# Patient Record
Sex: MALE | Race: BLACK OR AFRICAN AMERICAN | NOT HISPANIC OR LATINO | Employment: OTHER | URBAN - NONMETROPOLITAN AREA
[De-identification: names, ages, dates, MRNs, and addresses within clinical notes are randomized per-mention and may not be internally consistent; named-entity substitution may affect disease eponyms.]

---

## 2017-01-09 ENCOUNTER — HOSPITAL ENCOUNTER (OUTPATIENT)
Dept: URGENT CARE | Facility: CLINIC | Age: 37
Discharge: HOME | End: 2017-01-09
Attending: FAMILY MEDICINE | Admitting: FAMILY MEDICINE

## 2017-01-11 ENCOUNTER — OFFICE VISIT (OUTPATIENT)
Dept: FAMILY MEDICINE CLINIC | Facility: CLINIC | Age: 37
End: 2017-01-11

## 2017-01-11 VITALS
SYSTOLIC BLOOD PRESSURE: 130 MMHG | HEIGHT: 70 IN | DIASTOLIC BLOOD PRESSURE: 80 MMHG | WEIGHT: 198.4 LBS | BODY MASS INDEX: 28.4 KG/M2

## 2017-01-11 DIAGNOSIS — IMO0001 ELEVATED BLOOD PRESSURE: ICD-10-CM

## 2017-01-11 DIAGNOSIS — F17.210 CIGARETTE SMOKER: Primary | ICD-10-CM

## 2017-01-11 DIAGNOSIS — G47.33 OSA (OBSTRUCTIVE SLEEP APNEA): ICD-10-CM

## 2017-01-11 DIAGNOSIS — Z23 NEED FOR VACCINATION: ICD-10-CM

## 2017-01-11 DIAGNOSIS — D86.3 CUTANEOUS SARCOIDOSIS: ICD-10-CM

## 2017-01-11 LAB
ALBUMIN SERPL-MCNC: 3.9 GM/DL (ref 3.4–4.8)
ALP SERPL-CCNC: 89 U/L (ref 38–126)
ALT SERPL-CCNC: 61 U/L (ref 21–72)
ANION GAP SERPL CALCULATED.3IONS-SCNC: 10 MMOL/L (ref 5–15)
AST SERPL-CCNC: 48 U/L (ref 17–59)
BILIRUB SERPL-MCNC: 0.9 MG/DL (ref 0.2–1.3)
BUN SERPL-MCNC: 16 MG/DL (ref 7–21)
CALCIUM SERPL-MCNC: 8.9 MG/DL (ref 8.4–10.2)
CHLORIDE SERPL-SCNC: 100 MMOL/L (ref 95–110)
CO2 SERPL-SCNC: 31 MMOL/L (ref 22–31)
CREAT SERPL-MCNC: 1.2 MG/DL (ref 0.7–1.3)
GLUCOSE SERPL-MCNC: 78 MG/DL (ref 60–100)
LDLC SERPL DIRECT ASSAY-MCNC: 98 MG/DL (ref 0–129)
MAGNESIUM SERPL-MCNC: 1.84 MG/DL (ref 1.6–2.3)
POTASSIUM SERPL-SCNC: 3.8 MMOL/L (ref 3.5–5.1)
PROT SERPL-MCNC: 7.5 GM/DL (ref 6.3–8.6)
SODIUM SERPL-SCNC: 141 MMOL/L (ref 137–145)

## 2017-01-11 PROCEDURE — 90471 IMMUNIZATION ADMIN: CPT | Performed by: FAMILY MEDICINE

## 2017-01-11 PROCEDURE — 90658 IIV3 VACCINE SPLT 0.5 ML IM: CPT | Performed by: FAMILY MEDICINE

## 2017-01-11 PROCEDURE — 99214 OFFICE O/P EST MOD 30 MIN: CPT | Performed by: FAMILY MEDICINE

## 2017-01-11 RX ORDER — CEFPROZIL 500 MG/1
500 TABLET, FILM COATED ORAL 2 TIMES DAILY
COMMUNITY
End: 2017-04-20

## 2017-01-11 RX ORDER — FLUTICASONE PROPIONATE 50 MCG
2 SPRAY, SUSPENSION (ML) NASAL DAILY
COMMUNITY
End: 2017-04-20

## 2017-01-11 NOTE — PROGRESS NOTES
Subjective   Nitin Jackson is a 36 y.o. male.     History of Present Illness requesting evaluation of hypertension.  States his had some elevated blood pressures off and on in the past.  Luigi Devine for 5 days ago with ear pain and blood pressures elevated that time seen in the care center few days ago found have an otitis media blood pressure 142/98.  Has a family history of hypertension but has never taken medication himself.  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  Does smoke.  Also uses a CPAP machine for sleep apnea.    The following portions of the patient's history were reviewed and updated as appropriate: allergies, current medications, past family history, past medical history, past social history, past surgical history and problem list.    Review of Systems   Constitutional: Negative for activity change, appetite change, fatigue and unexpected weight change.   HENT: Positive for ear pain and postnasal drip. Negative for trouble swallowing and voice change.    Eyes: Negative for redness and visual disturbance.   Respiratory: Negative for cough and wheezing.    Cardiovascular: Negative for chest pain and palpitations.   Gastrointestinal: Negative for abdominal pain, constipation, diarrhea, nausea and vomiting.   Genitourinary: Negative for urgency.   Musculoskeletal: Negative for joint swelling.   Neurological: Negative for syncope and headaches.   Hematological: Negative for adenopathy.   Psychiatric/Behavioral: Negative for sleep disturbance.       Objective   Physical Exam   Constitutional: He is oriented to person, place, and time. He appears well-developed.   HENT:   Head: Normocephalic.   Right Ear: External ear and ear canal normal. Tympanic membrane is erythematous and retracted.   Left Ear: Ear canal normal. Tympanic membrane is retracted.   Nose: Nose normal.   Mouth/Throat: Oropharynx is clear and moist.   Eyes: Pupils are equal, round, and reactive to light.    Neck: Normal range of motion. No thyromegaly present.   Cardiovascular: Normal rate, regular rhythm, normal heart sounds and intact distal pulses.  Exam reveals no gallop and no friction rub.    No murmur heard.  Pulmonary/Chest: Breath sounds normal.   Abdominal: Soft. He exhibits no distension and no mass. There is no tenderness.   Musculoskeletal: Normal range of motion.   Neurological: He is alert and oriented to person, place, and time. He has normal reflexes.   Skin: Skin is warm and dry.   Right forearm shows a small sclerotic whitish area consistent with  sarcoid no other lesions are seen.   Psychiatric: He has a normal mood and affect. His behavior is normal. Judgment and thought content normal. Cognition and memory are normal.       Assessment/Plan   Problems Addressed this Visit        Respiratory    SHE (obstructive sleep apnea)       Musculoskeletal and Integument    Cutaneous sarcoidosis    Relevant Orders    Comprehensive Metabolic Panel    MicroAlbumin, Urine, Random       Other    Cigarette smoker - Primary      Other Visit Diagnoses     Need for vaccination        Relevant Orders    Flu Vaccine Greater Than or Equal To 2yo With Preservative IM (Completed)    Elevated blood pressure        Relevant Orders    Comprehensive Metabolic Panel    Magnesium    MicroAlbumin, Urine, Random    LDL Cholesterol, Direct         on stopping smoking.  3-10m     Probably does have a genetic tendency towards hypertension.  Sarcoidosis confounded factors.  He is to finish up medications for his otitis counseled treatment of same.  Screening lab work done.  Has had abnormal thyroid test within the last year or so.   getting his CPAP machine readjusted.  Recheck again in 7-10 days after studies require a medication or not.

## 2017-01-11 NOTE — MR AVS SNAPSHOT
Nitin Jackson   1/11/2017 10:45 AM   Office Visit    Dept Phone:  [phone]   Encounter #:  [id number]    Provider:  Wild Boateng MD   Department:  [facility] FAMILY MEDICINE                Your Full Care Plan              Today's Medication Changes          These changes are accurate as of: 1/11/17 11:04 AM.  If you have any questions, ask your nurse or doctor.               Stop taking medication(s)listed here:     naproxen 500 MG tablet   Commonly known as:  NAPROSYN   Stopped by:  Wild Boateng MD                      Your Updated Medication List          This list is accurate as of: 1/11/17 11:04 AM.  Always use your most recent med list.                cefprozil 500 MG tablet   Commonly known as:  CEFZIL       fluticasone 50 MCG/ACT nasal spray   Commonly known as:  FLONASE               We Performed the Following     Comprehensive Metabolic Panel     Flu Vaccine Greater Than or Equal To 2yo With Preservative IM     LDL Cholesterol, Direct     Magnesium     MicroAlbumin, Urine, Random       You Were Diagnosed With        Codes Comments    Cigarette smoker    -  Primary ICD-10-CM: F17.210  ICD-9-CM: 305.1     Need for vaccination     ICD-10-CM: Z23  ICD-9-CM: V05.9     Elevated blood pressure     ICD-10-CM: I10  ICD-9-CM: 401.9     Cutaneous sarcoidosis     ICD-10-CM: D86.3  ICD-9-CM: 135     SHE (obstructive sleep apnea)     ICD-10-CM: G47.33  ICD-9-CM: 327.23       Instructions     None    Patient Instructions History      Upcoming Appointments     Visit Type Date Time Department    NEW PATIENT 1/11/2017 10:45 AM [facility] MED MAD 5TH    OFFICE VISIT 1/18/2017  9:30 AM [facility] FAM MED MAD 5TH      [facility] Sign[facility]     [facility] [employer] allows you to send messages to your doctor, view your test results, renew your prescriptions, schedule appointments, and more. To sign up, go to [employer] and click on the Sign Up Now link in the New User? box.  "Enter your [employer] Activation Code exactly as it appears below along with the last four digits of your Social Security Number and your Date of Birth () to complete the sign-up process. If you do not sign up before the expiration date, you must request a new code.    [employer] Activation Code: [email or web address]  Expires: 2017 11:04 AM    If you have questions, you can email [email or web address] or call [phone] to talk to our [employer] staff. Remember, [employer] is NOT to be used for urgent needs. For medical emergencies, dial 911.               Other Info from Your Visit           Your Appointments     2017  9:30 AM CST   Office Visit with Wild Boateng MD   [facility] FAMILY MEDICINE (--)    [address] [place] [facility] [address] [phone]           Arrive 15 minutes prior to appointment.              Allergies     No Known Allergies      Reason for Visit     Hypertension emilia from cc      Vital Signs     Blood Pressure Height Weight Body Mass Index Smoking Status       130/80 70\" (177.8 cm) 198 lb 6.4 oz (90 kg) 28.47 kg/m2 Current Every Day Smoker       Problems and Diagnoses Noted     Cigarette smoker    Cutaneous sarcoidosis    SHE (obstructive sleep apnea)    Need for vaccination        Elevated blood pressure          Immunizations Administered     Name Date    Influenza TIV (IM)         "

## 2017-01-18 ENCOUNTER — OFFICE VISIT (OUTPATIENT)
Dept: FAMILY MEDICINE CLINIC | Facility: CLINIC | Age: 37
End: 2017-01-18

## 2017-01-18 VITALS
BODY MASS INDEX: 27.63 KG/M2 | DIASTOLIC BLOOD PRESSURE: 80 MMHG | HEIGHT: 70 IN | SYSTOLIC BLOOD PRESSURE: 120 MMHG | WEIGHT: 193 LBS | TEMPERATURE: 96.8 F

## 2017-01-18 DIAGNOSIS — IMO0001 ELEVATED BLOOD PRESSURE: Primary | ICD-10-CM

## 2017-01-18 DIAGNOSIS — J06.9 ACUTE URI: ICD-10-CM

## 2017-01-18 DIAGNOSIS — J01.00 ACUTE NON-RECURRENT MAXILLARY SINUSITIS: ICD-10-CM

## 2017-01-18 DIAGNOSIS — F17.210 CIGARETTE SMOKER: ICD-10-CM

## 2017-01-18 PROCEDURE — 99214 OFFICE O/P EST MOD 30 MIN: CPT | Performed by: FAMILY MEDICINE

## 2017-01-18 RX ORDER — SOD CHLOR,SOD BICARB/NETI POT
PACKET, WITH RINSE DEVICE NASAL
Qty: 30 EACH | Refills: 11 | Status: SHIPPED | OUTPATIENT
Start: 2017-01-18 | End: 2017-04-20

## 2017-01-18 RX ORDER — PREDNISONE 20 MG/1
TABLET ORAL
Qty: 10 TABLET | Refills: 0 | Status: SHIPPED | OUTPATIENT
Start: 2017-01-18 | End: 2017-04-20

## 2017-01-18 RX ORDER — DOXYCYCLINE HYCLATE 100 MG/1
CAPSULE ORAL
Qty: 20 CAPSULE | Refills: 0 | Status: SHIPPED | OUTPATIENT
Start: 2017-01-18 | End: 2017-04-20

## 2017-01-18 NOTE — MR AVS SNAPSHOT
Nitin Jackson   1/18/2017 9:30 AM   Office Visit    Dept Phone:  [phone]   Encounter #:  [id number]    Provider:  Wild Boateng MD   Department:  [facility] FAMILY MEDICINE                Your Full Care Plan              Today's Medication Changes          These changes are accurate as of: 1/18/17  9:49 AM.  If you have any questions, ask your nurse or doctor.               New Medication(s)Ordered:     doxycycline 100 MG capsule   Commonly known as:  VIBRAMYCIN   1bic z10d   Started by:  Wild Boateng MD       NASAFLO NETI POT NASAL WASH pack   Bid prn   Started by:  Wild Boateng MD       predniSONE 20 MG tablet   Commonly known as:  DELTASONE   2qdx5   Started by:  Wild Boateng MD            Where to Get Your Medications      These medications were sent to [facility] [address] [place] [address] AT [facility] [phone] [facility] [phone]   [address] [address]     Phone:  [phone]     doxycycline 100 MG capsule    NASAFLO NETI POT NASAL WASH pack    predniSONE 20 MG tablet                  Your Updated Medication List          This list is accurate as of: 1/18/17  9:49 AM.  Always use your most recent med list.                cefprozil 500 MG tablet   Commonly known as:  CEFZIL       doxycycline 100 MG capsule   Commonly known as:  VIBRAMYCIN   1bic z10d       fluticasone 50 MCG/ACT nasal spray   Commonly known as:  FLONASE       NASAFLO NETI POT NASAL WASH pack   Bid prn       predniSONE 20 MG tablet   Commonly known as:  DELTASONE   2qdx5               You Were Diagnosed With        Codes Comments    Elevated blood pressure    -  Primary ICD-10-CM: I10  ICD-9-CM: 401.9     Acute URI     ICD-10-CM: J06.9  ICD-9-CM: 465.9     Cigarette smoker     ICD-10-CM: F17.210  ICD-9-CM: 305.1     Acute non-recurrent maxillary sinusitis     ICD-10-CM: J01.00  ICD-9-CM: 461.0       Instructions     None    "Patient Instructions History      Upcoming Appointments     Visit Type Date Time Department    [facility]    RE-EVALUATION 2017  9:15 AM [facility] MED [facility]AD 5[facility]      MyChart Signup     [facility] [employer] allows you to send messages to your doctor, view your test results, renew your prescriptions, schedule appointments, and more. To sign up, go to [employer] and click on the Sign Up Now link in the New User? box. Enter your [employer] Activation Code exactly as it appears below along with the last four digits of your Social Security Number and your Date of Birth () to complete the sign-up process. If you do not sign up before the expiration date, you must request a new code.    [employer] Activation Code: [email or web address]  Expires: 2017 11:04 AM    If you have questions, you can email [email or web address] or call [phone] to talk to our [email or web address] staff. Remember, [employer] is NOT to be used for urgent needs. For medical emergencies, dial 911.               Other Info from Your Visit           Your Appointments     May 18, 2017  9:15 AM CDT   REEVALUATION with Wild Boateng MD   [facility] (--)    [address] [place] [facility] [address] [phone]              Allergies     No Known Allergies      Reason for Visit     Follow-up 7-10 day reval      Vital Signs     Blood Pressure Temperature Height Weight Body Mass Index Smoking Status    120/80 96.8 °F (36 °C) 70\" (177.8 cm) 193 lb (87.5 kg) 27.69 kg/m2 Current Every Day Smoker      Problems and Diagnoses Noted     Cigarette smoker    Elevated blood pressure    Acute upper respiratory infection        Acute non-recurrent maxillary sinusitis            "

## 2017-01-18 NOTE — PROGRESS NOTES
Subjective   Nitin Jackson is a 36 y.o. male.     History of Present Illness reevaluation of borderline hypertension tobacco abuse.  Lab work was within normal limits.  Blood pressures now down.  Has lost about 5 pounds.  Has developed intercurrent URI with sinus pressure drainage cough congestion for over a week.    The following portions of the patient's history were reviewed and updated as appropriate: allergies, current medications, past family history, past medical history, past social history, past surgical history and problem list.    Review of Systems   Constitutional: Negative for activity change, appetite change, fatigue and unexpected weight change.   HENT: Positive for congestion, rhinorrhea and sinus pressure. Negative for trouble swallowing and voice change.    Eyes: Negative for redness and visual disturbance.   Respiratory: Positive for cough. Negative for wheezing.    Cardiovascular: Negative for chest pain and palpitations.   Gastrointestinal: Negative for abdominal pain, constipation, diarrhea, nausea and vomiting.   Genitourinary: Negative for urgency.   Musculoskeletal: Negative for joint swelling.   Neurological: Negative for syncope and headaches.   Hematological: Negative for adenopathy.   Psychiatric/Behavioral: Negative for sleep disturbance.       Objective   Physical Exam   Constitutional: He is oriented to person, place, and time. He appears well-developed.   HENT:   Head: Normocephalic.   Right Ear: External ear normal.   Left Ear: External ear normal.   Nose: Mucosal edema and rhinorrhea present. Right sinus exhibits maxillary sinus tenderness. Left sinus exhibits maxillary sinus tenderness.   Mouth/Throat: Posterior oropharyngeal edema and posterior oropharyngeal erythema present.   Eyes: Pupils are equal, round, and reactive to light.   Neck: Normal range of motion. No thyromegaly present.   Cardiovascular: Normal rate, regular rhythm, normal heart sounds and intact distal  pulses.  Exam reveals no gallop and no friction rub.    No murmur heard.  Pulmonary/Chest: He has rhonchi (scant bases) in the right lower field and the left lower field.   Abdominal: Soft. He exhibits no distension and no mass. There is no tenderness.   Musculoskeletal: Normal range of motion.   Neurological: He is alert and oriented to person, place, and time. He has normal reflexes.   Skin: Skin is warm and dry.   Psychiatric: He has a normal mood and affect.       Assessment/Plan   Problems Addressed this Visit        Cardiovascular and Mediastinum    Elevated blood pressure - Primary       Other    Cigarette smoker      Other Visit Diagnoses     Acute URI        Relevant Medications    doxycycline (VIBRAMYCIN) 100 MG capsule    Acute non-recurrent maxillary sinusitis        Relevant Medications    Hypertonic Nasal Wash (NASAFLO NETI POT NASAL WASH) pack    predniSONE (DELTASONE) 20 MG tablet    doxycycline (VIBRAMYCIN) 100 MG capsule        Romina pot's fluids Clain air handwashing symptomatic measures.   lifestyle measures recheck blood pressure 3 months

## 2017-04-20 ENCOUNTER — TRANSCRIBE ORDERS (OUTPATIENT)
Dept: GENERAL RADIOLOGY | Facility: CLINIC | Age: 37
End: 2017-04-20

## 2017-04-20 ENCOUNTER — TRANSCRIBE ORDERS (OUTPATIENT)
Dept: LAB | Facility: HOSPITAL | Age: 37
End: 2017-04-20

## 2017-04-20 ENCOUNTER — LAB (OUTPATIENT)
Dept: LAB | Facility: HOSPITAL | Age: 37
End: 2017-04-20

## 2017-04-20 ENCOUNTER — OFFICE VISIT (OUTPATIENT)
Dept: FAMILY MEDICINE CLINIC | Facility: CLINIC | Age: 37
End: 2017-04-20

## 2017-04-20 VITALS
DIASTOLIC BLOOD PRESSURE: 98 MMHG | HEIGHT: 70 IN | WEIGHT: 197.9 LBS | BODY MASS INDEX: 28.33 KG/M2 | SYSTOLIC BLOOD PRESSURE: 148 MMHG | TEMPERATURE: 98 F

## 2017-04-20 DIAGNOSIS — F17.210 CIGARETTE SMOKER: ICD-10-CM

## 2017-04-20 DIAGNOSIS — Z79.899 LONG-TERM USE OF HIGH-RISK MEDICATION: ICD-10-CM

## 2017-04-20 DIAGNOSIS — Z72.0 TOBACCO USE: ICD-10-CM

## 2017-04-20 DIAGNOSIS — I10 ESSENTIAL HYPERTENSION, BENIGN: ICD-10-CM

## 2017-04-20 DIAGNOSIS — Z79.899 LONG-TERM USE OF HIGH-RISK MEDICATION: Primary | ICD-10-CM

## 2017-04-20 DIAGNOSIS — D86.3 CUTANEOUS SARCOIDOSIS: ICD-10-CM

## 2017-04-20 DIAGNOSIS — D86.9 SARCOIDOSIS: Primary | ICD-10-CM

## 2017-04-20 DIAGNOSIS — J40 BRONCHITIS: ICD-10-CM

## 2017-04-20 DIAGNOSIS — J06.9 ACUTE URI: Primary | ICD-10-CM

## 2017-04-20 DIAGNOSIS — Z23 NEED FOR PNEUMOCOCCAL VACCINE: ICD-10-CM

## 2017-04-20 DIAGNOSIS — R05.9 COUGH: ICD-10-CM

## 2017-04-20 PROBLEM — IMO0001 ELEVATED BLOOD PRESSURE: Status: RESOLVED | Noted: 2017-01-18 | Resolved: 2017-04-20

## 2017-04-20 LAB
ALBUMIN SERPL-MCNC: 4.5 G/DL (ref 3.4–4.8)
ALP SERPL-CCNC: 91 U/L (ref 38–126)
ALT SERPL W P-5'-P-CCNC: 30 U/L (ref 21–72)
AST SERPL-CCNC: 43 U/L (ref 17–59)
BASOPHILS # BLD AUTO: 0.01 10*3/MM3 (ref 0–0.2)
BASOPHILS NFR BLD AUTO: 0.1 % (ref 0–2)
BILIRUB CONJ SERPL-MCNC: 0 MG/DL (ref 0–0.3)
BILIRUB INDIRECT SERPL-MCNC: 0.2 MG/DL (ref 0–1.1)
BILIRUB SERPL-MCNC: 0.6 MG/DL (ref 0.2–1.3)
DEPRECATED RDW RBC AUTO: 42.7 FL (ref 35.1–43.9)
EOSINOPHIL # BLD AUTO: 0.11 10*3/MM3 (ref 0–0.7)
EOSINOPHIL NFR BLD AUTO: 0.7 % (ref 0–7)
ERYTHROCYTE [DISTWIDTH] IN BLOOD BY AUTOMATED COUNT: 13.2 % (ref 11.5–14.5)
ERYTHROCYTE [SEDIMENTATION RATE] IN BLOOD: 4 MM/HR (ref 0–15)
HCT VFR BLD AUTO: 44.7 % (ref 39–49)
HGB BLD-MCNC: 15.7 G/DL (ref 13.7–17.3)
IMM GRANULOCYTES # BLD: 0.06 10*3/MM3 (ref 0–0.02)
IMM GRANULOCYTES NFR BLD: 0.4 % (ref 0–0.5)
LYMPHOCYTES # BLD AUTO: 2.67 10*3/MM3 (ref 0.6–4.2)
LYMPHOCYTES NFR BLD AUTO: 18.2 % (ref 10–50)
MCH RBC QN AUTO: 31.2 PG (ref 26.5–34)
MCHC RBC AUTO-ENTMCNC: 35.1 G/DL (ref 31.5–36.3)
MCV RBC AUTO: 88.7 FL (ref 80–98)
MONOCYTES # BLD AUTO: 1.89 10*3/MM3 (ref 0–0.9)
MONOCYTES NFR BLD AUTO: 12.8 % (ref 0–12)
NEUTROPHILS # BLD AUTO: 9.97 10*3/MM3 (ref 2–8.6)
NEUTROPHILS NFR BLD AUTO: 67.8 % (ref 37–80)
PLATELET # BLD AUTO: 196 10*3/MM3 (ref 150–450)
PMV BLD AUTO: 12.1 FL (ref 8–12)
PROT SERPL-MCNC: 8 G/DL (ref 6.3–8.6)
RBC # BLD AUTO: 5.04 10*6/MM3 (ref 4.37–5.74)
WBC NRBC COR # BLD: 14.71 10*3/MM3 (ref 3.2–9.8)

## 2017-04-20 PROCEDURE — 80076 HEPATIC FUNCTION PANEL: CPT

## 2017-04-20 PROCEDURE — 85025 COMPLETE CBC W/AUTO DIFF WBC: CPT

## 2017-04-20 PROCEDURE — 82164 ANGIOTENSIN I ENZYME TEST: CPT

## 2017-04-20 PROCEDURE — 85651 RBC SED RATE NONAUTOMATED: CPT

## 2017-04-20 PROCEDURE — 99214 OFFICE O/P EST MOD 30 MIN: CPT | Performed by: FAMILY MEDICINE

## 2017-04-20 PROCEDURE — 86038 ANTINUCLEAR ANTIBODIES: CPT

## 2017-04-20 PROCEDURE — 36415 COLL VENOUS BLD VENIPUNCTURE: CPT

## 2017-04-20 RX ORDER — CHLORTHALIDONE 25 MG/1
25 TABLET ORAL DAILY
Qty: 30 TABLET | Refills: 1 | Status: ON HOLD | OUTPATIENT
Start: 2017-04-20 | End: 2017-08-22

## 2017-04-20 RX ORDER — BENZONATATE 200 MG/1
200 CAPSULE ORAL 3 TIMES DAILY PRN
Qty: 30 CAPSULE | Refills: 0 | Status: SHIPPED | OUTPATIENT
Start: 2017-04-20 | End: 2017-04-26

## 2017-04-20 RX ORDER — DOXYCYCLINE HYCLATE 100 MG/1
CAPSULE ORAL
Qty: 14 CAPSULE | Refills: 0 | Status: SHIPPED | OUTPATIENT
Start: 2017-04-20 | End: 2017-04-25

## 2017-04-20 RX ORDER — ALBUTEROL SULFATE 90 UG/1
2 AEROSOL, METERED RESPIRATORY (INHALATION) EVERY 4 HOURS PRN
Qty: 1 INHALER | Refills: 2 | Status: SHIPPED | OUTPATIENT
Start: 2017-04-20 | End: 2019-06-03 | Stop reason: SDUPTHER

## 2017-04-20 NOTE — PROGRESS NOTES
Subjective   Nitin Jackson is a 36 y.o. male.     History of Present Illness requesting evaluation cough congestion coryza wheeze blood pressure.  Continues to smoke.  Develop cough and congestion last 1-3 days.  No fever.  Has being seen by dermatology being worked up for sarcoid.  Currently is getting lab work and chest x-ray ordered today.  Is also to start plaquinel  and oral steroids today as well.  Blood pressure  creeping up over the last several weeks and has a history of borderline blood pressure in the past.     The following portions of the patient's history were reviewed and updated as appropriate: allergies, current medications, past family history, past medical history, past social history, past surgical history and problem list.    Review of Systems   Constitutional: Negative for activity change, appetite change, fatigue and unexpected weight change.   HENT: Positive for congestion. Negative for trouble swallowing and voice change.    Eyes: Negative for redness and visual disturbance.   Respiratory: Positive for cough. Negative for wheezing.    Cardiovascular: Negative for chest pain and palpitations.   Gastrointestinal: Negative for abdominal pain, constipation, diarrhea, nausea and vomiting.   Genitourinary: Negative for urgency.   Musculoskeletal: Negative for joint swelling.   Neurological: Negative for syncope and headaches.   Hematological: Negative for adenopathy.   Psychiatric/Behavioral: Negative for sleep disturbance.       Objective   Physical Exam   Constitutional: He is oriented to person, place, and time. He appears well-developed.   HENT:   Head: Normocephalic.   Right Ear: External ear normal.   Left Ear: External ear normal.   Nose: Mucosal edema present.   Mouth/Throat: Oropharynx is clear and moist.   Eyes: Pupils are equal, round, and reactive to light.   Neck: Normal range of motion. No thyromegaly present.   Cardiovascular: Normal rate, regular rhythm, normal heart sounds  and intact distal pulses.  Exam reveals no gallop and no friction rub.    No murmur heard.  Pulmonary/Chest: He has rhonchi in the right lower field and the left lower field.   Normal rate   Abdominal: Soft. He exhibits no distension and no mass. There is no tenderness.   Musculoskeletal: Normal range of motion.   Neurological: He is alert and oriented to person, place, and time. He has normal reflexes.   Skin: Skin is warm and dry.   Forearm lesions as before   Psychiatric: He has a normal mood and affect.       Assessment/Plan   Problems Addressed this Visit        Cardiovascular and Mediastinum    Essential hypertension, benign    Relevant Medications    chlorthalidone (HYGROTON) 25 MG tablet       Musculoskeletal and Integument    Cutaneous sarcoidosis       Other    Cigarette smoker    Relevant Medications    doxycycline (VIBRAMYCIN) 100 MG capsule      Other Visit Diagnoses     Acute URI    -  Primary    Relevant Medications    albuterol (PROVENTIL HFA;VENTOLIN HFA) 108 (90 BASE) MCG/ACT inhaler    doxycycline (VIBRAMYCIN) 100 MG capsule    Bronchitis        Relevant Medications    albuterol (PROVENTIL HFA;VENTOLIN HFA) 108 (90 BASE) MCG/ACT inhaler    benzonatate (TESSALON) 200 MG capsule    Cough        Relevant Medications    albuterol (PROVENTIL HFA;VENTOLIN HFA) 108 (90 BASE) MCG/ACT inhaler    benzonatate (TESSALON) 200 MG capsule    doxycycline (VIBRAMYCIN) 100 MG capsule          Measures for bronchitis and wheezing.  Counseled again on stopping smoking is previous appointment.  Good*chlorthalidone for blood pressure since is going to be on steroids as well.   lifestyle measures as before.  Recheck 4 weeks.

## 2017-04-21 LAB
ACE SERPL-CCNC: 73 U/L (ref 14–82)
ANA SER QL IA: NEGATIVE

## 2017-04-25 ENCOUNTER — OFFICE VISIT (OUTPATIENT)
Dept: FAMILY MEDICINE CLINIC | Facility: CLINIC | Age: 37
End: 2017-04-25

## 2017-04-25 VITALS
BODY MASS INDEX: 28.07 KG/M2 | HEIGHT: 70 IN | TEMPERATURE: 98.7 F | OXYGEN SATURATION: 98 % | WEIGHT: 196.1 LBS | DIASTOLIC BLOOD PRESSURE: 94 MMHG | SYSTOLIC BLOOD PRESSURE: 158 MMHG | HEART RATE: 85 BPM

## 2017-04-25 DIAGNOSIS — I10 ESSENTIAL HYPERTENSION, BENIGN: Primary | ICD-10-CM

## 2017-04-25 DIAGNOSIS — R93.89 ABNORMAL CHEST X-RAY: ICD-10-CM

## 2017-04-25 PROCEDURE — 99214 OFFICE O/P EST MOD 30 MIN: CPT | Performed by: FAMILY MEDICINE

## 2017-04-25 RX ORDER — AMLODIPINE BESYLATE 5 MG/1
5 TABLET ORAL DAILY
Qty: 90 TABLET | Refills: 3 | Status: ON HOLD | OUTPATIENT
Start: 2017-04-25 | End: 2017-08-22

## 2017-04-25 NOTE — PROGRESS NOTES
Subjective   Nitin Jackson is a 36 y.o. male.     History of Present Illness reevaluation of blood pressure and lung issues.  In the interim blood pressures begin to creep upward even on chlorthalidone.  Lab work drawn by dermatology revealed a normal screen for sarcoid.  However chest x-ray showed a sequential enlarging nodule right upper lobe of the lung.  Given his history needs a pulmonary evaluation of same.  Smoking in is having still some residual congestion but overall improved.    The following portions of the patient's history were reviewed and updated as appropriate: allergies, current medications, past family history, past medical history, past social history, past surgical history and problem list.    Review of Systems   Constitutional: Negative for activity change, appetite change, fatigue and unexpected weight change.   HENT: Positive for congestion. Negative for trouble swallowing and voice change.    Eyes: Negative for redness and visual disturbance.   Respiratory: Positive for cough. Negative for wheezing.    Cardiovascular: Negative for chest pain and palpitations.   Gastrointestinal: Negative for abdominal pain, constipation, diarrhea, nausea and vomiting.   Genitourinary: Negative for urgency.   Musculoskeletal: Negative for joint swelling.   Neurological: Negative for syncope and headaches.   Hematological: Negative for adenopathy.   Psychiatric/Behavioral: Negative for sleep disturbance.       Objective   Physical Exam   HENT:   Head: Normocephalic.   Eyes: Pupils are equal, round, and reactive to light.   Neck: Normal range of motion.   Cardiovascular: Normal rate, regular rhythm and normal heart sounds.    Pulmonary/Chest: Effort normal and breath sounds normal. He has no wheezes.   Abdominal: Soft. Bowel sounds are normal.   Musculoskeletal: Normal range of motion.   Neurological: He is alert. He has normal reflexes.   Skin: Skin is warm.   Psychiatric: He has a normal mood and affect.        Assessment/Plan   Problems Addressed this Visit        Cardiovascular and Mediastinum    Essential hypertension, benign - Primary    Relevant Medications    amLODIPine (NORVASC) 5 MG tablet      Other Visit Diagnoses     Abnormal chest x-ray        Relevant Orders    Ambulatory Referral to Pulmonology          Add Norvasc to chlorthalidone in  lifestyle measures  staying off cigarettes.  Consultation with pulmonology in regards to x-ray and questionable history of cutaneous sarcoid.  Recheck on blood pressure 3 months

## 2017-04-26 ENCOUNTER — OFFICE VISIT (OUTPATIENT)
Dept: PULMONOLOGY | Facility: CLINIC | Age: 37
End: 2017-04-26

## 2017-04-26 VITALS
DIASTOLIC BLOOD PRESSURE: 88 MMHG | SYSTOLIC BLOOD PRESSURE: 150 MMHG | OXYGEN SATURATION: 98 % | HEIGHT: 70 IN | BODY MASS INDEX: 28.06 KG/M2 | HEART RATE: 82 BPM | WEIGHT: 196 LBS

## 2017-04-26 DIAGNOSIS — R93.89 ABNORMAL CXR: Primary | ICD-10-CM

## 2017-04-26 DIAGNOSIS — G47.33 OSA (OBSTRUCTIVE SLEEP APNEA): ICD-10-CM

## 2017-04-26 DIAGNOSIS — J30.9 ALLERGIC RHINITIS, UNSPECIFIED ALLERGIC RHINITIS TRIGGER, UNSPECIFIED RHINITIS SEASONALITY: ICD-10-CM

## 2017-04-26 DIAGNOSIS — D86.3 CUTANEOUS SARCOIDOSIS: ICD-10-CM

## 2017-04-26 DIAGNOSIS — R05.9 COUGH: ICD-10-CM

## 2017-04-26 DIAGNOSIS — F17.210 CIGARETTE SMOKER: ICD-10-CM

## 2017-04-26 PROCEDURE — 99214 OFFICE O/P EST MOD 30 MIN: CPT | Performed by: INTERNAL MEDICINE

## 2017-04-26 PROCEDURE — 94729 DIFFUSING CAPACITY: CPT | Performed by: INTERNAL MEDICINE

## 2017-04-26 PROCEDURE — 94060 EVALUATION OF WHEEZING: CPT | Performed by: INTERNAL MEDICINE

## 2017-04-26 PROCEDURE — 94727 GAS DIL/WSHOT DETER LNG VOL: CPT | Performed by: INTERNAL MEDICINE

## 2017-04-26 RX ORDER — FLUTICASONE PROPIONATE 50 MCG
2 SPRAY, SUSPENSION (ML) NASAL DAILY
Qty: 1 EACH | Refills: 11
Start: 2017-04-26 | End: 2021-03-19 | Stop reason: SDUPTHER

## 2017-04-26 NOTE — PROGRESS NOTES
Pulmonary Consultation    Subjective     Chief Complaint   Patient presents with   • Abnormal Chest X-ray        History of Present Illness  Nitin Jackson is a 36 y.o. male with a PMH significant for tobacco use, cutaneous sarcoid, SHE CPAP, and hypertension who presents for evaluation of an abnormal chest x-ray.  Patient was previously seen in consultation by Dr. Gandhi in April of last year for abnormal CT and underwent bronchoscopy which was only significant for findings suspicious for asthma. Pt states he has had issues with possible sarcoidosis for several years. He has noticed 4-5yrs of KYLE and fatigue. Pt admits to chronic nasal congestion and drainage, but he had never been diagnosed with allergies. He admits to wheeze and cough productive of yellowish sputum. Pt previously smoked 1ppd from 16 until last week. He has not noticed much difference since stopping. Pt was just prescribed albuterol but he has not been able to use it yet. He underwent a skin biopsy last week due to persistent rash but he is awaiting results. There is no lung disease in his family. He currently works as a alvarez.     Review of Systems: History obtained from chart review and the patient.  Review of Systems   HENT: Positive for congestion and postnasal drip.    Respiratory: Positive for cough, shortness of breath and wheezing.    Gastrointestinal:        Heartburn   Skin: Positive for rash.     As described in the HPI. Otherwise, remainder of ROS (14 systems) were negative.    Patient Active Problem List   Diagnosis   • Cigarette smoker   • Cutaneous sarcoidosis   • SHE (obstructive sleep apnea)   • Essential hypertension, benign   • Allergic rhinitis         Current Outpatient Prescriptions:   •  albuterol (PROVENTIL HFA;VENTOLIN HFA) 108 (90 BASE) MCG/ACT inhaler, Inhale 2 puffs Every 4 (Four) Hours As Needed for Wheezing., Disp: 1 inhaler, Rfl: 2  •  amLODIPine (NORVASC) 5 MG tablet, Take 1 tablet by mouth Daily. For bp along  "with other, Disp: 90 tablet, Rfl: 3  •  chlorthalidone (HYGROTON) 25 MG tablet, Take 1 tablet by mouth Daily., Disp: 30 tablet, Rfl: 1  •  fluticasone (FLONASE) 50 MCG/ACT nasal spray, 2 sprays into each nostril Daily for 30 days., Disp: 1 each, Rfl: 11    Past Medical History:   Diagnosis Date   • Allergic rhinitis    • Chronic dermatitis    • Chronic right shoulder pain    • Cigarette smoker 8/24/2016   • Cutaneous sarcoidosis 8/24/2016   • DJD (degenerative joint disease)     shoulder region, right side   • Dyspnea on exertion    • Elevated blood pressure    • Encounter for routine adult health examination    • Fatigue    • Obstructive sleep apnea    • Obstructive sleep apnea syndrome 8/24/2016   • Pain in left knee    • Vertigo      Past Surgical History:   Procedure Laterality Date   • INJECTION OF MEDICATION  08/10/2016    Kenalog   • SHOULDER ARTHROSCOPY  01/13/2015    Arthroscopy of shoulder, subacromial decompression, Reed, and injection of the shoulder with 80 ml Kenalog     Social History     Social History   • Marital status: Single     Spouse name: N/A   • Number of children: N/A   • Years of education: N/A     Social History Main Topics   • Smoking status: Former Smoker   • Smokeless tobacco: None   • Alcohol use No   • Drug use: None   • Sexual activity: Not Asked     Other Topics Concern   • None     Social History Narrative     Family History   Problem Relation Age of Onset   • Diabetes Other    • Heart disease Other    • Hypertension Other           Objective     Blood pressure 150/88, pulse 82, height 70\" (177.8 cm), weight 196 lb (88.9 kg), SpO2 98 %.  Physical Exam   Constitutional: He is oriented to person, place, and time. Vital signs are normal. He appears well-developed and well-nourished.   HENT:   Head: Normocephalic and atraumatic.   Nose: Mucosal edema present.   Mouth/Throat: Uvula is midline, oropharynx is clear and moist and mucous membranes are normal.   Mallampati 4   Eyes: " Conjunctivae, EOM and lids are normal.   Neck: Trachea normal and normal range of motion. No tracheal tenderness present. No thyroid mass present.   Cardiovascular: Normal rate, regular rhythm and normal heart sounds.  Exam reveals no gallop.    No murmur heard.  Pulmonary/Chest: Effort normal. No respiratory distress. He has wheezes (on forced expiration). He has no rhonchi. Chest wall is not dull to percussion. He exhibits no tenderness.   Abdominal: Soft. Normal appearance and bowel sounds are normal. There is no tenderness.       Vascular Status -  His exam exhibits no right foot edema. His exam exhibits no left foot edema.  Lymphadenopathy:        Head (right side): No submandibular adenopathy present.        Head (left side): No submandibular adenopathy present.     He has no cervical adenopathy.        Right: No supraclavicular adenopathy present.        Left: No supraclavicular adenopathy present.   Neurological: He is alert and oriented to person, place, and time.   Skin: Skin is warm and dry. Rash (discoid rash right arm) noted. No cyanosis. Nails show no clubbing.   Psychiatric: He has a normal mood and affect. His speech is normal and behavior is normal. Judgment normal.   Nursing note and vitals reviewed.      PFTs: 4/26/17  Ratio 100  FVC 2.7/61%  FEV1 2.7/74%  TLC 6.26/90%  RV/%  DLCO 18.29/56%  Normal spirometry and volumes.  No significant bronchodilator response.  Air trapping.  Moderately reduced diffusing capacity.  No comparative data available.     Radiology (independently reviewed and interpreted by me): CXR 4/20/17 showed increased size of right upper lobe nodule.    CT chest with contrast 3/31/16: Left lung apical nodular opacity is irregular borders measuring 6.7 mm, left upper lobe apical posterior segments opacities measuring up to 9.6 mm, additional left upper lobe apical posterior segment nodular opacity measuring 8.6 mm, left lower lobes.  Segment patchy opacities.  A borders  measuring 3.4 x 2 x 1.1 cm, right upper lobe apical segment patchy opacity measuring 1.2 cm, additional right upper lobe posterior segment irregular patchy opacity measuring 2.3 x 1.8 x 1.4 cm with finger in glove appearance.         Assessment/Plan     Nitin was seen today for abnormal chest x-ray.    Diagnoses and all orders for this visit:    Abnormal CXR  -     CT Chest Hi Resolution; Future    Cough    Cigarette smoker    SHE (obstructive sleep apnea)    Allergic rhinitis, unspecified allergic rhinitis trigger, unspecified rhinitis seasonality  -     fluticasone (FLONASE) 50 MCG/ACT nasal spray; 2 sprays into each nostril Daily for 30 days.    Cutaneous sarcoidosis         Discussion/ Recommendations:   Finding on chest x-ray is likely correlates with patchy opacities seen on CT scan last year.  He has undergone bronchoscopy which was nondiagnostic, but carries a diagnosis of likely cutaneous sarcoidosis.  Is very possible these areas represent inflammation related to sarcoidosis, although it is unusual pattern knee has no adenopathy.  I think his congestion and shortness of breath are more likely related to chronic allergic rhinitis and possibly underlying asthma.  PFTs did not show concerning deficits that would be consistent with sarcoidosis.    -CT chest without contrast to more fully evaluate abnormality's on chest x-ray.  Requested that he return following his CT to review the results and discuss possible further workup.  -Start generic Flonase.  If symptoms persist after 2 weeks, would recommend starting generic Zyrtec daily.  -Encourage patient to get albuterol filled and use as needed.  We'll assess if he needs a daily bronchodilator at follow-up.  -Follow-up with dermatologist regarding recent skin biopsy.  -Pt needs to continue smoking cessation.       Return in about 2 weeks (around 5/10/2017) for Recheck to review CT chest.      Thank you for allowing me to participate in the care of Nitin Arnold  Manuel. Please do not hesitate to contact me with any questions.         This document has been electronically signed by Licha Molina MD on April 26, 2017 11:36 AM      EMR Dragon/Transcription disclaimer:     Much of this encounter note is an electronic transcription/translation of spoken language to printed text. The electronic translation of spoken language may permit erroneous, or at times, nonsensical words or phrases to be inadvertently transcribed; Although I have reviewed the note for such errors, some may still exist.

## 2017-05-02 ENCOUNTER — HOSPITAL ENCOUNTER (OUTPATIENT)
Dept: CT IMAGING | Facility: HOSPITAL | Age: 37
Discharge: HOME | End: 2017-05-02
Admitting: INTERNAL MEDICINE

## 2017-05-02 DIAGNOSIS — R93.89 ABNORMAL CXR: ICD-10-CM

## 2017-05-02 PROCEDURE — 71250 CT THORAX DX C-: CPT

## 2017-05-10 ENCOUNTER — OFFICE VISIT (OUTPATIENT)
Dept: PULMONOLOGY | Facility: CLINIC | Age: 37
End: 2017-05-10

## 2017-05-10 VITALS
OXYGEN SATURATION: 98 % | SYSTOLIC BLOOD PRESSURE: 129 MMHG | DIASTOLIC BLOOD PRESSURE: 80 MMHG | WEIGHT: 198 LBS | HEIGHT: 70 IN | HEART RATE: 89 BPM | BODY MASS INDEX: 28.35 KG/M2

## 2017-05-10 DIAGNOSIS — R93.89 ABNORMAL CT OF THE CHEST: Primary | ICD-10-CM

## 2017-05-10 DIAGNOSIS — D86.9 SARCOIDOSIS: ICD-10-CM

## 2017-05-10 DIAGNOSIS — J45.30 MILD PERSISTENT ASTHMA WITHOUT COMPLICATION: ICD-10-CM

## 2017-05-10 DIAGNOSIS — J30.2 SEASONAL ALLERGIC RHINITIS, UNSPECIFIED ALLERGIC RHINITIS TRIGGER: ICD-10-CM

## 2017-05-10 DIAGNOSIS — F17.200 TOBACCO USE DISORDER: ICD-10-CM

## 2017-05-10 PROCEDURE — 99214 OFFICE O/P EST MOD 30 MIN: CPT | Performed by: INTERNAL MEDICINE

## 2017-06-26 ENCOUNTER — OFFICE VISIT (OUTPATIENT)
Dept: PULMONOLOGY | Facility: CLINIC | Age: 37
End: 2017-06-26

## 2017-06-26 VITALS
HEIGHT: 70 IN | DIASTOLIC BLOOD PRESSURE: 90 MMHG | BODY MASS INDEX: 28.2 KG/M2 | HEART RATE: 77 BPM | WEIGHT: 197 LBS | OXYGEN SATURATION: 99 % | SYSTOLIC BLOOD PRESSURE: 122 MMHG

## 2017-06-26 DIAGNOSIS — D86.3 CUTANEOUS SARCOIDOSIS: ICD-10-CM

## 2017-06-26 DIAGNOSIS — F17.210 CIGARETTE SMOKER: ICD-10-CM

## 2017-06-26 DIAGNOSIS — R93.89 ABNORMAL CT OF THE CHEST: ICD-10-CM

## 2017-06-26 DIAGNOSIS — J45.30 MILD PERSISTENT ASTHMA WITHOUT COMPLICATION: Primary | ICD-10-CM

## 2017-06-26 PROCEDURE — 99214 OFFICE O/P EST MOD 30 MIN: CPT | Performed by: INTERNAL MEDICINE

## 2017-06-26 NOTE — PROGRESS NOTES
Pulmonary Office Follow-up    Subjective     Nitin Jackson is seen today at the office for   Chief Complaint   Patient presents with   • Follow-up     1 month         HPI  Nitin Jackson is a 36 y.o. male with a PMH significant for tobacco use, cutaneous sarcoid, SHE CPAP, and hypertension who presents for follow up Of asthma.  He was last seen on 5/10/17, at which time I recommended starting Qvar as well as waiting until he reactivate his insurance to proceed with lung biopsy. He does feel the Qvar is helping and he is not using his albuterol. Patient was able to get his insurance reinstated so he is ready to move forward with biopsy.  He's been able to cut down his smoking and is smoking 5 cigarettes daily.    Patient Active Problem List   Diagnosis   • Cigarette smoker   • Cutaneous sarcoidosis   • SHE (obstructive sleep apnea)   • Essential hypertension, benign   • Allergic rhinitis   • Sarcoidosis       Review of Systems  Review of Systems   Respiratory: Positive for shortness of breath.    Skin: Positive for rash.     As described in the HPI. Otherwise, remainder of ROS (14 systems) were negative.    Medications, Allergies, Social, and Family Histories reviewed as per EMR.    Objective     Vitals:    06/26/17 1129   BP: 122/90   Pulse: 77   SpO2: 99%     Physical Exam   Constitutional: He is oriented to person, place, and time. Vital signs are normal. He appears well-developed and well-nourished.   HENT:   Head: Normocephalic and atraumatic.   Nose: No mucosal edema.   Mouth/Throat: Uvula is midline, oropharynx is clear and moist and mucous membranes are normal.   Mallampati 4   Eyes: Conjunctivae, EOM and lids are normal.   Neck: Trachea normal and normal range of motion. No tracheal tenderness present. No thyroid mass present.   Cardiovascular: Normal rate, regular rhythm and normal heart sounds.  Exam reveals no gallop.    No murmur heard.  Pulmonary/Chest: Effort normal. No respiratory distress.  He has no wheezes. He has no rhonchi.   Abdominal: Soft. Normal appearance and bowel sounds are normal. There is no tenderness.       Vascular Status -  His exam exhibits no right foot edema. His exam exhibits no left foot edema.  Lymphadenopathy:        Head (right side): No submandibular adenopathy present.        Head (left side): No submandibular adenopathy present.     He has no cervical adenopathy.        Right: No supraclavicular adenopathy present.        Left: No supraclavicular adenopathy present.   Neurological: He is alert and oriented to person, place, and time.   Skin: Skin is warm and dry. Rash (annular rash right forearm and thigh) noted. No cyanosis. Nails show no clubbing.   Psychiatric: He has a normal mood and affect. His speech is normal and behavior is normal. Judgment normal.   Nursing note and vitals reviewed.      IMAGING: HRCT 5/2/17 (independently reviewed and interpreted by me) showed no lymphadenopathy, mildly enlarged left apical interstitial nodule injuring 1 cm, stable left apex interstitial nodule measuring 8 mm, minimal enlargement of the left upper lobe apical small nodular interstitial opacities largest measuring 1.3 cm, new right upper lobe apical interstitial nodule 6.6 mm, mild decrease in size of right upper lobe apical speculated nodule opacity 1.5 cm, minimal enlargement left upper lobe apical posterior interstitial nodular opacities measuring 9 mm, white blood posterior segment peripheral stable patchy opacity 2.2 cm, left upper lobes.  Segment patchy interstitial opacities speedily borders not appreciated changed measuring 2.5 x 2.2 x 1.9 cm.      Assessment/Plan     Nitin was seen today for follow-up.    Diagnoses and all orders for this visit:    Mild persistent asthma without complication    Abnormal CT of the chest  -     Ambulatory Referral to Pulmonology    Cutaneous sarcoidosis    Cigarette smoker         Discussion/ Recommendations:   He is doing better  symptomatically on the Qvar so I think we'll stay the course.  Discussed options for biopsy including navigation bronchoscopy versus CT-guided biopsy.  I do think navigation bronchoscopy would offer is more options with regards to sampling both sides as well as lower risk for pneumothorax.  I do think his infiltrates are most consistent with sarcoidosis, but I cannot rule out other etiologies without further workup.    -Continue Qvar 40.   -Continue using albuterol as needed.  -Referral to [facility] for consideration of navigation bronchoscopy.  Instructed him to obtain his HRCT on a disc to take with him to his appointment in [place].  -Again, stressed the importance of continued tobacco cessation.      Return in about 4 weeks (around 7/24/2017) for Recheck.          This document has been electronically signed by Licha Molina MD on June 26, 2017 12:04 PM      Dictated using Dragon

## 2017-07-24 ENCOUNTER — TELEPHONE (OUTPATIENT)
Dept: ENDOCRINOLOGY | Facility: CLINIC | Age: 37
End: 2017-07-24

## 2017-08-10 ENCOUNTER — DOCUMENTATION (OUTPATIENT)
Dept: PULMONOLOGY | Facility: CLINIC | Age: 37
End: 2017-08-10

## 2017-08-10 NOTE — PROGRESS NOTES
Called by Dr. Delaney Carbone at [facility] regarding patient's recent navigation bronchoscopy.  She states that he had a planning a CT the day of procedure, but unfortunately there was no airway leading to the spiculated right upper lobe lesion.  They were able take biopsies from the left, but the pathology was nondiagnostic.  CT also noted an enlarged left axillary lymph node, and she recommended referral to surgery for excisional biopsy.  I will have my this contact the patient to schedule follow-up appointment to review these results as well as Dr. Hurtado's recommendations.

## 2017-08-14 ENCOUNTER — OFFICE VISIT (OUTPATIENT)
Dept: PULMONOLOGY | Facility: CLINIC | Age: 37
End: 2017-08-14

## 2017-08-14 VITALS
HEIGHT: 70 IN | DIASTOLIC BLOOD PRESSURE: 90 MMHG | WEIGHT: 201.7 LBS | HEART RATE: 87 BPM | OXYGEN SATURATION: 98 % | BODY MASS INDEX: 28.88 KG/M2 | SYSTOLIC BLOOD PRESSURE: 116 MMHG

## 2017-08-14 DIAGNOSIS — J45.30 MILD PERSISTENT ASTHMA WITHOUT COMPLICATION: Primary | ICD-10-CM

## 2017-08-14 DIAGNOSIS — R59.0 LAD (LYMPHADENOPATHY), AXILLARY: ICD-10-CM

## 2017-08-14 DIAGNOSIS — D86.3 CUTANEOUS SARCOIDOSIS: ICD-10-CM

## 2017-08-14 DIAGNOSIS — K21.9 GASTROESOPHAGEAL REFLUX DISEASE WITHOUT ESOPHAGITIS: ICD-10-CM

## 2017-08-14 PROCEDURE — 99214 OFFICE O/P EST MOD 30 MIN: CPT | Performed by: INTERNAL MEDICINE

## 2017-08-14 NOTE — PROGRESS NOTES
Pulmonary Office Follow-up    Subjective     Nitin Jackson is seen today at the office for   Chief Complaint   Patient presents with   • Follow-up     after biopsy         hospitals  Nitin Jackson is a 37 y.o. male with a PMH significant for tobacco use, cutaneous sarcoid, SHE CPAP, and hypertension who presents for follow up of asthma and recent navigation bronchoscopy. He was last seen on 6/26/17, at which time I referred him to the [facility] pulmonary group for possible navigation bronchoscopy which occurred a few weeks ago.  Unfortunately, there was not an airway going to the schedule a right upper lobe changes, but they were able to get biopsies from the left lung.  Pathology returned none diagnostic, but it was noted that he had an enlarged left axillary lymph node.  Patient states his breathing is stable and he continues on Qvar twice daily.  He is needing his albuterol up to twice a day, but has not received steroids recently.  He does complain of more persistent reflux symptoms for which she takes Zantac as needed.  He is also having a flare of his skin rash and requests a steroid shot today.  Patient has not been back to Dr. Marin or Dr. Boateng since his last visit with me    Patient Active Problem List   Diagnosis   • Cigarette smoker   • Cutaneous sarcoidosis   • SHE (obstructive sleep apnea)   • Essential hypertension, benign   • Allergic rhinitis   • Sarcoidosis   • LAD (lymphadenopathy), axillary       Review of Systems  Review of Systems   Respiratory: Positive for shortness of breath.    Gastrointestinal:        Heartburn   Skin: Positive for rash.     As described in the HPI. Otherwise, remainder of ROS (14 systems) were negative.    Medications, Allergies, Social, and Family Histories reviewed as per EMR.    Objective     Vitals:    08/14/17 1043   BP: 116/90   Pulse: 87   SpO2: 98%     Physical Exam   Constitutional: He is oriented to person, place, and time. Vital signs are normal. He appears  well-developed and well-nourished.   HENT:   Head: Normocephalic and atraumatic.   Nose: No mucosal edema.   Mouth/Throat: Uvula is midline, oropharynx is clear and moist and mucous membranes are normal.   Mallampati 4   Eyes: Conjunctivae, EOM and lids are normal.   Neck: Trachea normal and normal range of motion. No tracheal tenderness present. No thyroid mass present.   Cardiovascular: Normal rate, regular rhythm and normal heart sounds.  Exam reveals no gallop.    No murmur heard.  Pulmonary/Chest: Effort normal. No respiratory distress. He has no wheezes. He has no rhonchi.   Abdominal: Soft. Normal appearance and bowel sounds are normal. There is no tenderness.       Vascular Status -  His exam exhibits no right foot edema. His exam exhibits no left foot edema.  Lymphadenopathy:        Head (right side): No submandibular adenopathy present.        Head (left side): No submandibular adenopathy present.     He has no cervical adenopathy.        Right: No supraclavicular adenopathy present.        Left: No supraclavicular adenopathy present.   Neurological: He is alert and oriented to person, place, and time.   Skin: Skin is warm and dry. Rash (annular rash right forearm and knee) noted. No cyanosis. Nails show no clubbing.   Psychiatric: He has a normal mood and affect. His speech is normal and behavior is normal. Judgment normal.   Nursing note and vitals reviewed.      IMAGING: HRCT 5/2/17 (independently reviewed and interpreted by me) showed no lymphadenopathy, mildly enlarged left apical interstitial nodule injuring 1 cm, stable left apex interstitial nodule measuring 8 mm, minimal enlargement of the left upper lobe apical small nodular interstitial opacities largest measuring 1.3 cm, new right upper lobe apical interstitial nodule 6.6 mm, mild decrease in size of right upper lobe apical speculated nodule opacity 1.5 cm, minimal enlargement left upper lobe apical posterior interstitial nodular opacities  measuring 9 mm, white blood posterior segment peripheral stable patchy opacity 2.2 cm, left upper lobes.  Segment patchy interstitial opacities speedily borders not appreciated changed measuring 2.5 x 2.2 x 1.9 cm.      Assessment/Plan     Nitin was seen today for follow-up.    Diagnoses and all orders for this visit:    Mild persistent asthma without complication    Cutaneous sarcoidosis    LAD (lymphadenopathy), axillary  -     Ambulatory Referral to General Surgery    Gastroesophageal reflux disease without esophagitis         Discussion/ Recommendations:   Unfortunately, the navigation bronchoscopy is nondiagnostic, but clinically I do think the patient has ulnar and cutaneous sarcoidosis.  It is unlikely that he has malignancy seeing as the parenchymal abnormalities are stable over the past year.  He does have an enlarged left axillary lymph node which I think would benefit from an excisional biopsy to ensure no other pathology is present.  He is stable from asthma standpoint but now has some more persistent reflux symptoms.    -Continue Qvar 40 twice daily.  -Continue using albuterol as needed.  -Referral to general surgery for left axillary lymph node excisional biopsy.  -Recommend he use Zantac on a daily basis but if his reflux symptoms persist would increase to Prilosec and follow up with Dr. Boateng.  -Recommend he contact Dr. Marin office regarding rash for recommendations on treatment.  I do not feel steroid shot is beneficial for me today as he is not currently having any respiratory complaints.        Return in about 4 weeks (around 9/11/2017) for Recheck.          This document has been electronically signed by Licha Molina MD on August 14, 2017 11:35 AM      Dictated using Dragon

## 2017-08-15 ENCOUNTER — PREP FOR SURGERY (OUTPATIENT)
Dept: OTHER | Facility: HOSPITAL | Age: 37
End: 2017-08-15

## 2017-08-15 ENCOUNTER — CONSULT (OUTPATIENT)
Dept: SURGERY | Facility: CLINIC | Age: 37
End: 2017-08-15

## 2017-08-15 VITALS
BODY MASS INDEX: 28.77 KG/M2 | HEIGHT: 70 IN | SYSTOLIC BLOOD PRESSURE: 132 MMHG | WEIGHT: 201 LBS | DIASTOLIC BLOOD PRESSURE: 90 MMHG

## 2017-08-15 DIAGNOSIS — R59.0 LAD (LYMPHADENOPATHY), AXILLARY: Primary | ICD-10-CM

## 2017-08-15 DIAGNOSIS — D86.9 SARCOIDOSIS: Primary | ICD-10-CM

## 2017-08-15 PROCEDURE — 99243 OFF/OP CNSLTJ NEW/EST LOW 30: CPT | Performed by: SURGERY

## 2017-08-15 RX ORDER — SODIUM CHLORIDE 0.9 % (FLUSH) 0.9 %
1-10 SYRINGE (ML) INJECTION AS NEEDED
Status: CANCELLED | OUTPATIENT
Start: 2017-08-22

## 2017-08-15 NOTE — PROGRESS NOTES
Subjective   Nitin Jackson is a 37 y.o. male.   Referral from Licha Molina.  Chief complaint lymphadenopathy.  History of Present Illness   Mr. Jackson is a 37-year-old gentleman with presumed sarcoidosis.  He has pulmonary and cutaneous symptoms.  He's been followed by Dr. Molina and on workup they attempted biopsy proctoscopic lady.  This is not yielded a result.  CT scans reveal axillary lymphadenopathy from his presumed sarcoidosis.  Would like final pathologic confirmation of his disease before we proceed with further treatment.  He understands agreeable to this.  He several small skin lesions and don't give him much problems.  He does have shortness breath and cough and wheezing.  These are currently well controlled.    Past medical history of arthritis, asthma, heartburn.    Past surgical history bronchoscopy and shoulder surgery.      Current Outpatient Prescriptions:   •  albuterol (PROVENTIL HFA;VENTOLIN HFA) 108 (90 BASE) MCG/ACT inhaler, Inhale 2 puffs Every 4 (Four) Hours As Needed for Wheezing., Disp: 1 inhaler, Rfl: 2  •  amLODIPine (NORVASC) 5 MG tablet, Take 1 tablet by mouth Daily. For bp along with other, Disp: 90 tablet, Rfl: 3  •  beclomethasone (QVAR) 40 MCG/ACT inhaler, Inhale 2 puffs 2 (Two) Times a Day., Disp: 26.1 g, Rfl: 3  •  chlorthalidone (HYGROTON) 25 MG tablet, Take 1 tablet by mouth Daily., Disp: 30 tablet, Rfl: 1    No known drug allergies.    Family history diabetes, heart disease, cancer, high blood pressure.    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  He is  and has no kids.  He doesn't smoke and doesn't drink.  The following portions of the patient's history were reviewed and updated as appropriate: allergies, current medications, past family history, past medical history, past social history, past surgical history and problem list.    Review of Systems   Constitutional: Negative.    HENT: Negative.    Eyes: Negative.     Respiratory: Positive for cough, shortness of breath and wheezing.    Cardiovascular: Negative.    Gastrointestinal: Negative.    Endocrine: Negative.    Genitourinary: Negative.    Musculoskeletal: Negative.    Skin: Negative.    Allergic/Immunologic: Negative.    Neurological: Positive for dizziness.   Hematological: Negative.    Psychiatric/Behavioral: Negative.        Objective   Physical Exam   Constitutional: He is oriented to person, place, and time. He appears well-developed.   HENT:   Head: Normocephalic and atraumatic.   Eyes: Pupils are equal, round, and reactive to light.   Neck: Normal range of motion. Neck supple.   Cardiovascular: Normal rate, regular rhythm, normal heart sounds and intact distal pulses.    Pulmonary/Chest: Effort normal and breath sounds normal.   Abdominal: Soft. Bowel sounds are normal.   Musculoskeletal: Normal range of motion.   Neurological: He is alert and oriented to person, place, and time.   Skin: Skin is warm and dry.   Psychiatric: He has a normal mood and affect. His behavior is normal.   On exam he has no cervical lymphadenopathy.  He has one easily palpable left axillary lymph node.  CT scan is reviewed and his bilateral axillary lymphadenopathy with the largest lymph node that is superficial but 2 cm diameter.  The left axilla.    Assessment/Marylin Taveras was seen today for swollen glands.    Diagnoses and all orders for this visit:    LAD (lymphadenopathy), axillary     Mr. Jackson is a 37-year-old gentleman with axillary lymphadenopathy and sarcoidosis.  He has been referred here for excisional biopsy of this lymph node to confirm diagnosis.  I think we can do this for him at his convenience.  We'll perform a left axillary open excisional biopsy of his lymph node.  This will be done on Tuesday, August 22.  The procedure and risks, benefits, alternatives to the plan have been discussed patient and he understands and agrees.  Think you for the consult.

## 2017-08-17 ENCOUNTER — APPOINTMENT (OUTPATIENT)
Dept: PREADMISSION TESTING | Facility: HOSPITAL | Age: 37
End: 2017-08-17

## 2017-08-17 VITALS
OXYGEN SATURATION: 97 % | HEART RATE: 72 BPM | RESPIRATION RATE: 18 BRPM | DIASTOLIC BLOOD PRESSURE: 88 MMHG | SYSTOLIC BLOOD PRESSURE: 122 MMHG

## 2017-08-17 PROCEDURE — 93010 ELECTROCARDIOGRAM REPORT: CPT | Performed by: INTERNAL MEDICINE

## 2017-08-17 PROCEDURE — 93005 ELECTROCARDIOGRAM TRACING: CPT

## 2017-08-17 RX ORDER — SODIUM CHLORIDE, SODIUM GLUCONATE, SODIUM ACETATE, POTASSIUM CHLORIDE, AND MAGNESIUM CHLORIDE 526; 502; 368; 37; 30 MG/100ML; MG/100ML; MG/100ML; MG/100ML; MG/100ML
1000 INJECTION, SOLUTION INTRAVENOUS CONTINUOUS PRN
Status: CANCELLED | OUTPATIENT
Start: 2017-08-22

## 2017-08-17 NOTE — DISCHARGE INSTRUCTIONS
[facility]  Pre-op Information and Guidelines    You will be called after 2 p.m. the day before your surgery (Friday for Monday surgery) and notified of your time for arrival and approximate surgery time.  If you have not received a call by 4P.M., please contact Same Day Surgery at [phone] of if outside [facility] call [phone].    Please Follow these Important Safety Guidelines:    • The morning of your procedure, take only the medications listed below with   A sip of water:_____________________________________________       ______________________________________________    • DO NOT eat or drink anything after 12:00 midnight the night before surgery  Specific instructions concerning drinking clear liquids will be discussed during  the pre-surgery instruction call the day before your surgery.    • If you take a blood thinner (ex. Plavix, Coumadin, aspirin), ask your doctor when to stop it before surgery  STOP DATE: _________________    • Only 2 visitors are allowed in patient rooms at a time  Your visitors will be asked to wait in the lobby until the admission process is complete with the exception of a parent with a child and patients in need of special assistance.    • YOU CANNOT DRIVE YOURSELF HOME  You must be accompanied by someone who will be responsible for driving you home after surgery and for your care at home.    • DO NOT chew gum, use breath mints, hard candy, or smoke the day of surgery  • DO NOT drink alcohol for at least 24 hours before your surgery  • DO NOT wear any jewelry and remove all body piercing before coming to the hospital  • DO NOT wear make-up to the hospital  • If you are having surgery on an extremity (arm/leg/foot) remove nail polish/artificial nails on the surgical side  • Clothing, glasses, contacts, dentures, and hairpieces must be removed before surgery  • Bathe the night before or the morning of your surgery and do not use powders/lotions on  skin.

## 2017-08-22 ENCOUNTER — ANESTHESIA (OUTPATIENT)
Dept: PERIOP | Facility: HOSPITAL | Age: 37
End: 2017-08-22

## 2017-08-22 ENCOUNTER — HOSPITAL ENCOUNTER (OUTPATIENT)
Facility: HOSPITAL | Age: 37
Setting detail: HOSPITAL OUTPATIENT SURGERY
Discharge: HOME | End: 2017-08-22
Attending: SURGERY | Admitting: SURGERY

## 2017-08-22 ENCOUNTER — ANESTHESIA EVENT (OUTPATIENT)
Dept: PERIOP | Facility: HOSPITAL | Age: 37
End: 2017-08-22

## 2017-08-22 VITALS
TEMPERATURE: 96.6 F | WEIGHT: 197.53 LBS | HEIGHT: 70 IN | BODY MASS INDEX: 28.28 KG/M2 | HEART RATE: 76 BPM | RESPIRATION RATE: 18 BRPM | OXYGEN SATURATION: 95 % | SYSTOLIC BLOOD PRESSURE: 131 MMHG | DIASTOLIC BLOOD PRESSURE: 94 MMHG

## 2017-08-22 DIAGNOSIS — D86.9 SARCOIDOSIS: ICD-10-CM

## 2017-08-22 PROCEDURE — 80500 TISSUE PATHOLOGY EXAM: CPT | Performed by: PATHOLOGY

## 2017-08-22 PROCEDURE — 38500 BIOPSY/REMOVAL LYMPH NODES: CPT | Performed by: SURGERY

## 2017-08-22 PROCEDURE — 25010000003 CEFAZOLIN PER 500 MG: Performed by: SURGERY

## 2017-08-22 PROCEDURE — 25010000002 ONDANSETRON PER 1 MG: Performed by: NURSE ANESTHETIST, CERTIFIED REGISTERED

## 2017-08-22 PROCEDURE — 88342 IMHCHEM/IMCYTCHM 1ST ANTB: CPT | Performed by: PATHOLOGY

## 2017-08-22 PROCEDURE — 88312 SPECIAL STAINS GROUP 1: CPT | Performed by: SURGERY

## 2017-08-22 PROCEDURE — 88307 TISSUE EXAM BY PATHOLOGIST: CPT | Performed by: SURGERY

## 2017-08-22 PROCEDURE — 88307 TISSUE EXAM BY PATHOLOGIST: CPT | Performed by: PATHOLOGY

## 2017-08-22 PROCEDURE — 88312 SPECIAL STAINS GROUP 1: CPT | Performed by: PATHOLOGY

## 2017-08-22 PROCEDURE — 88323 CONSLTJ&REPRT MATRL PREP SLD: CPT

## 2017-08-22 PROCEDURE — 88341 IMHCHEM/IMCYTCHM EA ADD ANTB: CPT

## 2017-08-22 PROCEDURE — 25010000002 MEPERIDINE 25 MG/0.5ML SOLUTION: Performed by: NURSE ANESTHETIST, CERTIFIED REGISTERED

## 2017-08-22 PROCEDURE — 88342 IMHCHEM/IMCYTCHM 1ST ANTB: CPT | Performed by: SURGERY

## 2017-08-22 RX ORDER — ONDANSETRON 2 MG/ML
4 INJECTION INTRAMUSCULAR; INTRAVENOUS ONCE AS NEEDED
Status: DISCONTINUED | OUTPATIENT
Start: 2017-08-22 | End: 2017-08-22 | Stop reason: HOSPADM

## 2017-08-22 RX ORDER — ALBUTEROL SULFATE 2.5 MG/3ML
2.5 SOLUTION RESPIRATORY (INHALATION) ONCE AS NEEDED
Status: DISCONTINUED | OUTPATIENT
Start: 2017-08-22 | End: 2017-08-22 | Stop reason: HOSPADM

## 2017-08-22 RX ORDER — ONDANSETRON 2 MG/ML
INJECTION INTRAMUSCULAR; INTRAVENOUS AS NEEDED
Status: DISCONTINUED | OUTPATIENT
Start: 2017-08-22 | End: 2017-08-22 | Stop reason: SURG

## 2017-08-22 RX ORDER — SODIUM CHLORIDE, SODIUM GLUCONATE, SODIUM ACETATE, POTASSIUM CHLORIDE, AND MAGNESIUM CHLORIDE 526; 502; 368; 37; 30 MG/100ML; MG/100ML; MG/100ML; MG/100ML; MG/100ML
1000 INJECTION, SOLUTION INTRAVENOUS CONTINUOUS PRN
Status: DISCONTINUED | OUTPATIENT
Start: 2017-08-22 | End: 2017-08-22 | Stop reason: HOSPADM

## 2017-08-22 RX ORDER — BUPIVACAINE HYDROCHLORIDE AND EPINEPHRINE 2.5; 5 MG/ML; UG/ML
INJECTION, SOLUTION EPIDURAL; INFILTRATION; INTRACAUDAL; PERINEURAL AS NEEDED
Status: DISCONTINUED | OUTPATIENT
Start: 2017-08-22 | End: 2017-08-22 | Stop reason: HOSPADM

## 2017-08-22 RX ORDER — SODIUM CHLORIDE 0.9 % (FLUSH) 0.9 %
1-10 SYRINGE (ML) INJECTION AS NEEDED
Status: DISCONTINUED | OUTPATIENT
Start: 2017-08-22 | End: 2017-08-22 | Stop reason: HOSPADM

## 2017-08-22 RX ADMIN — CEFAZOLIN SODIUM 2 G: 1 INJECTION, POWDER, FOR SOLUTION INTRAMUSCULAR; INTRAVENOUS at 07:28

## 2017-08-22 RX ADMIN — MEPERIDINE HYDROCHLORIDE 12.5 MG: 50 INJECTION, SOLUTION INTRAMUSCULAR; INTRAVENOUS; SUBCUTANEOUS at 08:07

## 2017-08-22 RX ADMIN — SODIUM CHLORIDE, SODIUM GLUCONATE, SODIUM ACETATE, POTASSIUM CHLORIDE, AND MAGNESIUM CHLORIDE 1000 ML: 526; 502; 368; 37; 30 INJECTION, SOLUTION INTRAVENOUS at 06:09

## 2017-08-22 RX ADMIN — ONDANSETRON 4 MG: 2 INJECTION INTRAMUSCULAR; INTRAVENOUS at 07:39

## 2017-08-22 RX ADMIN — MEPERIDINE HYDROCHLORIDE 12.5 MG: 50 INJECTION, SOLUTION INTRAMUSCULAR; INTRAVENOUS; SUBCUTANEOUS at 08:01

## 2017-08-22 NOTE — PLAN OF CARE
Problem: Patient Care Overview (Adult)  Goal: Plan of Care Review  Outcome: Outcome(s) achieved Date Met:  08/22/17  Discharge criteria met

## 2017-08-22 NOTE — ANESTHESIA PROCEDURE NOTES
Airway    General Information and Staff    Patient location during procedure: OR    Indications and Patient Condition  Indications for airway management: airway protection    Preoxygenated: yes  MILS maintained throughout  Mask difficulty assessment: 0 - not attempted    Final Airway Details  Final airway type: supraglottic airway      Successful airway: classic  Size 4    Number of attempts at approach: 1

## 2017-08-22 NOTE — ANESTHESIA PREPROCEDURE EVALUATION
Anesthesia Evaluation     NPO Solid Status: > 8 hours       Airway   Mallampati: II  TM distance: >3 FB  Neck ROM: full  no difficulty expected  Dental    (+) poor dentition    Pulmonary    (+) a smoker Current, asthma, shortness of breath, sleep apnea on CPAP, decreased breath sounds,     ROS comment: Sarcoid lesions noted on CT of chest which also revealed a left axillary lesion prompting todays biopsy procedure.  Cardiovascular     Rhythm: regular  Rate: normal    (+) hypertension poorly controlled,       Neuro/Psych  (+) dizziness/light headedness,    GI/Hepatic/Renal/Endo      Musculoskeletal     Abdominal     Abdomen: soft.   Substance History      OB/GYN          Other   (+) arthritis                                   Anesthesia Plan    ASA 2     general     intravenous induction   Anesthetic plan and risks discussed with patient.

## 2017-08-22 NOTE — ANESTHESIA POSTPROCEDURE EVALUATION
Patient: Nitin Jackson    Procedure Summary     Date Anesthesia Start Anesthesia Stop Room / Location    08/22/17 0715   [facility] OR 09 / [facility]       Procedure Diagnosis Surgeon Provider    LEFT AXILLARY LYMPH NODE BIOPSYl (Left Breast) Sarcoidosis  (Sarcoidosis [D86.9]) MD Nino Steele MD          Anesthesia Type: general  Last vitals  BP        Temp        Pulse       Resp        SpO2          Post Anesthesia Care and Evaluation    Patient location during evaluation: PACU  Patient participation: complete - patient participated  Level of consciousness: awake  Pain score: 0  Pain management: adequate  Airway patency: patent  Anesthetic complications: No anesthetic complications  PONV Status: none  Cardiovascular status: acceptable  Respiratory status: acceptable  Hydration status: acceptable

## 2017-08-22 NOTE — PLAN OF CARE
Problem: Patient Care Overview (Adult)  Goal: Plan of Care Review  Outcome: Ongoing (interventions implemented as appropriate)    08/22/17 0828   Coping/Psychosocial Response Interventions   Plan Of Care Reviewed With patient   Patient Care Overview   Progress improving   Outcome Evaluation   Outcome Summary/Follow up Plan vss, dc per anesthesia protocol, tolerating po, [facility].       Goal: Discharge Needs Assessment  Outcome: Ongoing (interventions implemented as appropriate)

## 2017-08-22 NOTE — OP NOTE
BREAST LUMPECTOMY WITH AXILLARY NODE DISSECTION  Procedure Note    Nitin Jackson  8/22/2017    Pre-op Diagnosis:   Sarcoidosis [D86.9]    Post-op Diagnosis:     Post-Op Diagnosis Codes:     * Sarcoidosis [D86.9]    Procedure/CPT® Codes:  NH BIOPSY/EXCISION, LYMPH NODE(S) [88819]    Procedure(s):  LEFT AXILLARY LYMPH NODE BIOPSYl    Surgeon(s):  Bryan Arnold MD    Anesthesia: General    Staff:   Circulator: Elvira Mohan RN  Scrub Person: Nellie Dahl  Assistant: Leatha Zhou CSA    Estimated Blood Loss: *No blood loss documented*    Specimens:                  ID Type Source Tests Collected by Time Destination   A : Left Axillary Lymph Node Tissue Lymph Node TISSUE EXAM Bryan Arnold MD 8/22/2017 0740          Drains:           Findings: 2 enlarged lymph nodes excised, 1 large 2 cm diameter but normal appearing, second small 1 cm diameter but appeared black    Complications: none    Op note: After consent was obtained patient was taken to the operating room where MAC anesthesia was induced.  The left axilla was prepped and draped in normal sterile fashion.  Timeout performed.  The left axillary lymph node adenopathy was identified and 3 cm incision was made in the inferior border of the left axilla directly over the lymph nodes.  Dissection was carried down to subcutaneous tissue into the axilla.  One enlarged 1 cm node those discolored and dark was found and excised circumferentially.  Then another larger 2 cm diameter rounded the visually normal lymph node that was also excised and sent for specimen.  The wound was washed out.  Hemostasis was achieved.  The deep fatty tissue was closed with 3-0 Vicryl and skin was approximated with 4-0 Vicryl suture.  Skin affixes applied and procedure terminated.  Patient tolerated procedure well.    Bryan Arnold MD     Date: 8/22/2017  Time: 8:04 AM

## 2017-08-22 NOTE — H&P (VIEW-ONLY)
Subjective   Nitin Jackson is a 37 y.o. male.   Referral from Licha Molina.  Chief complaint lymphadenopathy.  History of Present Illness   Mr. Jackson is a 37-year-old gentleman with presumed sarcoidosis.  He has pulmonary and cutaneous symptoms.  He's been followed by Dr. Molina and on workup they attempted biopsy proctoscopic lady.  This is not yielded a result.  CT scans reveal axillary lymphadenopathy from his presumed sarcoidosis.  Would like final pathologic confirmation of his disease before we proceed with further treatment.  He understands agreeable to this.  He several small skin lesions and don't give him much problems.  He does have shortness breath and cough and wheezing.  These are currently well controlled.    Past medical history of arthritis, asthma, heartburn.    Past surgical history bronchoscopy and shoulder surgery.      Current Outpatient Prescriptions:   •  albuterol (PROVENTIL HFA;VENTOLIN HFA) 108 (90 BASE) MCG/ACT inhaler, Inhale 2 puffs Every 4 (Four) Hours As Needed for Wheezing., Disp: 1 inhaler, Rfl: 2  •  amLODIPine (NORVASC) 5 MG tablet, Take 1 tablet by mouth Daily. For bp along with other, Disp: 90 tablet, Rfl: 3  •  beclomethasone (QVAR) 40 MCG/ACT inhaler, Inhale 2 puffs 2 (Two) Times a Day., Disp: 26.1 g, Rfl: 3  •  chlorthalidone (HYGROTON) 25 MG tablet, Take 1 tablet by mouth Daily., Disp: 30 tablet, Rfl: 1    No known drug allergies.    Family history diabetes, heart disease, cancer, high blood pressure.    Social history patient lives in [place] any self-employed as a alvarez.  He is  and has no kids.  He doesn't smoke and doesn't drink.  The following portions of the patient's history were reviewed and updated as appropriate: allergies, current medications, past family history, past medical history, past social history, past surgical history and problem list.    Review of Systems   Constitutional: Negative.    HENT: Negative.    Eyes: Negative.     Respiratory: Positive for cough, shortness of breath and wheezing.    Cardiovascular: Negative.    Gastrointestinal: Negative.    Endocrine: Negative.    Genitourinary: Negative.    Musculoskeletal: Negative.    Skin: Negative.    Allergic/Immunologic: Negative.    Neurological: Positive for dizziness.   Hematological: Negative.    Psychiatric/Behavioral: Negative.        Objective   Physical Exam   Constitutional: He is oriented to person, place, and time. He appears well-developed.   HENT:   Head: Normocephalic and atraumatic.   Eyes: Pupils are equal, round, and reactive to light.   Neck: Normal range of motion. Neck supple.   Cardiovascular: Normal rate, regular rhythm, normal heart sounds and intact distal pulses.    Pulmonary/Chest: Effort normal and breath sounds normal.   Abdominal: Soft. Bowel sounds are normal.   Musculoskeletal: Normal range of motion.   Neurological: He is alert and oriented to person, place, and time.   Skin: Skin is warm and dry.   Psychiatric: He has a normal mood and affect. His behavior is normal.   On exam he has no cervical lymphadenopathy.  He has one easily palpable left axillary lymph node.  CT scan is reviewed and his bilateral axillary lymphadenopathy with the largest lymph node that is superficial but 2 cm diameter.  The left axilla.    Assessment/Marylin Taveras was seen today for swollen glands.    Diagnoses and all orders for this visit:    LAD (lymphadenopathy), axillary     Mr. Jackson is a 37-year-old gentleman with axillary lymphadenopathy and sarcoidosis.  He has been referred here for excisional biopsy of this lymph node to confirm diagnosis.  I think we can do this for him at his convenience.  We'll perform a left axillary open excisional biopsy of his lymph node.  This will be done on Tuesday, August 22.  The procedure and risks, benefits, alternatives to the plan have been discussed patient and he understands and agrees.  Think you for the consult.

## 2017-08-29 ENCOUNTER — OFFICE VISIT (OUTPATIENT)
Dept: SURGERY | Facility: CLINIC | Age: 37
End: 2017-08-29

## 2017-08-29 VITALS
HEIGHT: 70 IN | SYSTOLIC BLOOD PRESSURE: 144 MMHG | BODY MASS INDEX: 28.63 KG/M2 | DIASTOLIC BLOOD PRESSURE: 86 MMHG | WEIGHT: 200 LBS

## 2017-08-29 DIAGNOSIS — D86.9 SARCOIDOSIS: Primary | ICD-10-CM

## 2017-08-29 LAB
LAB AP CASE REPORT: NORMAL
LAB AP DIAGNOSIS COMMENT: NORMAL
LAB AP SPECIAL STAINS: NORMAL
Lab: NORMAL
PATH REPORT.FINAL DX SPEC: NORMAL
PATH REPORT.GROSS SPEC: NORMAL

## 2017-08-29 PROCEDURE — 99024 POSTOP FOLLOW-UP VISIT: CPT | Performed by: SURGERY

## 2017-09-11 ENCOUNTER — OFFICE VISIT (OUTPATIENT)
Dept: PULMONOLOGY | Facility: CLINIC | Age: 37
End: 2017-09-11

## 2017-09-11 VITALS
OXYGEN SATURATION: 98 % | WEIGHT: 199 LBS | HEART RATE: 81 BPM | DIASTOLIC BLOOD PRESSURE: 98 MMHG | BODY MASS INDEX: 28.49 KG/M2 | SYSTOLIC BLOOD PRESSURE: 140 MMHG | HEIGHT: 70 IN

## 2017-09-11 DIAGNOSIS — J30.2 SEASONAL ALLERGIC RHINITIS, UNSPECIFIED ALLERGIC RHINITIS TRIGGER: ICD-10-CM

## 2017-09-11 DIAGNOSIS — Z87.891 PERSONAL HISTORY OF TOBACCO USE, PRESENTING HAZARDS TO HEALTH: ICD-10-CM

## 2017-09-11 DIAGNOSIS — J45.30 MILD PERSISTENT ASTHMA WITHOUT COMPLICATION: ICD-10-CM

## 2017-09-11 DIAGNOSIS — D86.9 SARCOIDOSIS: Primary | ICD-10-CM

## 2017-09-11 DIAGNOSIS — D89.89 IGG4-RELATED SCLEROSING DISEASE (HCC): ICD-10-CM

## 2017-09-11 PROBLEM — J45.909 ASTHMA: Status: ACTIVE | Noted: 2017-09-11

## 2017-09-11 PROCEDURE — 99406 BEHAV CHNG SMOKING 3-10 MIN: CPT | Performed by: INTERNAL MEDICINE

## 2017-09-11 PROCEDURE — 99214 OFFICE O/P EST MOD 30 MIN: CPT | Performed by: INTERNAL MEDICINE

## 2017-09-11 NOTE — PROGRESS NOTES
Pulmonary Office Follow-up    Subjective     Nitin Jackson is seen today at the office for   Chief Complaint   Patient presents with   • Follow-up     4 week         HPI  Nitin Jackson is a 37 y.o. male with a PMH significant for tobacco use, cutaneous sarcoid, SHE CPAP, and hypertension who presents for follow up of asthma and recent axillary lymph node excision. He was last seen on 8/17/17, at which time I referred him to Dr. Arnold for a left axillary lymph node excision area did he underwent this shortly thereafter and pathology returned with follicular lymphoid hyperplasia and focal noncaseating granulomas compatible with sarcoidosis.  There is also increased IgG 4 positive plasma cells raising the possibility of IgG 4 sclerosing autoimmune disorder.He states his breathing is doing well.  He does continue to need albuterol with any exertion.  He is using Qvar twice daily and on average needs his albuterol 1-2 times daily.  Patient does complain of persistent swelling and pain in his left axilla where his lymph node was excised.  He does continue to smoke, but is trying to cut back.  Patient has not received steroids since his last visit with me.    Patient Active Problem List   Diagnosis   • Cigarette smoker   • Cutaneous sarcoidosis   • SHE (obstructive sleep apnea)   • Essential hypertension, benign   • Allergic rhinitis   • Sarcoidosis   • LAD (lymphadenopathy), axillary   • Asthma   • IgG4-related sclerosing disease       Review of Systems  Review of Systems   Constitutional: Negative for fever and unexpected weight change.   Respiratory: Positive for cough and shortness of breath. Negative for wheezing.    Skin: Positive for rash and wound (left axilla).     As described in the HPI. Otherwise, remainder of ROS (14 systems) were negative.    Medications, Allergies, Social, and Family Histories reviewed as per EMR.    Objective     Vitals:    09/11/17 1040   BP: 140/98   Pulse: 81   SpO2: 98%      Physical Exam   Constitutional: He is oriented to person, place, and time. Vital signs are normal. He appears well-developed and well-nourished.   HENT:   Head: Normocephalic and atraumatic.   Nose: No mucosal edema.   Mouth/Throat: Uvula is midline, oropharynx is clear and moist and mucous membranes are normal.   Mallampati 4   Eyes: Conjunctivae, EOM and lids are normal.   Neck: Trachea normal and normal range of motion. No tracheal tenderness present. No thyroid mass present.   Cardiovascular: Normal rate, regular rhythm and normal heart sounds.  Exam reveals no gallop.    No murmur heard.  Pulmonary/Chest: Effort normal. No respiratory distress. He has no wheezes. He has no rhonchi.   Abdominal: Soft. Normal appearance and bowel sounds are normal. There is no tenderness.   Musculoskeletal:   Left axillary swelling/ TTP, but no erythema warmth, incision healing well with no draiange       Vascular Status -  His exam exhibits no right foot edema. His exam exhibits no left foot edema.  Lymphadenopathy:        Head (right side): No submandibular adenopathy present.        Head (left side): No submandibular adenopathy present.     He has no cervical adenopathy.        Right: No supraclavicular adenopathy present.        Left: No supraclavicular adenopathy present.   Neurological: He is alert and oriented to person, place, and time.   Skin: Skin is warm and dry. Rash (annular rash right forearm and knee) noted. No cyanosis. Nails show no clubbing.   Psychiatric: He has a normal mood and affect. His speech is normal and behavior is normal. Judgment normal.   Nursing note and vitals reviewed.      IMAGING: HRCT 5/2/17 (independently reviewed and interpreted by me) showed no lymphadenopathy, mildly enlarged left apical interstitial nodule injuring 1 cm, stable left apex interstitial nodule measuring 8 mm, minimal enlargement of the left upper lobe apical small nodular interstitial opacities largest measuring 1.3 cm,  "new right upper lobe apical interstitial nodule 6.6 mm, mild decrease in size of right upper lobe apical speculated nodule opacity 1.5 cm, minimal enlargement left upper lobe apical posterior interstitial nodular opacities measuring 9 mm, white blood posterior segment peripheral stable patchy opacity 2.2 cm, left upper lobes.  Segment patchy interstitial opacities speedily borders not appreciated changed measuring 2.5 x 2.2 x 1.9 cm.    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  FOCAL NONCASEATING GRANULOMAS, SEE COMMENT.   Electronically signed by Jesus Loving MD on 8/29/2017 at 1520   Comment       The case is submitted to the [facility] for evaluation and their report is attached.  The [facility] finds occasional clustered noncaseating granulomas in the lymph node compatible with sarcoidosis, although the granulomas are not as numerous or extensive as expected in cases of sarcoidosis.  The increase in IgG4-positive plasma cells within the germinal centers raise the possibility of IgG4 sclerosing/autoimmune disorder.   Gross Description       The container is labeled \"lymph nodes, left axilla\" and has 2 ovoid gray lymph nodes measuring 3 x 2.2 x 1.6 cm and 2 x 1.7 x 1.5 cm.  Together they weigh 6 g.  The cut surfaces are pinkish tan and soft.  The entire specimen is embedded.  1A smaller lymph node; 1B through 1G larger lymph node.   Special Stains       Histochemical stains for fungi (GMS stain) and acid-fast bacilli (AFB stain) applied to block 1 are negative for organisms.  An immunostain for BCL-2 applied to block 2 has no evidence of staining of germinal centers consistent with follicular hyperplasia of the lymph node.         Assessment/Plan     Nitin was seen today for follow-up.    Diagnoses and all orders for this visit:    Sarcoidosis  -     Ambulatory Referral to " Rheumatology    Mild persistent asthma without complication  -     beclomethasone (QVAR) 80 MCG/ACT inhaler; Inhale 1 puff 2 (Two) Times a Day.    Seasonal allergic rhinitis, unspecified allergic rhinitis trigger    IgG4-related sclerosing disease    Personal history of tobacco use, presenting hazards to health  -     nicotine polacrilex (EQ NICOTINE) 4 MG gum; Chew 1 each As Needed for Smoking Cessation.         Discussion/ Recommendations:   Axillary lymph node did show noncaseating granulomas, but also exhibited increased IgG4-positive plasma cells which may indicate possible IgG4 sclerosing/ autoimmune disorder.  I am uncertain as to the significance of this finding and recommend evaluation by a rheumatologist.  He does continue to use his albuterol frequently so I think he warrants escalation in his ICS dose.  Also, he continues to smoke which likely contributes to his ongoing dyspnea.    -Increase Qvar to 80.  -Continue using albuterol as needed.  -Referral to [facility]ology at [facility] as he is oriented systems for evaluation of 4 doses and possible IgG4 sclerosing/autoimmune disorder  -Counseled on importance of complete smoking cessation. Recommended trying nicotine gum as needed for cravings. Spent 3 mins.      Return in about 2 months (around 11/11/2017) for Recheck.          This document has been electronically signed by Licha Molina MD on September 11, 2017 11:12 AM      Dictated using Dragon

## 2019-01-20 ENCOUNTER — HOSPITAL ENCOUNTER (EMERGENCY)
Facility: HOSPITAL | Age: 39
Discharge: HOME | End: 2019-01-21
Attending: EMERGENCY MEDICINE | Admitting: EMERGENCY MEDICINE

## 2019-01-20 VITALS
HEIGHT: 70 IN | TEMPERATURE: 98.8 F | DIASTOLIC BLOOD PRESSURE: 92 MMHG | RESPIRATION RATE: 20 BRPM | BODY MASS INDEX: 27.92 KG/M2 | OXYGEN SATURATION: 95 % | WEIGHT: 195 LBS | HEART RATE: 72 BPM | SYSTOLIC BLOOD PRESSURE: 136 MMHG

## 2019-01-20 DIAGNOSIS — S16.1XXA STRAIN OF NECK MUSCLE, INITIAL ENCOUNTER: Primary | ICD-10-CM

## 2019-01-20 PROCEDURE — 99283 EMERGENCY DEPT VISIT LOW MDM: CPT

## 2019-01-20 PROCEDURE — 25010000002 KETOROLAC TROMETHAMINE PER 15 MG: Performed by: EMERGENCY MEDICINE

## 2019-01-20 PROCEDURE — 25010000002 ORPHENADRINE CITRATE PER 60 MG: Performed by: EMERGENCY MEDICINE

## 2019-01-20 PROCEDURE — 96372 THER/PROPH/DIAG INJ SC/IM: CPT

## 2019-01-20 RX ORDER — IBUPROFEN 600 MG/1
600 TABLET ORAL EVERY 6 HOURS PRN
Qty: 30 TABLET | Refills: 0 | Status: SHIPPED | OUTPATIENT
Start: 2019-01-20 | End: 2019-06-03

## 2019-01-20 RX ORDER — CYCLOBENZAPRINE HCL 10 MG
10 TABLET ORAL 3 TIMES DAILY PRN
Qty: 30 TABLET | Refills: 0 | Status: SHIPPED | OUTPATIENT
Start: 2019-01-20 | End: 2019-06-03

## 2019-01-20 RX ORDER — KETOROLAC TROMETHAMINE 30 MG/ML
60 INJECTION, SOLUTION INTRAMUSCULAR; INTRAVENOUS ONCE
Status: COMPLETED | OUTPATIENT
Start: 2019-01-20 | End: 2019-01-20

## 2019-01-20 RX ORDER — ORPHENADRINE CITRATE 30 MG/ML
60 INJECTION INTRAMUSCULAR; INTRAVENOUS ONCE
Status: COMPLETED | OUTPATIENT
Start: 2019-01-20 | End: 2019-01-20

## 2019-01-20 RX ADMIN — ORPHENADRINE CITRATE 60 MG: 30 INJECTION INTRAMUSCULAR; INTRAVENOUS at 23:23

## 2019-01-20 RX ADMIN — KETOROLAC TROMETHAMINE 60 MG: 30 INJECTION, SOLUTION INTRAMUSCULAR at 23:23

## 2019-01-21 NOTE — DISCHARGE INSTRUCTIONS
Follow-up with primary care physician for reevaluation.  Take ibuprofen/Tylenol and muscle relaxants as needed.  Return to ER for worsening.
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
## 2019-06-03 ENCOUNTER — OFFICE VISIT (OUTPATIENT)
Dept: FAMILY MEDICINE CLINIC | Facility: CLINIC | Age: 39
End: 2019-06-03

## 2019-06-03 ENCOUNTER — APPOINTMENT (OUTPATIENT)
Dept: LAB | Facility: HOSPITAL | Age: 39
End: 2019-06-03

## 2019-06-03 VITALS
SYSTOLIC BLOOD PRESSURE: 148 MMHG | DIASTOLIC BLOOD PRESSURE: 92 MMHG | BODY MASS INDEX: 28.26 KG/M2 | HEART RATE: 70 BPM | HEIGHT: 70 IN | OXYGEN SATURATION: 95 % | WEIGHT: 197.4 LBS

## 2019-06-03 DIAGNOSIS — D86.9 SARCOIDOSIS: Primary | ICD-10-CM

## 2019-06-03 DIAGNOSIS — Z72.0 TOBACCO USE: ICD-10-CM

## 2019-06-03 DIAGNOSIS — J45.30 MILD PERSISTENT ASTHMA WITHOUT COMPLICATION: ICD-10-CM

## 2019-06-03 DIAGNOSIS — K21.9 GASTROESOPHAGEAL REFLUX DISEASE, ESOPHAGITIS PRESENCE NOT SPECIFIED: ICD-10-CM

## 2019-06-03 DIAGNOSIS — L56.8 PHOTOSENSITIVITY DERMATITIS DUE TO SUN: ICD-10-CM

## 2019-06-03 DIAGNOSIS — I10 ESSENTIAL HYPERTENSION: ICD-10-CM

## 2019-06-03 LAB
ALBUMIN SERPL-MCNC: 4.3 G/DL (ref 3.5–5.2)
ALBUMIN/GLOB SERPL: 1.4 G/DL
ALP SERPL-CCNC: 95 U/L (ref 39–117)
ALT SERPL W P-5'-P-CCNC: 26 U/L (ref 1–41)
ANION GAP SERPL CALCULATED.3IONS-SCNC: 8.4 MMOL/L
AST SERPL-CCNC: 24 U/L (ref 1–40)
BASOPHILS # BLD AUTO: 0.02 10*3/MM3 (ref 0–0.2)
BASOPHILS NFR BLD AUTO: 0.3 % (ref 0–1.5)
BILIRUB SERPL-MCNC: 0.9 MG/DL (ref 0.2–1.2)
BUN BLD-MCNC: 13 MG/DL (ref 6–20)
BUN/CREAT SERPL: 11.2 (ref 7–25)
CALCIUM SPEC-SCNC: 9.1 MG/DL (ref 8.6–10.5)
CHLORIDE SERPL-SCNC: 104 MMOL/L (ref 98–107)
CO2 SERPL-SCNC: 26.6 MMOL/L (ref 22–29)
CREAT BLD-MCNC: 1.16 MG/DL (ref 0.76–1.27)
DEPRECATED RDW RBC AUTO: 41.8 FL (ref 37–54)
EOSINOPHIL # BLD AUTO: 0.3 10*3/MM3 (ref 0–0.4)
EOSINOPHIL NFR BLD AUTO: 4.6 % (ref 0.3–6.2)
ERYTHROCYTE [DISTWIDTH] IN BLOOD BY AUTOMATED COUNT: 12.6 % (ref 12.3–15.4)
GFR SERPL CREATININE-BSD FRML MDRD: 85 ML/MIN/1.73
GLOBULIN UR ELPH-MCNC: 3.1 GM/DL
GLUCOSE BLD-MCNC: 94 MG/DL (ref 65–99)
HCT VFR BLD AUTO: 46.5 % (ref 37.5–51)
HGB BLD-MCNC: 15.5 G/DL (ref 13–17.7)
IMM GRANULOCYTES # BLD AUTO: 0.02 10*3/MM3 (ref 0–0.05)
IMM GRANULOCYTES NFR BLD AUTO: 0.3 % (ref 0–0.5)
LYMPHOCYTES # BLD AUTO: 2.65 10*3/MM3 (ref 0.7–3.1)
LYMPHOCYTES NFR BLD AUTO: 40.5 % (ref 19.6–45.3)
MCH RBC QN AUTO: 30.4 PG (ref 26.6–33)
MCHC RBC AUTO-ENTMCNC: 33.3 G/DL (ref 31.5–35.7)
MCV RBC AUTO: 91.2 FL (ref 79–97)
MONOCYTES # BLD AUTO: 0.97 10*3/MM3 (ref 0.1–0.9)
MONOCYTES NFR BLD AUTO: 14.8 % (ref 5–12)
NEUTROPHILS # BLD AUTO: 2.59 10*3/MM3 (ref 1.7–7)
NEUTROPHILS NFR BLD AUTO: 39.5 % (ref 42.7–76)
NRBC BLD AUTO-RTO: 0 /100 WBC (ref 0–0.2)
PLATELET # BLD AUTO: 181 10*3/MM3 (ref 140–450)
PMV BLD AUTO: 12.8 FL (ref 6–12)
POTASSIUM BLD-SCNC: 4.2 MMOL/L (ref 3.5–5.2)
PROT SERPL-MCNC: 7.4 G/DL (ref 6–8.5)
RBC # BLD AUTO: 5.1 10*6/MM3 (ref 4.14–5.8)
SODIUM BLD-SCNC: 139 MMOL/L (ref 136–145)
WBC NRBC COR # BLD: 6.55 10*3/MM3 (ref 3.4–10.8)

## 2019-06-03 PROCEDURE — 80053 COMPREHEN METABOLIC PANEL: CPT | Performed by: FAMILY MEDICINE

## 2019-06-03 PROCEDURE — 99406 BEHAV CHNG SMOKING 3-10 MIN: CPT | Performed by: FAMILY MEDICINE

## 2019-06-03 PROCEDURE — 36415 COLL VENOUS BLD VENIPUNCTURE: CPT | Performed by: FAMILY MEDICINE

## 2019-06-03 PROCEDURE — 85025 COMPLETE CBC W/AUTO DIFF WBC: CPT | Performed by: FAMILY MEDICINE

## 2019-06-03 PROCEDURE — 99213 OFFICE O/P EST LOW 20 MIN: CPT | Performed by: FAMILY MEDICINE

## 2019-06-03 RX ORDER — AMLODIPINE BESYLATE 5 MG/1
5 TABLET ORAL DAILY
Qty: 30 TABLET | Refills: 0 | Status: SHIPPED | OUTPATIENT
Start: 2019-06-03 | End: 2019-06-26

## 2019-06-03 RX ORDER — PREDNISONE 20 MG/1
20 TABLET ORAL DAILY
Status: CANCELLED | OUTPATIENT
Start: 2019-06-03

## 2019-06-03 RX ORDER — RANITIDINE 150 MG/1
150 TABLET ORAL DAILY
COMMUNITY
End: 2019-06-03

## 2019-06-03 RX ORDER — BETAMETHASONE DIPROPIONATE 0.5 MG/G
LOTION TOPICAL 2 TIMES DAILY
Qty: 60 ML | Refills: 0 | OUTPATIENT
Start: 2019-06-03 | End: 2020-05-30

## 2019-06-03 RX ORDER — RANITIDINE 150 MG/1
150 TABLET ORAL DAILY
Status: CANCELLED | OUTPATIENT
Start: 2019-06-03

## 2019-06-03 RX ORDER — ALBUTEROL SULFATE 90 UG/1
2 AEROSOL, METERED RESPIRATORY (INHALATION) EVERY 4 HOURS PRN
Qty: 1 INHALER | Refills: 5 | Status: SHIPPED | OUTPATIENT
Start: 2019-06-03 | End: 2021-02-02 | Stop reason: SDUPTHER

## 2019-06-03 RX ORDER — OMEPRAZOLE 40 MG/1
40 CAPSULE, DELAYED RELEASE ORAL
Qty: 40 CAPSULE | Refills: 5 | Status: SHIPPED | OUTPATIENT
Start: 2019-06-03 | End: 2020-02-05

## 2019-06-03 RX ORDER — FLUTICASONE PROPIONATE 50 MCG
1 SPRAY, SUSPENSION (ML) NASAL DAILY
Qty: 9.9 ML | Refills: 5 | Status: SHIPPED | OUTPATIENT
Start: 2019-06-03 | End: 2021-02-02 | Stop reason: SDUPTHER

## 2019-06-03 RX ORDER — FLUTICASONE PROPIONATE 50 MCG
1 SPRAY, SUSPENSION (ML) NASAL DAILY
COMMUNITY
End: 2019-06-03 | Stop reason: SDUPTHER

## 2019-06-03 RX ORDER — PREDNISONE 20 MG/1
20 TABLET ORAL DAILY
COMMUNITY
End: 2019-08-28

## 2019-06-03 NOTE — PROGRESS NOTES
I have seen the patient.  I have reviewed the notes, assessments, and/or procedures performed by dr Medellin, I concur with her/his documentation and assessment and plan for Nitin Jackson.               This document has been electronically signed by Alexis Stearns MD on Selina 3, 2019 4:34 PM

## 2019-06-03 NOTE — PROGRESS NOTES
Subjective:     Nitin Jackson is a 38 y.o. male who presents for follow up for sarcoidosis.  Patient lost his health insurance and has not been able to seek care.    Sarcoidosis  Patient here for follow up of sarcoidosis.  Symptoms have been present for 3 years. Onset was gradual. Symptoms include shortness of breath and skin patches and GERD with dyspnea on exertion and are of mild severity. Patient denies  constant cough. Symptoms are made worse by: any exercise. Symptoms are helped by inhalers and steroids.  Associated symptoms include rashes/photosensitive. Patient denies associated arthralgias, fevers, or oral ulcers. Overall disease activity:  stable. Limitation on activities include none. Recent fever/chills/sweats: no.  Recent hospitalizations: no.  Appetite: ok. Weight: has been stable.  Recent prednisone taper: no.    Hypertension   Patient is a 38 y.o. male who presents for follow-up of hypertension. His high blood pressure was first noted at doctor's office a few years ago. Home blood pressure readings: not doing. Salt intake and diet: salt added to cooking. Usual weight: 190s. Associated signs and symptoms: none. Patient denies: chest pain, paroxysmal nocturnal dyspnea and peripheral edema. Use of agents associated with hypertension: steroids. Medication compliance: not currently on medications for this problem.    GERD  Paitent complains of heartburn. This has been associated with heartburn and midespigastric pain.  He denies hematemesis, melena and unexpected weight loss. Symptoms have been present for a few months. He denies dysphagia. He has not lost weight. He denies melena, hematochezia, hematemesis, and coffee ground emesis. Medical therapy in the past has included H2 antagonists.    Preventative:  On osteoporosis therapy? 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   • DJD (degenerative joint disease)     shoulder region, right side   • Dyspnea on exertion    • Elevated blood pressure    • Encounter for routine adult health examination    • Fatigue    • Hypertension     \"NOT TAKING MEDS THAT WERE PRESCRIBED\"   • Obstructive sleep apnea    • Obstructive sleep apnea syndrome 8/24/2016   • Pain in left knee    • Vertigo        Past Surgical Hx:  Past Surgical History:   Procedure Laterality Date   • BREAST LUMPECTOMY WITH AXILLARY NODE DISSECTION Left 8/22/2017    Procedure: LEFT AXILLARY LYMPH NODE BIOPSYl;  Surgeon: Bryan Arnold MD;  Location: [facility];  Service:    • INJECTION OF MEDICATION  08/10/2016    Kenalog   • SHOULDER ARTHROSCOPY  01/13/2015    Arthroscopy of shoulder, subacromial decompression, Reed, and injection of the shoulder with 80 ml Kenalog   • SHOULDER SURGERY         Health Maintenance:  Health Maintenance   Topic Date Due   • ANNUAL PHYSICAL  07/03/1981   • TDAP/TD VACCINES (1 - Tdap) 07/03/1997   • INFLUENZA VACCINE  08/01/2019       Current Meds:    Current Outpatient Medications:   •  albuterol (PROVENTIL HFA;VENTOLIN HFA) 108 (90 BASE) MCG/ACT inhaler, Inhale 2 puffs Every 4 (Four) Hours As Needed for Wheezing., Disp: 1 inhaler, Rfl: 2  •  beclomethasone (QVAR) 80 MCG/ACT inhaler, Inhale 1 puff 2 (Two) Times a Day., Disp: 1 inhaler, Rfl: 11  •  fluticasone (FLONASE) 50 MCG/ACT nasal spray, 1 spray into the nostril(s) as directed by provider Daily., Disp: , Rfl:   •  predniSONE (DELTASONE) 20 MG tablet, Take 20 mg by mouth Daily., Disp: , Rfl:   •  raNITIdine (ZANTAC) 150 MG tablet, Take 150 mg by mouth Daily., Disp: , Rfl:     Allergies:  Patient has no known allergies.    Family Hx:  Family History   Problem Relation Age of Onset   • Diabetes Other    • Heart disease Other    • Hypertension Other         Social History:  Social History     Socioeconomic History   • Marital status:      " "Spouse name: Not on file   • Number of children: Not on file   • Years of education: Not on file   • Highest education level: Not on file   Tobacco Use   • Smoking status: Former Smoker     Packs/day: 1.00     Years: 20.00     Pack years: 20.00     Types: Cigarettes   • Smokeless tobacco: Never Used   Substance and Sexual Activity   • Alcohol use: Yes     Comment: SOCIAL   • Drug use: No   • Sexual activity: Defer       Review of Systems  Review of Systems   Constitutional: Negative for chills, diaphoresis, fatigue and fever.   HENT: Positive for congestion. Negative for rhinorrhea, sneezing and sore throat.    Respiratory: Negative for cough and shortness of breath.    Cardiovascular: Negative for chest pain and leg swelling.   Gastrointestinal: Positive for abdominal pain. Negative for constipation, diarrhea, nausea and vomiting.   Genitourinary: Negative for difficulty urinating and hematuria.   Musculoskeletal: Negative for gait problem and joint swelling.   Skin: Positive for rash. Negative for wound.   Neurological: Negative for seizures, syncope and headaches.   Psychiatric/Behavioral: Negative for confusion and sleep disturbance.       Objective:     /92 (BP Location: Left arm, Patient Position: Sitting, Cuff Size: Adult)   Pulse 70   Ht 177.8 cm (70\")   Wt 89.5 kg (197 lb 6.4 oz)   SpO2 95%   BMI 28.32 kg/m²     Physical Exam   Constitutional: He is oriented to person, place, and time. He appears well-developed and well-nourished. No distress. He appears overweight.   HENT:   Head: Normocephalic and atraumatic.   Nose: Nose normal.   Mouth/Throat: Oropharynx is clear and moist.   Eyes: Conjunctivae are normal. Pupils are equal, round, and reactive to light.   Cardiovascular: Normal rate, regular rhythm, normal heart sounds and intact distal pulses.   Pulmonary/Chest: Effort normal and breath sounds normal.   Abdominal: Soft. Bowel sounds are normal. There is no tenderness.   Neurological: He is " alert and oriented to person, place, and time.   Skin: Skin is warm and dry. Capillary refill takes less than 2 seconds. Rash noted. He is not diaphoretic.   Diffuse circular patches of hyperpigmented and coarse skin over extremities.   Psychiatric: He has a normal mood and affect. His behavior is normal. Judgment and thought content normal.   Vitals reviewed.      Lab Results   Component Value Date    WBC 14.71 (H) 04/20/2017    HGB 15.7 04/20/2017    HCT 44.7 04/20/2017    MCV 88.7 04/20/2017     04/20/2017     Lab Results   Component Value Date    GLUCOSE 78 01/11/2017    BUN 16 01/11/2017    CREATININE 1.2 01/11/2017    K 3.8 01/11/2017    CO2 31 01/11/2017    CALCIUM 8.9 01/11/2017    ALBUMIN 4.50 04/20/2017    AST 43 04/20/2017    ALT 30 04/20/2017        Assessment/Plan:     Nitin was seen today for establish care.    Diagnoses and all orders for this visit:    Sarcoidosis  -     albuterol sulfate  (90 Base) MCG/ACT inhaler; Inhale 2 puffs Every 4 (Four) Hours As Needed for Wheezing or Shortness of Air.  -     beclomethasone (QVAR) 80 MCG/ACT inhaler; Inhale 1 puff 2 (Two) Times a Day.  -     fluticasone (FLONASE) 50 MCG/ACT nasal spray; 1 spray into the nostril(s) as directed by provider Daily.  -     betamethasone dipropionate (DIPROLENE) 0.05 % lotion; Apply  topically to the appropriate area as directed 2 (Two) Times a Day.  -     omeprazole (priLOSEC) 40 MG capsule; Take 1 capsule by mouth Every Morning Before Breakfast. On an empty stomach for acid reflux.  -     XR Chest 2 View; Future  -     CBC Auto Differential  -     Comprehensive Metabolic Panel  -     Ambulatory Referral to Rheumatology    Essential hypertension  -     amLODIPine (NORVASC) 5 MG tablet; Take 1 tablet by mouth Daily.  - Patient is hypertensive in office.  - Encouraged 30 minutes of moderate intensity activity at least 5 days a week.    Gastroesophageal reflux disease, esophagitis presence not specified  -      omeprazole (priLOSEC) 40 MG capsule; Take 1 capsule by mouth Every Morning Before Breakfast. On an empty stomach for acid reflux.  - Discussed a GERD appropriate diet.     Mild persistent asthma without complication  -     albuterol sulfate  (90 Base) MCG/ACT inhaler; Inhale 2 puffs Every 4 (Four) Hours As Needed for Wheezing or Shortness of Air.  -     beclomethasone (QVAR) 80 MCG/ACT inhaler; Inhale 1 puff 2 (Two) Times a Day.  -     fluticasone (FLONASE) 50 MCG/ACT nasal spray; 1 spray into the nostril(s) as directed by provider Daily.  -     XR Chest 2 View; Future    Tobacco use  -     nicotine (NICODERM CQ) 7 MG/24HR patch; Place 1 patch on the skin as directed by provider Daily.  -     XR Chest 2 View; Future  - Encouraged smoking cessation; spent approximately 3 minutes discussing health risk associated with tobacco use.     Photosensitivity dermatitis due to sun  -     betamethasone dipropionate (DIPROLENE) 0.05 % lotion; Apply  topically to the appropriate area as directed 2 (Two) Times a Day.      Follow-up:     Return in about 2 weeks (around 6/17/2019) for Recheck sarcoid.    Goals        Patient Stated    • Sarcoidosis Management (pt-stated)      Barrier to goal:   1. Lack of health insurance.  2. Lost to follow up.            Preventative:  -Patient's Body mass index is 28.32 kg/m². BMI is above normal parameters. Recommendations include: exercise counseling and nutrition counseling.    Vaccines:  Immunization History   Administered Date(s) Administered   • Influenza TIV (IM) 01/11/2017       Tetanus vaccine: not up to date. Follow-up at next visit.  Annual influenza vaccine: not up to date. Will address during influenza season.  Pneumococcal vaccine: not up to date. Will address at next visit.    RISK SCORE: 3    Signature  Tea Medellin MD  [facility] Family Medicine Resident, PGY III        This document has been electronically signed by Tea Medellin MD on Selina 3, 2019 9:53 AM

## 2019-06-24 PROBLEM — R91.8 MULTIPLE PULMONARY NODULES: Status: ACTIVE | Noted: 2017-07-25

## 2019-06-24 PROBLEM — I10 HTN (HYPERTENSION): Status: ACTIVE | Noted: 2017-07-25

## 2019-06-24 PROBLEM — R06.09 DOE (DYSPNEA ON EXERTION): Status: ACTIVE | Noted: 2017-07-25

## 2019-06-26 ENCOUNTER — OFFICE VISIT (OUTPATIENT)
Dept: FAMILY MEDICINE CLINIC | Facility: CLINIC | Age: 39
End: 2019-06-26

## 2019-06-26 VITALS
HEIGHT: 70 IN | DIASTOLIC BLOOD PRESSURE: 92 MMHG | BODY MASS INDEX: 28.92 KG/M2 | WEIGHT: 202 LBS | OXYGEN SATURATION: 98 % | SYSTOLIC BLOOD PRESSURE: 150 MMHG

## 2019-06-26 DIAGNOSIS — D86.3 CUTANEOUS SARCOIDOSIS: ICD-10-CM

## 2019-06-26 DIAGNOSIS — F17.210 CIGARETTE SMOKER: ICD-10-CM

## 2019-06-26 DIAGNOSIS — G47.33 OSA (OBSTRUCTIVE SLEEP APNEA): ICD-10-CM

## 2019-06-26 DIAGNOSIS — I10 ESSENTIAL HYPERTENSION: Primary | ICD-10-CM

## 2019-06-26 DIAGNOSIS — G44.201 ACUTE INTRACTABLE TENSION-TYPE HEADACHE: ICD-10-CM

## 2019-06-26 PROCEDURE — 99406 BEHAV CHNG SMOKING 3-10 MIN: CPT | Performed by: FAMILY MEDICINE

## 2019-06-26 PROCEDURE — 99213 OFFICE O/P EST LOW 20 MIN: CPT | Performed by: FAMILY MEDICINE

## 2019-06-26 RX ORDER — NICOTINE 21 MG/24HR
1 PATCH, TRANSDERMAL 24 HOURS TRANSDERMAL EVERY 24 HOURS
Qty: 28 EACH | Refills: 2 | Status: SHIPPED | OUTPATIENT
Start: 2019-06-26 | End: 2019-08-28

## 2019-06-26 RX ORDER — CHLORTHALIDONE 25 MG/1
12.5 TABLET ORAL DAILY
Qty: 30 TABLET | Refills: 1 | Status: SHIPPED | OUTPATIENT
Start: 2019-06-26 | End: 2020-02-05 | Stop reason: SDUPTHER

## 2019-06-26 RX ORDER — PREDNISONE 10 MG/1
TABLET ORAL
Qty: 48 EACH | Refills: 1 | Status: SHIPPED | OUTPATIENT
Start: 2019-06-26 | End: 2019-08-28

## 2019-06-26 NOTE — PROGRESS NOTES
I have seen the patient.  I have reviewed the notes, assessments, and/or procedures performed by Delaney Buchanan MD during office visit I concur with her/his documentation and assessment and plan for Nitin Jackson.            This document has been electronically signed by Poli Verdugo MD on June 26, 2019 4:54 PM

## 2019-06-26 NOTE — PROGRESS NOTES
"  Subjective:     Nitin Jackson is a 38 y.o. male who presents for follow up for cutaneous sarcoid    Patient reports that he has been using the Diprolene cream topically since his last appointment 2 weeks ago with no improvement.  He reports that he has a follow-up with rheumatology on July 22.  We discussed the results of his last chest x-ray which shows worsening of the lung nodules as they are increasing in size since last imaging.  He reports that he was previously followed by a pulmonologist and has seen Dr. Molina in the past.      He reports that he was started on Norvasc and began having a daily headache.  He reports that the pain is dull and throbbing in his right occiput and radiates down his right neck and shoulder.  He stopped taking the Norvasc and that did decrease the frequency of his headache but he is continuing to have a headache.  He does not wake up with headache but notices onset shortly after getting up in the morning.  He denies any associated symptoms.  He reports that he does not regularly drink water, reporting the last time he had any water to drink was possibly yesterday.        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   • DJD (degenerative joint disease)     shoulder region, right side   • Dyspnea on exertion    • Elevated blood pressure    • Encounter for routine adult health examination    • Fatigue    • Hypertension     \"NOT TAKING MEDS THAT WERE PRESCRIBED\"   • Obstructive sleep apnea    • Obstructive sleep apnea syndrome 8/24/2016   • Pain in left knee    • Vertigo        Past Surgical Hx:  Past Surgical History:   Procedure Laterality Date   • BREAST LUMPECTOMY WITH AXILLARY NODE DISSECTION Left 8/22/2017    Procedure: LEFT AXILLARY LYMPH NODE BIOPSYl;  Surgeon: Bryan Arnold MD;  Location:  [facility];  " Service:    • INJECTION OF MEDICATION  08/10/2016    Kenalog   • SHOULDER ARTHROSCOPY  01/13/2015    Arthroscopy of shoulder, subacromial decompression, Reed, and injection of the shoulder with 80 ml Kenalog   • SHOULDER SURGERY         Health Maintenance:  Health Maintenance   Topic Date Due   • ANNUAL PHYSICAL  07/03/1981   • TDAP/TD VACCINES (1 - Tdap) 07/03/1997   • INFLUENZA VACCINE  08/01/2019       Current Meds:    Current Outpatient Medications:   •  albuterol sulfate  (90 Base) MCG/ACT inhaler, Inhale 2 puffs Every 4 (Four) Hours As Needed for Wheezing or Shortness of Air., Disp: 1 inhaler, Rfl: 5  •  beclomethasone (QVAR) 80 MCG/ACT inhaler, Inhale 1 puff 2 (Two) Times a Day., Disp: 1 inhaler, Rfl: 5  •  betamethasone dipropionate (DIPROLENE) 0.05 % lotion, Apply  topically to the appropriate area as directed 2 (Two) Times a Day., Disp: 60 mL, Rfl: 0  •  chlorthalidone (HYGROTON) 25 MG tablet, Take 0.5 tablets by mouth Daily., Disp: 30 tablet, Rfl: 1  •  fluticasone (FLONASE) 50 MCG/ACT nasal spray, 1 spray into the nostril(s) as directed by provider Daily., Disp: 9.9 mL, Rfl: 5  •  nicotine (NICODERM CQ) 21 MG/24HR patch, Place 1 patch on the skin as directed by provider Daily., Disp: 28 each, Rfl: 2  •  omeprazole (priLOSEC) 40 MG capsule, Take 1 capsule by mouth Every Morning Before Breakfast. On an empty stomach for acid reflux., Disp: 40 capsule, Rfl: 5  •  predniSONE (DELTASONE) 10 MG (48) tablet pack, Start with 20 mg daily and taper per package instructions, Disp: 48 each, Rfl: 1  •  predniSONE (DELTASONE) 20 MG tablet, Take 20 mg by mouth Daily., Disp: , Rfl:     Allergies:  Patient has no known allergies.    Family Hx:  Family History   Problem Relation Age of Onset   • Diabetes Other    • Heart disease Other    • Hypertension Other         Social History:  Social History     Socioeconomic History   • Marital status:      Spouse name: Not on file   • Number of children: Not on  "file   • Years of education: Not on file   • Highest education level: Not on file   Tobacco Use   • Smoking status: Former Smoker     Packs/day: 1.00     Years: 20.00     Pack years: 20.00     Types: Cigarettes   • Smokeless tobacco: Never Used   Substance and Sexual Activity   • Alcohol use: Yes     Comment: SOCIAL   • Drug use: No   • Sexual activity: Defer       Review of Systems  Review of Systems   Constitutional: Negative for activity change, appetite change, fatigue and fever.   HENT: Negative for ear pain and sore throat.    Eyes: Negative for pain and visual disturbance.   Respiratory: Negative for cough and shortness of breath.    Cardiovascular: Negative for chest pain and palpitations.   Gastrointestinal: Negative for abdominal pain and nausea.   Endocrine: Negative for cold intolerance and heat intolerance.   Genitourinary: Negative for difficulty urinating and dysuria.   Musculoskeletal: Negative for arthralgias and gait problem.   Skin: Positive for rash. Negative for color change.   Neurological: Positive for headaches. Negative for dizziness and weakness.   Hematological: Negative for adenopathy. Does not bruise/bleed easily.   Psychiatric/Behavioral: Negative for agitation, confusion and sleep disturbance.       Objective:     /92   Ht 177.8 cm (70\")   Wt 91.6 kg (202 lb)   SpO2 98%   BMI 28.98 kg/m²     Physical Exam   Constitutional: He is oriented to person, place, and time. He appears well-developed and well-nourished.   HENT:   Head: Normocephalic and atraumatic.   Eyes: Conjunctivae, EOM and lids are normal. Pupils are equal, round, and reactive to light.   Neck: Normal range of motion. Neck supple.   Cardiovascular: Normal rate, regular rhythm and normal heart sounds. Exam reveals no gallop and no friction rub.   No murmur heard.  Pulmonary/Chest: Effort normal and breath sounds normal.   Abdominal: Soft. Normal appearance and bowel sounds are normal. There is no tenderness. "   Musculoskeletal: Normal range of motion.   Neurological: He is alert and oriented to person, place, and time.   Skin: Skin is warm, dry and intact. Capillary refill takes less than 2 seconds.   Multiple areas on right arm and right leg of sarcoid annular areas.   Psychiatric: He has a normal mood and affect. His speech is normal and behavior is normal. Judgment and thought content normal. Cognition and memory are normal.       Assessment/Plan:     Nitin was seen today for headache and results.    Diagnoses and all orders for this visit:    Essential hypertension  -     chlorthalidone (HYGROTON) 25 MG tablet; Take 0.5 tablets by mouth Daily.    Cutaneous sarcoidosis  -     predniSONE (DELTASONE) 10 MG (48) tablet pack; Start with 20 mg daily and taper per package instructions  -     Ambulatory Referral to Pulmonology    SHE (obstructive sleep apnea)  -     Ambulatory Referral to Sleep Medicine    Cigarette smoker  -     nicotine (NICODERM CQ) 21 MG/24HR patch; Place 1 patch on the skin as directed by provider Daily.    Acute intractable tension-type headache       I emphasized with the patient the importance of increasing his daily water intake to 6 to 8 glasses of water per day.  I recommended patient use heat on the muscle tension on his neck and shoulder, as well as massage.  I recommended patient have follow-up with sleep medicine for treatment of his sleep apnea which may be contributing to his headaches.  Advised patient to use over-the-counter ibuprofen, Tylenol, or Excedrin for headache treatment but not to exceed use more than 15 days out of a month to avoid rebound headaches.    Will provide patient with referral to see Dr. Molina as it may have been 2 years since he last was seen by pulmonology for follow-up considering changes on chest x-ray from last imaging and diagnosis of sarcoidosis.    Follow-up:     Return in about 4 weeks (around 7/24/2019) for Recheck.      Goals        Patient Stated    •  Sarcoidosis Management (pt-stated)      Barrier to goal:   1. Lack of health insurance.  2. Lost to follow up.            Preventative:    Vaccines Recommended at this visit:   TDaP/TD    Screenings Recommended at this visit:  No Screenings offered today. Patient is up to date on all screenings at this time.     Smoking Status:  Patient is current smoker. Patient is interested in smoking cessation. Smoking cessation counseling was provided. 3-5 of face to face time was spent counseling the patient. Pharmacotherapy was prescribed as ordered.    Alcohol Intake:  Regular (moderate)    Patient's Body mass index is 28.98 kg/m². BMI is above normal parameters. Recommendations include: educational material, exercise counseling and nutrition counseling.        RISK SCORE: 4      Signature:  Delaney Buchanan MD [facility] PGY3  [place] [address]  Office: [phone]          This document has been electronically signed by Delaney Buchanan MD on June 26, 2019 4:26 PM

## 2019-08-06 ENCOUNTER — CONSULT (OUTPATIENT)
Dept: SLEEP MEDICINE | Facility: HOSPITAL | Age: 39
End: 2019-08-06

## 2019-08-06 VITALS
SYSTOLIC BLOOD PRESSURE: 137 MMHG | DIASTOLIC BLOOD PRESSURE: 89 MMHG | OXYGEN SATURATION: 97 % | BODY MASS INDEX: 28.2 KG/M2 | WEIGHT: 197 LBS | HEIGHT: 70 IN | HEART RATE: 68 BPM

## 2019-08-06 DIAGNOSIS — G47.33 OBSTRUCTIVE SLEEP APNEA, ADULT: Primary | ICD-10-CM

## 2019-08-06 PROCEDURE — 99213 OFFICE O/P EST LOW 20 MIN: CPT | Performed by: INTERNAL MEDICINE

## 2019-08-06 NOTE — PROGRESS NOTES
"New Patient Sleep Medicine Consultation    Encounter Date: 8/6/2019         Patient's PCP: Hero Hernandez MD  Referring provider: Delaney Buchanan  Reason for consultation chief complaint: known SHE not on therapy.    Nitin Jackson is a 39 y.o. male who had PSG on 06/25/2015 - AHI of 12.4, and was last seen in this clinic on April 22, 2016 presents for reevaluation of obstructive sleep apnea.  The patient was stable on therapy however his machine started to malfunction and he stopped using.  His symptoms of excessive daytime sleepiness, nocturia, morning headaches, disturbed and restless sleep have returned.  He admits to snoring, unrestful sleep, High blod pressure, gasping during sleep, stop breathing during sleep, sleeping less than 6 hours per night, Disturbed or restless sleep, choking duing sleep and Up to the bathroom at night. He denies cataplexy, sleep paralysis, or hypnagogic hallucinations. His bedtime is ~ 2200. He  falls asleep after 10-15 minutes, and is up 3-4 times per night. He wakes up ~ 1100. He endorses 4-5 hours of sleep. He drinks 0 cups of coffee, 0 teas, and 3-4 sodas per day. He drinks 1-2 alcoholic beverages per week. He is not a current smoker. He does not take sedatives or hypnotics. He has no sleepiness with driving. He naps  When unstimulated.     [place] - 12    Past Medical History:   Diagnosis Date   • Allergic rhinitis    • Asthma    • Chronic dermatitis    • Chronic right shoulder pain    • Cigarette smoker 8/24/2016   • Cutaneous sarcoidosis 8/24/2016   • DJD (degenerative joint disease)     shoulder region, right side   • Dyspnea on exertion    • Elevated blood pressure    • Encounter for routine adult health examination    • Fatigue    • Hypertension     \"NOT TAKING MEDS THAT WERE PRESCRIBED\"   • Obstructive sleep apnea    • Obstructive sleep apnea syndrome 8/24/2016   • Pain in left knee    • Vertigo      Social History     Socioeconomic History   • Marital status: "      Spouse name: Not on file   • Number of children: Not on file   • Years of education: Not on file   • Highest education level: Not on file   Tobacco Use   • Smoking status: Former Smoker     Packs/day: 1.00     Years: 20.00     Pack years: 20.00     Types: Cigarettes   • Smokeless tobacco: Never Used   Substance and Sexual Activity   • Alcohol use: Yes     Comment: SOCIAL   • Drug use: No   • Sexual activity: Defer     Family History   Problem Relation Age of Onset   • Diabetes Other    • Heart disease Other    • Hypertension Other    , 4 kids  alvarez  2 brothers and 2 sisters  Other family history + for: none listed  Smoking history: smoked 1 ppds from age 17 until 38  FH of sleep disorders: none listed    Review of Systems:  Constitutional: negative  Eyes: negative  Ears, nose, mouth, throat, and face: negative  Respiratory: negative  Cardiovascular: negative  Gastrointestinal: negative  Genitourinary:negative  Integument/breast: negative  Hematologic/lymphatic: negative  Musculoskeletal:negative  Neurological: negative  Behavioral/Psych: negative  Endocrine: negative  Allergic/Immunologic: negative Patient advised to discuss any positive ROS with PCP.      Vitals:    08/06/19 0910   BP: 137/89   Pulse: 68   SpO2: 97%           08/06/19  0910   Weight: 89.4 kg (197 lb)       Body mass index is 28.27 kg/m². Patient's Body mass index is 28.27 kg/m². BMI is above normal parameters. Recommendations include: referral to primary care.            General: Alert. Cooperative. Well developed. No acute distress.             Head:  Normocephalic. Symmetrical. Atraumatic.              Eyes: Sclera clear. No icterus. PERRLA. Normal EOM.             Ears: No deformities. Normal hearing.             Nose: No septal deviation. No drainage.          Throat: No oral lesions. No thrush. Moist mucous membranes. Trachea midline    Tongue is enlarged    Dentition is poor       Pharynx: Posterior pharyngeal pillars are  narrow    Mallampati score of IV (only hard palate visible)    Pharynx is normal with unrermarkable tonsils   Chest Wall:  Normal shape. Symmetric expansion with respiration. No tenderness.          Lungs:  Clear to auscultation bilaterally. No wheezes. No rhonchi. No rales. Respirations regular, even and unlabored.            Heart:  Regular rhythm and normal rate. Normal S1 and S2. No murmur.     Abdomen:  Soft, non-tender and non-distended. Normal bowel sounds. No masses.  Extremities:  Moves all extremities well. No edema.           Pulses: Pulses palpable and equal bilaterally.               Skin: Dry. Intact. No bleeding. No rash.           Neuro: Moves all 4 extremities and cranial nerves grossly intact.  Psychiatric: Normal mood and affect.      Current Outpatient Medications:   •  albuterol sulfate  (90 Base) MCG/ACT inhaler, Inhale 2 puffs Every 4 (Four) Hours As Needed for Wheezing or Shortness of Air., Disp: 1 inhaler, Rfl: 5  •  beclomethasone (QVAR) 80 MCG/ACT inhaler, Inhale 1 puff 2 (Two) Times a Day., Disp: 1 inhaler, Rfl: 5  •  betamethasone dipropionate (DIPROLENE) 0.05 % lotion, Apply  topically to the appropriate area as directed 2 (Two) Times a Day., Disp: 60 mL, Rfl: 0  •  chlorthalidone (HYGROTON) 25 MG tablet, Take 0.5 tablets by mouth Daily., Disp: 30 tablet, Rfl: 1  •  fluticasone (FLONASE) 50 MCG/ACT nasal spray, 1 spray into the nostril(s) as directed by provider Daily., Disp: 9.9 mL, Rfl: 5  •  nicotine (NICODERM CQ) 21 MG/24HR patch, Place 1 patch on the skin as directed by provider Daily., Disp: 28 each, Rfl: 2  •  omeprazole (priLOSEC) 40 MG capsule, Take 1 capsule by mouth Every Morning Before Breakfast. On an empty stomach for acid reflux., Disp: 40 capsule, Rfl: 5  •  predniSONE (DELTASONE) 10 MG (48) tablet pack, Start with 20 mg daily and taper per package instructions, Disp: 48 each, Rfl: 1  •  predniSONE (DELTASONE) 20 MG tablet, Take 20 mg by mouth Daily., Disp: , Rfl:      WBC   Date Value Ref Range Status   06/03/2019 6.55 3.40 - 10.80 10*3/mm3 Final     RBC   Date Value Ref Range Status   06/03/2019 5.10 4.14 - 5.80 10*6/mm3 Final     Hemoglobin   Date Value Ref Range Status   06/03/2019 15.5 13.0 - 17.7 g/dL Final     Hematocrit   Date Value Ref Range Status   06/03/2019 46.5 37.5 - 51.0 % Final     MCV   Date Value Ref Range Status   06/03/2019 91.2 79.0 - 97.0 fL Final     MCH   Date Value Ref Range Status   06/03/2019 30.4 26.6 - 33.0 pg Final     MCHC   Date Value Ref Range Status   06/03/2019 33.3 31.5 - 35.7 g/dL Final     RDW   Date Value Ref Range Status   06/03/2019 12.6 12.3 - 15.4 % Final     RDW-SD   Date Value Ref Range Status   06/03/2019 41.8 37.0 - 54.0 fl Final     MPV   Date Value Ref Range Status   06/03/2019 12.8 (H) 6.0 - 12.0 fL Final     Platelets   Date Value Ref Range Status   06/03/2019 181 140 - 450 10*3/mm3 Final     Neutrophil %   Date Value Ref Range Status   06/03/2019 39.5 (L) 42.7 - 76.0 % Final     Lymphocyte %   Date Value Ref Range Status   06/03/2019 40.5 19.6 - 45.3 % Final     Monocyte %   Date Value Ref Range Status   06/03/2019 14.8 (H) 5.0 - 12.0 % Final     Eosinophil %   Date Value Ref Range Status   06/03/2019 4.6 0.3 - 6.2 % Final     Basophil %   Date Value Ref Range Status   06/03/2019 0.3 0.0 - 1.5 % Final     Immature Grans %   Date Value Ref Range Status   06/03/2019 0.3 0.0 - 0.5 % Final     Neutrophils, Absolute   Date Value Ref Range Status   06/03/2019 2.59 1.70 - 7.00 10*3/mm3 Final     Lymphocytes, Absolute   Date Value Ref Range Status   06/03/2019 2.65 0.70 - 3.10 10*3/mm3 Final     Monocytes, Absolute   Date Value Ref Range Status   06/03/2019 0.97 (H) 0.10 - 0.90 10*3/mm3 Final     Eosinophils, Absolute   Date Value Ref Range Status   06/03/2019 0.30 0.00 - 0.40 10*3/mm3 Final     Basophils, Absolute   Date Value Ref Range Status   06/03/2019 0.02 0.00 - 0.20 10*3/mm3 Final     Immature Grans, Absolute   Date Value  Ref Range Status   06/03/2019 0.02 0.00 - 0.05 10*3/mm3 Final     nRBC   Date Value Ref Range Status   06/03/2019 0.0 0.0 - 0.2 /100 WBC Final     ASSESSMENT:  1. Obstructive sleep apnea Established, not controlled (2)  1. Check home sleep study   2. Sarcoidosis  1. Continue with pulmonary    RTC in 3 months         This document has been electronically signed by Mitch Romo MD on August 6, 2019         CC: Hero Hernandez MD Malachowski, Jessica Ly*

## 2019-08-28 ENCOUNTER — HOSPITAL ENCOUNTER (OUTPATIENT)
Dept: SLEEP MEDICINE | Facility: HOSPITAL | Age: 39
Discharge: HOME | End: 2019-08-28
Admitting: INTERNAL MEDICINE

## 2019-08-28 ENCOUNTER — OFFICE VISIT (OUTPATIENT)
Dept: PULMONOLOGY | Facility: CLINIC | Age: 39
End: 2019-08-28

## 2019-08-28 VITALS
WEIGHT: 202 LBS | OXYGEN SATURATION: 98 % | BODY MASS INDEX: 28.92 KG/M2 | HEART RATE: 70 BPM | DIASTOLIC BLOOD PRESSURE: 92 MMHG | HEIGHT: 70 IN | SYSTOLIC BLOOD PRESSURE: 135 MMHG

## 2019-08-28 DIAGNOSIS — D86.0 PULMONARY SARCOIDOSIS (HCC): Primary | ICD-10-CM

## 2019-08-28 DIAGNOSIS — F17.210 CIGARETTE NICOTINE DEPENDENCE, UNCOMPLICATED: ICD-10-CM

## 2019-08-28 DIAGNOSIS — G47.33 OBSTRUCTIVE SLEEP APNEA, ADULT: ICD-10-CM

## 2019-08-28 DIAGNOSIS — J45.30 MILD PERSISTENT ASTHMA WITHOUT COMPLICATION: ICD-10-CM

## 2019-08-28 PROCEDURE — 99214 OFFICE O/P EST MOD 30 MIN: CPT | Performed by: INTERNAL MEDICINE

## 2019-08-28 PROCEDURE — 99406 BEHAV CHNG SMOKING 3-10 MIN: CPT | Performed by: INTERNAL MEDICINE

## 2019-08-28 PROCEDURE — 95800 SLP STDY UNATTENDED: CPT

## 2019-08-28 PROCEDURE — 95800 SLP STDY UNATTENDED: CPT | Performed by: INTERNAL MEDICINE

## 2019-08-28 RX ORDER — VARENICLINE TARTRATE 1 MG/1
1 TABLET, FILM COATED ORAL 2 TIMES DAILY
Qty: 60 TABLET | Refills: 1 | Status: SHIPPED | OUTPATIENT
Start: 2019-08-28 | End: 2019-12-12 | Stop reason: SDUPTHER

## 2019-08-28 NOTE — PROGRESS NOTES
Pulmonary Office Follow-up    Subjective     Nitin Jackson is seen today at the office for   Chief Complaint   Patient presents with   • Asthma         HPI  Nitin Jackson is a 39 y.o. male with a PMH significant for tobacco use, cutaneous sarcoid, SHE on CPAP, and hypertension who presents for follow up of asthma and recent axillary lymph node excision.     He was last seen on 9/11/17, at which time he had persistent dyspnea so recommend increasing Qvar to 80 and also referred him to a rheumatologist at [facility] for management of his autoimmune disorder. Pt states he is here for a check up. He reports that he ran out of Qvar a few months ago but he did not notice much difference being off of it.  He reports that he is using his albuterol daily just before exercise but does not feel like he needs it at any other time.  Patient does admit to occasional cough, but denies sputum, chest pain, wheeze, or recent illnesses.  He does continue to smoke around half a pack a day but would like to try Chantix to help him quit.  Patient was also recently seen with Dr. Romo who recommended a home sleep study which she is going to perform [facility].  He does report that he saw a rheumatologist at [facility] after his last visit with me but she did not recommend any interventions.  He did have recent labs, but he has not had an annual eye exam.      Tobacco use history:  Type: cigarettes  Amount: 0.5-1 ppd  Duration: 23 years  Cessation: N/a   Willing to quit: Yes      Patient Active Problem List   Diagnosis   • Cigarette smoker   • Cutaneous sarcoidosis   • SHE (obstructive sleep apnea)   • Essential hypertension, benign   • Allergic rhinitis   • Pulmonary sarcoidosis (CMS/HCC)   • LAD (lymphadenopathy), axillary   • Asthma   • IgG4-related sclerosing disease (CMS/HCC)   • KYLE (dyspnea on exertion)   • HTN (hypertension)   • Multiple pulmonary nodules   • Cigarette nicotine dependence, uncomplicated       Review of  Systems  Review of Systems   Constitutional: Negative for fever and unexpected weight change.   Respiratory: Positive for cough and shortness of breath. Negative for wheezing.    Skin: Positive for rash.     As described in the HPI. Otherwise, remainder of ROS (14 systems) were negative.    Medications, Allergies, Social, and Family Histories reviewed as per EMR.    Objective     Vitals:    08/28/19 1521   BP: 135/92   Pulse: 70   SpO2: 98%     Physical Exam   Constitutional: He is oriented to person, place, and time. Vital signs are normal. He appears well-developed and well-nourished.   HENT:   Head: Normocephalic and atraumatic.   Nose: No mucosal edema.   Mouth/Throat: Uvula is midline, oropharynx is clear and moist and mucous membranes are normal.   Mallampati 4   Eyes: Conjunctivae, EOM and lids are normal.   Neck: Trachea normal and normal range of motion. No tracheal tenderness present. No thyroid mass present.   Cardiovascular: Normal rate, regular rhythm and normal heart sounds. Exam reveals no gallop.   No murmur heard.  Pulmonary/Chest: Effort normal. No respiratory distress. He has no wheezes. He has no rhonchi.   Abdominal: Soft. Normal appearance and bowel sounds are normal. There is no tenderness.   Musculoskeletal:   Normal gait, no extremity edema     Vascular Status -  His right foot exhibits no edema. His left foot exhibits no edema.  Lymphadenopathy:        Head (right side): No submandibular adenopathy present.        Head (left side): No submandibular adenopathy present.     He has no cervical adenopathy.        Right: No supraclavicular adenopathy present.        Left: No supraclavicular adenopathy present.   Neurological: He is alert and oriented to person, place, and time.   Skin: Skin is warm and dry. Rash (annular rash right forearm and knee) noted. No cyanosis. Nails show no clubbing.   Psychiatric: He has a normal mood and affect. His speech is normal and behavior is normal. Judgment  "normal.   Nursing note and vitals reviewed.      IMAGING: HRCT 5/2/17 (independently reviewed and interpreted by me) showed no lymphadenopathy, mildly enlarged left apical interstitial nodule injuring 1 cm, stable left apex interstitial nodule measuring 8 mm, minimal enlargement of the left upper lobe apical small nodular interstitial opacities largest measuring 1.3 cm, new right upper lobe apical interstitial nodule 6.6 mm, mild decrease in size of right upper lobe apical speculated nodule opacity 1.5 cm, minimal enlargement left upper lobe apical posterior interstitial nodular opacities measuring 9 mm, white blood posterior segment peripheral stable patchy opacity 2.2 cm, left upper lobes.  Segment patchy interstitial opacities speedily borders not appreciated changed measuring 2.5 x 2.2 x 1.9 cm.    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  FOCAL NONCASEATING GRANULOMAS, SEE COMMENT.   Electronically signed by Jesus Loving MD on 8/29/2017 at 1520   Comment       [facility] and their report is attached.  [facility] finds occasional clustered noncaseating granulomas in the lymph node compatible with sarcoidosis, although the granulomas are not as numerous or extensive as expected in cases of sarcoidosis.  The increase in IgG4-positive plasma cells within the germinal centers raise the possibility of IgG4 sclerosing/autoimmune disorder.   Gross Description       The container is labeled \"lymph nodes, left axilla\" and has 2 ovoid gray lymph nodes measuring 3 x 2.2 x 1.6 cm and 2 x 1.7 x 1.5 cm.  Together they weigh 6 g.  The cut surfaces are pinkish tan and soft.  The entire specimen is embedded.  1A smaller lymph node; 1B through 1G larger lymph node.   Special Stains       Histochemical stains for fungi (GMS stain) and acid-fast bacilli " (AFB stain) applied to block 1 are negative for organisms.  An immunostain for BCL-2 applied to block 2 has no evidence of staining of germinal centers consistent with follicular hyperplasia of the lymph node.     CXR 6/3/19: Stable bilateral nodular opacities, right upper lobe nodule appears slightly increased in size compared to 2017    Assessment/Plan     Nitin was seen today for asthma.    Diagnoses and all orders for this visit:    Pulmonary sarcoidosis (CMS/HCC)    Mild persistent asthma without complication    Cigarette nicotine dependence, uncomplicated  -     varenicline (CHANTIX YVONNE) 0.5 MG X 11 & 1 MG X 42 tablet; Use as directed on package instructions, try to quit smoking after 1 week  -     varenicline (CHANTIX) 1 MG tablet; Take 1 tablet by mouth 2 (Two) Times a Day.         Discussion/ Recommendations:   I think he is fine to continue on albuterol just prior to exercise, but counseled if he has increased symptoms and albuterol use I would recommend going back on an ICS.  Otherwise, I agree with his plans for smoking cessation and we will see if his insurance will cover Chantix.  I did  him on possible side effects of Chantix as well as recommendations for being a quit date within the first month of use.  With regards to his sarcoid, he would benefit from annual PFTs as well as annual eye exam and serum calcium level.    -PFTs at the next visit (spirometry, lung volumes, and diffusion)  -Use albuterol as needed for dyspnea or wheeze.  Okay to use prior to exercise.  -If albuterol use increases or he has worsening dyspnea, I would recommend going to a daily ICS.  -Encouraged him to get annual eye exam.  -Hold on referral back to rheumatology unless there is a clinical change.  -Nitin Jackson is a current cigarettes user.  He currently smokes 0.5 pack of cigarettes per day for a duration of 25 years. I have educated him on the risk of diseases from using tobacco products such as cancer,  COPD and heart diease. I advised him to quit and he is willing to quit. We have discussed the following method/s for tobacco cessation:  Education Material OTC Cessation Products Prescription Medicaiton.  Together we have set a quit date for 1 month from today.  He will follow up with me in 3 months or sooner to check on his progress. I spent 3  minutes counseling the patient.  -Encouraged him to get annual flu vaccine when available.        Return in about 3 months (around 11/28/2019) for Recheck asthma; arrive 30mins early for PFTs.          This document has been electronically signed by Licha Molina MD on August 28, 2019 4:25 PM      Dictated using Dragon

## 2019-09-03 DIAGNOSIS — G47.33 OBSTRUCTIVE SLEEP APNEA, ADULT: Primary | ICD-10-CM

## 2019-09-10 ENCOUNTER — TELEPHONE (OUTPATIENT)
Dept: SLEEP MEDICINE | Facility: HOSPITAL | Age: 39
End: 2019-09-10

## 2019-09-10 NOTE — TELEPHONE ENCOUNTER
Mr. Jackson did return call today for sleep study results.  Results given and patient voiced understanding.  CPAP machine ordered sent to [facility] per patient choice and follow up appt made for October in the sleep clinic.

## 2019-09-10 NOTE — TELEPHONE ENCOUNTER
Sleep center has been trying to contact Mr. Jackson for the past week to give him the results of his recent sleep study.  His phone is not accepting calls.   Letter mailed to contact sleep clinic for an appt for results.

## 2019-09-11 ENCOUNTER — APPOINTMENT (OUTPATIENT)
Dept: SLEEP MEDICINE | Facility: HOSPITAL | Age: 39
End: 2019-09-11

## 2019-12-05 ENCOUNTER — TELEPHONE (OUTPATIENT)
Dept: PULMONOLOGY | Facility: CLINIC | Age: 39
End: 2019-12-05

## 2019-12-11 NOTE — PROGRESS NOTES
Pulmonary Office Follow-up    Subjective     Nitin Jackson is seen today at the office for   Chief Complaint   Patient presents with   • Asthma   • Sarcoidosis         HPI  Nitin Jackson is a 39 y.o. male with a PMH significant for asthma, tobacco use, cutaneous sarcoid, SHE on CPAP, and hypertension who presents for follow up of asthma.    8/28/19: He was stable on albuterol only as needed so I spent time counseling on the importance of complete tobacco cessation and he was willing to set a quit date for 1 month.  I did recommend PFTs as well as encouraged him to get his annual eye exam given his underlying sarcoidosis.  I also encouraged him to reestablish with rheumatology given his underlying diseases.    12/12/19: He states that his breathing is doing okay but he admits he is not doing very much.  Patient has not needed to use his albuterol recently.  He does complain of nasal congestion despite using Flonase and has difficulty breathing through his nose.  Unfortunately, the patient does continue to smoke but he is cut back to around half a pack a day.  He states that he did not get the Chantix from the pharmacy and request a new prescription.  Patient also complains of difficulty eating as it causes abdominal pain and he has had some reflux at night.  He has not been using the Prilosec that was prescribed to him earlier this year.  Patient also admits he has not gotten his annual eye exam yet.  He states he has not gotten the flu vaccine as he scared will make him sick.      Tobacco use history:  Type: cigarettes  Amount: 0.5-1 ppd  Duration: 23 years  Cessation: N/a   Willing to quit: Yes      Patient Active Problem List   Diagnosis   • Cigarette smoker   • Cutaneous sarcoidosis   • SHE (obstructive sleep apnea)   • Essential hypertension, benign   • Allergic rhinitis   • Pulmonary sarcoidosis (CMS/HCC)   • LAD (lymphadenopathy), axillary   • Asthma   • IgG4-related sclerosing disease (CMS/HCC)   •  KYLE (dyspnea on exertion)   • HTN (hypertension)   • Multiple pulmonary nodules   • Cigarette nicotine dependence, uncomplicated       Review of Systems  Review of Systems   Constitutional: Negative for fever and unexpected weight change.   HENT: Positive for congestion and postnasal drip.    Respiratory: Positive for shortness of breath. Negative for cough and wheezing.    Cardiovascular: Negative for chest pain and leg swelling.   Gastrointestinal: Positive for abdominal pain.        Reflux   Skin: Positive for rash.     As described in the HPI. Otherwise, remainder of ROS (14 systems) were negative.    Medications, Allergies, Social, and Family Histories reviewed as per EMR.    Objective     Vitals:    12/12/19 1455   BP: 138/93   Pulse: 86   SpO2: 98%     Physical Exam   Constitutional: He is oriented to person, place, and time. Vital signs are normal. He appears well-developed and well-nourished.   HENT:   Head: Normocephalic and atraumatic.   Nose: Mucosal edema present. No rhinorrhea.   Mouth/Throat: Uvula is midline, oropharynx is clear and moist and mucous membranes are normal.   Mallampati 4   Eyes: Conjunctivae, EOM and lids are normal.   Neck: Trachea normal and normal range of motion. No tracheal tenderness present. No thyroid mass present.   Cardiovascular: Normal rate, regular rhythm and normal heart sounds. Exam reveals no gallop.   No murmur heard.  Pulmonary/Chest: Effort normal. No respiratory distress. He has no wheezes. He has no rhonchi.   Abdominal: Soft. Normal appearance and bowel sounds are normal. There is no tenderness.   Musculoskeletal:   Normal gait, no extremity edema     Vascular Status -  His right foot exhibits no edema. His left foot exhibits no edema.  Lymphadenopathy:        Head (right side): No submandibular adenopathy present.        Head (left side): No submandibular adenopathy present.     He has no cervical adenopathy.        Right: No supraclavicular adenopathy present.         Left: No supraclavicular adenopathy present.   Neurological: He is alert and oriented to person, place, and time.   Skin: Skin is warm and dry. No cyanosis. Nails show no clubbing.   Psychiatric: He has a normal mood and affect. His speech is normal and behavior is normal. Judgment normal.   Nursing note and vitals reviewed.    PFTs: 12/12/19 (independently reviewed and interpreted by me)  Ratio 79  FVC 3.77 (2.7)/ 86%  FEV1 2.96 (2.7)/ 83%  TLC 4.84 (6.26)/ 69%  DLCO 19.21 (18.29)/ 59%  Normal spirometry but reduced TLC suggesting early restriction.  No significant bronchodilator response.  Moderately reduced diffusing capacity.  Significant improvement in FVC, but decrease in TLC compared to 4/26/2017.      IMAGING: HRCT 5/2/17 (independently reviewed and interpreted by me) showed no lymphadenopathy, mildly enlarged left apical interstitial nodule injuring 1 cm, stable left apex interstitial nodule measuring 8 mm, minimal enlargement of the left upper lobe apical small nodular interstitial opacities largest measuring 1.3 cm, new right upper lobe apical interstitial nodule 6.6 mm, mild decrease in size of right upper lobe apical speculated nodule opacity 1.5 cm, minimal enlargement left upper lobe apical posterior interstitial nodular opacities measuring 9 mm, white blood posterior segment peripheral stable patchy opacity 2.2 cm, left upper lobes.  Segment patchy interstitial opacities speedily borders not appreciated changed measuring 2.5 x 2.2 x 1.9 cm.    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  FOCAL NONCASEATING GRANULOMAS, SEE COMMENT.   Electronically signed by Jesus Loving MD on 8/29/2017 at 1520   Comment       The case is submitted to the [facility] for evaluation and their report is attached.  The [facility] finds occasional clustered noncaseating granulomas in the  "lymph node compatible with sarcoidosis, although the granulomas are not as numerous or extensive as expected in cases of sarcoidosis.  The increase in IgG4-positive plasma cells within the germinal centers raise the possibility of IgG4 sclerosing/autoimmune disorder.   Gross Description       The container is labeled \"lymph nodes, left axilla\" and has 2 ovoid gray lymph nodes measuring 3 x 2.2 x 1.6 cm and 2 x 1.7 x 1.5 cm.  Together they weigh 6 g.  The cut surfaces are pinkish tan and soft.  The entire specimen is embedded.  1A smaller lymph node; 1B through 1G larger lymph node.   Special Stains       Histochemical stains for fungi (GMS stain) and acid-fast bacilli (AFB stain) applied to block 1 are negative for organisms.  An immunostain for BCL-2 applied to block 2 has no evidence of staining of germinal centers consistent with follicular hyperplasia of the lymph node.     CXR 6/3/19: Stable bilateral nodular opacities, right upper lobe nodule appears slightly increased in size compared to 2017    HST 8/29/19:      Assessment/Plan     Nitin was seen today for asthma and sarcoidosis.    Diagnoses and all orders for this visit:    Mild persistent asthma without complication    Pulmonary sarcoidosis (CMS/HCC)  -     Ambulatory Referral to Ophthalmology    SHE (obstructive sleep apnea)    Seasonal allergic rhinitis, unspecified trigger  -     montelukast (SINGULAIR) 10 MG tablet; Take 1 tablet by mouth Every Night.    Cigarette nicotine dependence, uncomplicated  -     varenicline (CHANTIX YVONNE) 0.5 MG X 11 & 1 MG X 42 tablet; Use as directed on package instructions, try to quit smoking after 1 week  -     varenicline (CHANTIX) 1 MG tablet; Take 1 tablet by mouth 2 (Two) Times a Day.    GERD with esophagitis         Discussion/ Recommendations:   I think he is stable from a asthma standpoint, but I did  him that if he chooses to start regular exercise, he can consider using his albuterol prior to exercise.  " He is having some persistent rhinitis symptoms and I think you benefit from the initiation of a leukotriene inhibitor in addition to his nasal steroid.  Unfortunately, he does continue to smoke but states he is committed to cessation and requests a refill of Chantix.  I think his abdominal complaints are likely related to some reflux and possibly gastritis.  I recommend that he modify his diet and try an over-the-counter H2 blocker then discuss it with his PCP at his appointment later this month.    -Use albuterol as needed for dyspnea or wheeze.  Okay to use prior to exercise.  -If albuterol use increases or he has worsening dyspnea, I would recommend going to a daily ICS.  -Start montelukast daily.  -Continue Flonase on a daily basis.  -Encouraged him to get annual eye exam.  Referred to Dr. Guerin to establish care.  -Hold on referral back to rheumatology unless there is a clinical change.  -Nitin Jackson is a current cigarettes user.  He currently smokes 0.5 pack of cigarettes per day for a duration of 25 years. I have educated him on the risk of diseases from using tobacco products such as cancer, COPD and heart diease. I advised him to quit and he is willing to quit. We have discussed the following method/s for tobacco cessation:  Prescription Medicaiton.  Together we have set a quit date for 1/1/20.  He will follow up with me in 3 months or sooner to check on his progress. I spent 3  minutes counseling the patient.  -Strongly encouraged him to get the flu vaccine.  Counseled on risk/benefits of flu vaccination.    Given, counseled patient on annual monitoring with PFTs, eye exam, and calcium level.    Return in about 3 months (around 3/12/2020) for Recheck asthma.          This document has been electronically signed by Licha Molina MD on December 12, 2019 3:35 PM      Dictated using Dragon

## 2019-12-12 ENCOUNTER — OFFICE VISIT (OUTPATIENT)
Dept: PULMONOLOGY | Facility: CLINIC | Age: 39
End: 2019-12-12

## 2019-12-12 ENCOUNTER — PROCEDURE VISIT (OUTPATIENT)
Dept: PULMONOLOGY | Facility: CLINIC | Age: 39
End: 2019-12-12

## 2019-12-12 VITALS
HEART RATE: 86 BPM | SYSTOLIC BLOOD PRESSURE: 138 MMHG | BODY MASS INDEX: 29.31 KG/M2 | DIASTOLIC BLOOD PRESSURE: 93 MMHG | OXYGEN SATURATION: 98 % | WEIGHT: 204.7 LBS | HEIGHT: 70 IN

## 2019-12-12 DIAGNOSIS — J45.30 MILD PERSISTENT ASTHMA WITHOUT COMPLICATION: Primary | ICD-10-CM

## 2019-12-12 DIAGNOSIS — J30.2 SEASONAL ALLERGIC RHINITIS, UNSPECIFIED TRIGGER: ICD-10-CM

## 2019-12-12 DIAGNOSIS — D86.0 PULMONARY SARCOIDOSIS (HCC): Chronic | ICD-10-CM

## 2019-12-12 DIAGNOSIS — G47.33 OSA (OBSTRUCTIVE SLEEP APNEA): ICD-10-CM

## 2019-12-12 DIAGNOSIS — K21.00 GERD WITH ESOPHAGITIS: ICD-10-CM

## 2019-12-12 DIAGNOSIS — F17.210 CIGARETTE NICOTINE DEPENDENCE, UNCOMPLICATED: ICD-10-CM

## 2019-12-12 PROCEDURE — 94060 EVALUATION OF WHEEZING: CPT | Performed by: INTERNAL MEDICINE

## 2019-12-12 PROCEDURE — 99214 OFFICE O/P EST MOD 30 MIN: CPT | Performed by: INTERNAL MEDICINE

## 2019-12-12 PROCEDURE — 94729 DIFFUSING CAPACITY: CPT | Performed by: INTERNAL MEDICINE

## 2019-12-12 PROCEDURE — 94727 GAS DIL/WSHOT DETER LNG VOL: CPT | Performed by: INTERNAL MEDICINE

## 2019-12-12 PROCEDURE — 99406 BEHAV CHNG SMOKING 3-10 MIN: CPT | Performed by: INTERNAL MEDICINE

## 2019-12-12 RX ORDER — MONTELUKAST SODIUM 10 MG/1
10 TABLET ORAL NIGHTLY
Qty: 30 TABLET | Refills: 11 | Status: SHIPPED | OUTPATIENT
Start: 2019-12-12 | End: 2021-03-19 | Stop reason: SDUPTHER

## 2019-12-12 RX ORDER — VARENICLINE TARTRATE 1 MG/1
1 TABLET, FILM COATED ORAL 2 TIMES DAILY
Qty: 60 TABLET | Refills: 1 | OUTPATIENT
Start: 2019-12-12 | End: 2020-05-30

## 2019-12-31 ENCOUNTER — TELEPHONE (OUTPATIENT)
Dept: FAMILY MEDICINE CLINIC | Facility: CLINIC | Age: 39
End: 2019-12-31

## 2020-02-05 ENCOUNTER — OFFICE VISIT (OUTPATIENT)
Dept: FAMILY MEDICINE CLINIC | Facility: CLINIC | Age: 40
End: 2020-02-05

## 2020-02-05 ENCOUNTER — APPOINTMENT (OUTPATIENT)
Dept: LAB | Facility: HOSPITAL | Age: 40
End: 2020-02-05

## 2020-02-05 VITALS
OXYGEN SATURATION: 99 % | WEIGHT: 210.1 LBS | DIASTOLIC BLOOD PRESSURE: 100 MMHG | BODY MASS INDEX: 30.08 KG/M2 | HEIGHT: 70 IN | SYSTOLIC BLOOD PRESSURE: 150 MMHG | TEMPERATURE: 97.5 F | HEART RATE: 73 BPM

## 2020-02-05 DIAGNOSIS — I10 ESSENTIAL HYPERTENSION: ICD-10-CM

## 2020-02-05 DIAGNOSIS — D86.9 SARCOIDOSIS: ICD-10-CM

## 2020-02-05 DIAGNOSIS — G47.33 OSA (OBSTRUCTIVE SLEEP APNEA): Primary | ICD-10-CM

## 2020-02-05 DIAGNOSIS — K21.9 GASTROESOPHAGEAL REFLUX DISEASE, ESOPHAGITIS PRESENCE NOT SPECIFIED: ICD-10-CM

## 2020-02-05 PROCEDURE — 99213 OFFICE O/P EST LOW 20 MIN: CPT | Performed by: STUDENT IN AN ORGANIZED HEALTH CARE EDUCATION/TRAINING PROGRAM

## 2020-02-05 RX ORDER — CHLORTHALIDONE 25 MG/1
12.5 TABLET ORAL DAILY
Qty: 30 TABLET | Refills: 1 | Status: SHIPPED | OUTPATIENT
Start: 2020-02-05 | End: 2021-03-19

## 2020-02-05 RX ORDER — PREDNISONE 10 MG
TABLET, DOSE PACK ORAL DAILY
Qty: 48 EACH | Refills: 0 | Status: SHIPPED | OUTPATIENT
Start: 2020-02-05 | End: 2020-06-04

## 2020-02-05 RX ORDER — PANTOPRAZOLE SODIUM 40 MG/1
40 TABLET, DELAYED RELEASE ORAL DAILY
Qty: 30 TABLET | Refills: 0 | Status: SHIPPED | OUTPATIENT
Start: 2020-02-05 | End: 2020-03-06

## 2020-02-10 NOTE — PROGRESS NOTES
"  Family Medicine Residency  Hero Hernandez MD    Subjective:     Nitin Jackson is a 39 y.o. male who presents for acid reflux.  He feels a burning sensation most days in his upper abdomen.  He has not noted any nighttime or morning cough.  Spicy and greasy foods make it worse.  This point he has found no effective relieving factors.  He has tried over-the-counter medications without relief.  Patient denies any difficulty swallowing, any weight loss, chest pain or any radiation.  Patient does smoke and drinks socially.    Patient has history of sarcoidosis.  He follows Dr. Molina for any pulmonary complications of his disease.  Patient is currently being bothered by some skin rash that is itchy.  Patient has previously been treated with steroids effectively for his sarcoidosis.    Patient feels like his sleep is worse.  He has noted worsening snoring.  Patient has a history of SHE.  He has a CPAP that he uses at home occasionally.  Patient requested referral to ENT to see if there is any surgical procedure that can be done to help.  Patient feels tired throughout the day and does not feel rested when he wakes up.    The following portions of the patient's history were reviewed and updated as appropriate: allergies, current medications, past family history, past medical history, past social history, past surgical history and problem list.    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   • DJD (degenerative joint disease)     shoulder region, right side   • Dyspnea on exertion    • Elevated blood pressure    • Encounter for routine adult health examination    • Fatigue    • Hypertension     \"NOT TAKING MEDS THAT WERE PRESCRIBED\"   • Obstructive sleep apnea    • Obstructive sleep apnea syndrome 8/24/2016   • Pain in left knee    • Vertigo        "       Past Surgical Hx:  Past Surgical History:   Procedure Laterality Date   • BREAST LUMPECTOMY WITH AXILLARY NODE DISSECTION Left 8/22/2017    Procedure: LEFT AXILLARY LYMPH NODE BIOPSYl;  Surgeon: Bryan Arnold MD;  Location: [facility];  Service:    • INJECTION OF MEDICATION  08/10/2016    Kenalog   • SHOULDER ARTHROSCOPY  01/13/2015    Arthroscopy of shoulder, subacromial decompression, Reed, and injection of the shoulder with 80 ml Kenalog   • SHOULDER SURGERY         Current Meds:    Current Outpatient Medications:   •  albuterol sulfate  (90 Base) MCG/ACT inhaler, Inhale 2 puffs Every 4 (Four) Hours As Needed for Wheezing or Shortness of Air., Disp: 1 inhaler, Rfl: 5  •  betamethasone dipropionate (DIPROLENE) 0.05 % lotion, Apply  topically to the appropriate area as directed 2 (Two) Times a Day., Disp: 60 mL, Rfl: 0  •  chlorthalidone (HYGROTON) 25 MG tablet, Take 0.5 tablets by mouth Daily., Disp: 30 tablet, Rfl: 1  •  fluticasone (FLONASE) 50 MCG/ACT nasal spray, 1 spray into the nostril(s) as directed by provider Daily., Disp: 9.9 mL, Rfl: 5  •  montelukast (SINGULAIR) 10 MG tablet, Take 1 tablet by mouth Every Night., Disp: 30 tablet, Rfl: 11  •  pantoprazole (PROTONIX) 40 MG EC tablet, Take 1 tablet by mouth Daily for 30 days., Disp: 30 tablet, Rfl: 0  •  PredniSONE (DELTASONE) 10 MG tablet pack, Take  by mouth Daily., Disp: 48 each, Rfl: 0  •  varenicline (CHANTIX YVONNE) 0.5 MG X 11 & 1 MG X 42 tablet, Use as directed on package instructions, try to quit smoking after 1 week, Disp: 53 tablet, Rfl: 0  •  varenicline (CHANTIX) 1 MG tablet, Take 1 tablet by mouth 2 (Two) Times a Day., Disp: 60 tablet, Rfl: 1    Allergies:  No Known Allergies    Family Hx:  Family History   Problem Relation Age of Onset   • Diabetes Other    • Heart disease Other    • Hypertension Other         Social History:  Social History     Socioeconomic History   • Marital status:      Spouse name: Not on file   •  "Number of children: Not on file   • Years of education: Not on file   • Highest education level: Not on file   Tobacco Use   • Smoking status: Current Every Day Smoker     Packs/day: 0.50     Years: 20.00     Pack years: 10.00     Types: Cigarettes   • Smokeless tobacco: Never Used   Substance and Sexual Activity   • Alcohol use: Yes     Comment: SOCIAL   • Drug use: No   • Sexual activity: Defer       Review of Systems  Review of Systems   Constitutional: Positive for fatigue. Negative for chills and fever.   HENT: Negative for ear pain, hearing loss, postnasal drip and sore throat.    Eyes: Negative for photophobia and pain.   Respiratory: Negative for cough, shortness of breath and wheezing.    Cardiovascular: Negative for chest pain, palpitations and leg swelling.   Gastrointestinal: Positive for abdominal pain. Negative for blood in stool, diarrhea, nausea and vomiting.   Genitourinary: Negative for difficulty urinating and dysuria.   Musculoskeletal: Negative for arthralgias and myalgias.   Skin: Positive for rash. Negative for pallor and wound.   Neurological: Negative for syncope, weakness, numbness and headaches.   Psychiatric/Behavioral: Positive for sleep disturbance. Negative for agitation, dysphoric mood and suicidal ideas. The patient is not nervous/anxious.        Objective:     /100 (BP Location: Right arm, Patient Position: Sitting)   Pulse 73   Temp 97.5 °F (36.4 °C) (Tympanic)   Ht 177.8 cm (70\")   Wt 95.3 kg (210 lb 1.6 oz)   SpO2 99%   BMI 30.15 kg/m²   Physical Exam   Constitutional: He is oriented to person, place, and time. He appears well-nourished. No distress.   HENT:   Head: Normocephalic and atraumatic.   Mouth/Throat: No oropharyngeal exudate.   Eyes: Pupils are equal, round, and reactive to light. Conjunctivae are normal. No scleral icterus.   Cardiovascular: Normal rate, regular rhythm and normal heart sounds.   Pulmonary/Chest: Effort normal and breath sounds normal. He " has no wheezes. He has no rales.   Abdominal: Soft. Bowel sounds are normal. He exhibits no distension. There is no hepatosplenomegaly. There is tenderness in the epigastric area. There is no rigidity, no CVA tenderness and negative Montemayor's sign.   Musculoskeletal: He exhibits no edema or deformity.   Neurological: He is alert and oriented to person, place, and time.   Skin: Skin is warm and dry. Rash noted. No purpura noted. Rash is maculopapular. Rash is not nodular and not pustular.   Vitals reviewed.       Assessment/Plan:     Nitin was seen today for stomach ulcers and heartburn.    Diagnoses and all orders for this visit:    SHE (obstructive sleep apnea)  -     Ambulatory Referral to ENT (Otolaryngology)    Gastroesophageal reflux disease, esophagitis presence not specified  -     Ambulatory Referral to Gastroenterology  -     pantoprazole (PROTONIX) 40 MG EC tablet; Take 1 tablet by mouth Daily for 30 days.  -     H. Pylori Breath Test - Breath, Lung; Future    Essential hypertension  -     chlorthalidone (HYGROTON) 25 MG tablet; Take 0.5 tablets by mouth Daily.    Sarcoidosis  -     PredniSONE (DELTASONE) 10 MG tablet pack; Take  by mouth Daily.        · Rx changes: Added pantoprazole and restarted chlorthalidone  · Patient Education: Diet for GERD  · Compliance at present is estimated to be fair.   · Efforts to improve compliance (if necessary) will be directed at decrease tobacco and alcohol use.     Follow-up:     Return in about 4 weeks (around 3/4/2020).    Preventative:  Health Maintenance   Topic Date Due   • ANNUAL PHYSICAL  07/03/1981   • TDAP/TD VACCINES (1 - Tdap) 07/03/1989   • PNEUMOCOCCAL VACCINE (19-64 MEDIUM RISK) (1 of 1 - PPSV23) 07/03/1997   • INFLUENZA VACCINE  08/01/2019     Male Preventative: Exercises regularly  Recommended: Influenza  Vaccine Counseling: patient declined    Weight  -Class: Obese Class I: 30-34.9kg/m2  -Patient's Body mass index is 30.15 kg/m². BMI is above normal  parameters. Recommendations include: educational material and exercise counseling.   eat more fruits and vegetables, decrease soda or juice intake and increase water intake    Alcohol use:  reports that he drinks alcohol.  Nicotine status  reports that he has been smoking cigarettes. He has a 10.00 pack-year smoking history. He has never used smokeless tobacco.    Goals     • Sarcoidosis Management (pt-stated)      Barrier to goal:   1. Lack of health insurance.  2. Lost to follow up.            RISK SCORE: 2      This document has been electronically signed by Hero Hernandez MD on February 10, 2020 9:05 AM

## 2020-02-10 NOTE — PROGRESS NOTES
I have seen the patient.  I have reviewed the notes, assessments, and/or procedures performed by Hero Hernandez MD, I concur with her/his documentation and assessment and plan for Nitin Arnold Manuel.               This document has been electronically signed by Alexis Stearns MD on February 10, 2020 10:54 AM

## 2020-03-26 ENCOUNTER — TELEPHONE (OUTPATIENT)
Dept: FAMILY MEDICINE CLINIC | Facility: CLINIC | Age: 40
End: 2020-03-26

## 2020-03-26 NOTE — TELEPHONE ENCOUNTER
TRIED TO CALL PATIENT AND COULDN'T REACH PATIENT TO RESCHEDULE APPT ON 04/02 BUT LEFT VOICEMAIL.      THANK YOU,      BENNIE

## 2020-03-30 ENCOUNTER — TELEPHONE (OUTPATIENT)
Dept: FAMILY MEDICINE CLINIC | Facility: CLINIC | Age: 40
End: 2020-03-30

## 2020-03-30 NOTE — TELEPHONE ENCOUNTER
Patient rescheduled his apt 04/02/20 with dr mittal due to COVID-19    He needs a refill for pantoprazole (PROTONIX) 40 MG EC tablet     Sent to [facility] pharmacy     Thank you

## 2020-04-01 DIAGNOSIS — K21.9 GASTROESOPHAGEAL REFLUX DISEASE, ESOPHAGITIS PRESENCE NOT SPECIFIED: Primary | ICD-10-CM

## 2020-04-01 RX ORDER — PANTOPRAZOLE SODIUM 20 MG/1
20 TABLET, DELAYED RELEASE ORAL DAILY
Qty: 30 TABLET | Refills: 0 | Status: SHIPPED | OUTPATIENT
Start: 2020-04-01 | End: 2020-05-06

## 2020-05-06 DIAGNOSIS — K21.9 GASTROESOPHAGEAL REFLUX DISEASE, ESOPHAGITIS PRESENCE NOT SPECIFIED: ICD-10-CM

## 2020-05-06 RX ORDER — PANTOPRAZOLE SODIUM 20 MG/1
20 TABLET, DELAYED RELEASE ORAL DAILY
Qty: 30 TABLET | Refills: 0 | Status: SHIPPED | OUTPATIENT
Start: 2020-05-06 | End: 2020-06-04 | Stop reason: SDUPTHER

## 2020-05-30 ENCOUNTER — APPOINTMENT (OUTPATIENT)
Dept: GENERAL RADIOLOGY | Facility: HOSPITAL | Age: 40
End: 2020-05-30

## 2020-05-30 ENCOUNTER — HOSPITAL ENCOUNTER (EMERGENCY)
Facility: HOSPITAL | Age: 40
Discharge: HOME | End: 2020-05-30
Attending: EMERGENCY MEDICINE | Admitting: EMERGENCY MEDICINE

## 2020-05-30 ENCOUNTER — APPOINTMENT (OUTPATIENT)
Dept: CT IMAGING | Facility: HOSPITAL | Age: 40
End: 2020-05-30

## 2020-05-30 VITALS
RESPIRATION RATE: 18 BRPM | TEMPERATURE: 99.1 F | OXYGEN SATURATION: 99 % | DIASTOLIC BLOOD PRESSURE: 98 MMHG | SYSTOLIC BLOOD PRESSURE: 136 MMHG | BODY MASS INDEX: 29.31 KG/M2 | HEIGHT: 70 IN | HEART RATE: 76 BPM | WEIGHT: 204.7 LBS

## 2020-05-30 DIAGNOSIS — I10 ESSENTIAL HYPERTENSION: ICD-10-CM

## 2020-05-30 DIAGNOSIS — R07.9 CHEST PAIN, UNSPECIFIED TYPE: Primary | ICD-10-CM

## 2020-05-30 LAB
ALBUMIN SERPL-MCNC: 4.3 G/DL (ref 3.5–5.2)
ALBUMIN/GLOB SERPL: 1.5 G/DL
ALP SERPL-CCNC: 88 U/L (ref 39–117)
ALT SERPL W P-5'-P-CCNC: 23 U/L (ref 1–41)
AMPHET+METHAMPHET UR QL: NEGATIVE
AMPHETAMINES UR QL: NEGATIVE
ANION GAP SERPL CALCULATED.3IONS-SCNC: 7 MMOL/L (ref 5–15)
AST SERPL-CCNC: 25 U/L (ref 1–40)
BARBITURATES UR QL SCN: NEGATIVE
BASOPHILS # BLD AUTO: 0.02 10*3/MM3 (ref 0–0.2)
BASOPHILS NFR BLD AUTO: 0.3 % (ref 0–1.5)
BENZODIAZ UR QL SCN: NEGATIVE
BILIRUB SERPL-MCNC: 0.5 MG/DL (ref 0.2–1.2)
BILIRUB UR QL STRIP: NEGATIVE
BUN BLD-MCNC: 14 MG/DL (ref 6–20)
BUN/CREAT SERPL: 13.2 (ref 7–25)
BUPRENORPHINE SERPL-MCNC: NEGATIVE NG/ML
CALCIUM SPEC-SCNC: 8.8 MG/DL (ref 8.6–10.5)
CANNABINOIDS SERPL QL: NEGATIVE
CHLORIDE SERPL-SCNC: 105 MMOL/L (ref 98–107)
CLARITY UR: CLEAR
CO2 SERPL-SCNC: 23 MMOL/L (ref 22–29)
COCAINE UR QL: NEGATIVE
COLOR UR: YELLOW
CREAT BLD-MCNC: 1.06 MG/DL (ref 0.76–1.27)
D-DIMER, QUANTITATIVE (MAD,POW, STR): <270 NG/ML (FEU) (ref 0–470)
DEPRECATED RDW RBC AUTO: 43.8 FL (ref 37–54)
EOSINOPHIL # BLD AUTO: 0.21 10*3/MM3 (ref 0–0.4)
EOSINOPHIL NFR BLD AUTO: 3.3 % (ref 0.3–6.2)
ERYTHROCYTE [DISTWIDTH] IN BLOOD BY AUTOMATED COUNT: 13.3 % (ref 12.3–15.4)
GFR SERPL CREATININE-BSD FRML MDRD: 93 ML/MIN/1.73
GLOBULIN UR ELPH-MCNC: 2.9 GM/DL
GLUCOSE BLD-MCNC: 94 MG/DL (ref 65–99)
GLUCOSE UR STRIP-MCNC: NEGATIVE MG/DL
HCT VFR BLD AUTO: 45.1 % (ref 37.5–51)
HGB BLD-MCNC: 15.5 G/DL (ref 13–17.7)
HGB UR QL STRIP.AUTO: NEGATIVE
IMM GRANULOCYTES # BLD AUTO: 0.02 10*3/MM3 (ref 0–0.05)
IMM GRANULOCYTES NFR BLD AUTO: 0.3 % (ref 0–0.5)
KETONES UR QL STRIP: NEGATIVE
LEUKOCYTE ESTERASE UR QL STRIP.AUTO: NEGATIVE
LYMPHOCYTES # BLD AUTO: 2.49 10*3/MM3 (ref 0.7–3.1)
LYMPHOCYTES NFR BLD AUTO: 39.2 % (ref 19.6–45.3)
MCH RBC QN AUTO: 30.9 PG (ref 26.6–33)
MCHC RBC AUTO-ENTMCNC: 34.4 G/DL (ref 31.5–35.7)
MCV RBC AUTO: 90 FL (ref 79–97)
METHADONE UR QL SCN: NEGATIVE
MONOCYTES # BLD AUTO: 0.9 10*3/MM3 (ref 0.1–0.9)
MONOCYTES NFR BLD AUTO: 14.2 % (ref 5–12)
NEUTROPHILS # BLD AUTO: 2.71 10*3/MM3 (ref 1.7–7)
NEUTROPHILS NFR BLD AUTO: 42.7 % (ref 42.7–76)
NITRITE UR QL STRIP: NEGATIVE
NRBC BLD AUTO-RTO: 0 /100 WBC (ref 0–0.2)
NT-PROBNP SERPL-MCNC: 15 PG/ML (ref 5–450)
OPIATES UR QL: NEGATIVE
OXYCODONE UR QL SCN: NEGATIVE
PCP UR QL SCN: NEGATIVE
PH UR STRIP.AUTO: 7 [PH] (ref 5–9)
PLATELET # BLD AUTO: 170 10*3/MM3 (ref 140–450)
PMV BLD AUTO: 11.7 FL (ref 6–12)
POTASSIUM BLD-SCNC: 4 MMOL/L (ref 3.5–5.2)
PROPOXYPH UR QL: NEGATIVE
PROT SERPL-MCNC: 7.2 G/DL (ref 6–8.5)
PROT UR QL STRIP: NEGATIVE
RBC # BLD AUTO: 5.01 10*6/MM3 (ref 4.14–5.8)
SODIUM BLD-SCNC: 135 MMOL/L (ref 136–145)
SP GR UR STRIP: 1.02 (ref 1–1.03)
TRICYCLICS UR QL SCN: NEGATIVE
TROPONIN T SERPL-MCNC: <0.01 NG/ML (ref 0–0.03)
TROPONIN T SERPL-MCNC: <0.01 NG/ML (ref 0–0.03)
UROBILINOGEN UR QL STRIP: NORMAL
WBC NRBC COR # BLD: 6.35 10*3/MM3 (ref 3.4–10.8)

## 2020-05-30 PROCEDURE — 85379 FIBRIN DEGRADATION QUANT: CPT | Performed by: NURSE PRACTITIONER

## 2020-05-30 PROCEDURE — 93010 ELECTROCARDIOGRAM REPORT: CPT | Performed by: INTERNAL MEDICINE

## 2020-05-30 PROCEDURE — 99284 EMERGENCY DEPT VISIT MOD MDM: CPT

## 2020-05-30 PROCEDURE — 83880 ASSAY OF NATRIURETIC PEPTIDE: CPT | Performed by: NURSE PRACTITIONER

## 2020-05-30 PROCEDURE — 36415 COLL VENOUS BLD VENIPUNCTURE: CPT

## 2020-05-30 PROCEDURE — 85025 COMPLETE CBC W/AUTO DIFF WBC: CPT | Performed by: NURSE PRACTITIONER

## 2020-05-30 PROCEDURE — 25010000002 IOPAMIDOL 61 % SOLUTION: Performed by: EMERGENCY MEDICINE

## 2020-05-30 PROCEDURE — 80053 COMPREHEN METABOLIC PANEL: CPT | Performed by: NURSE PRACTITIONER

## 2020-05-30 PROCEDURE — 80306 DRUG TEST PRSMV INSTRMNT: CPT | Performed by: NURSE PRACTITIONER

## 2020-05-30 PROCEDURE — 93005 ELECTROCARDIOGRAM TRACING: CPT | Performed by: NURSE PRACTITIONER

## 2020-05-30 PROCEDURE — 71260 CT THORAX DX C+: CPT

## 2020-05-30 PROCEDURE — 71046 X-RAY EXAM CHEST 2 VIEWS: CPT

## 2020-05-30 PROCEDURE — 84484 ASSAY OF TROPONIN QUANT: CPT | Performed by: NURSE PRACTITIONER

## 2020-05-30 PROCEDURE — 81003 URINALYSIS AUTO W/O SCOPE: CPT | Performed by: NURSE PRACTITIONER

## 2020-05-30 RX ORDER — SODIUM CHLORIDE 0.9 % (FLUSH) 0.9 %
10 SYRINGE (ML) INJECTION AS NEEDED
Status: DISCONTINUED | OUTPATIENT
Start: 2020-05-30 | End: 2020-05-30 | Stop reason: HOSPADM

## 2020-05-30 RX ORDER — HYDRALAZINE HYDROCHLORIDE 20 MG/ML
10 INJECTION INTRAMUSCULAR; INTRAVENOUS ONCE
Status: DISCONTINUED | OUTPATIENT
Start: 2020-05-30 | End: 2020-05-30 | Stop reason: HOSPADM

## 2020-05-30 RX ORDER — CLONIDINE HYDROCHLORIDE 0.1 MG/1
0.1 TABLET ORAL ONCE
Status: COMPLETED | OUTPATIENT
Start: 2020-05-30 | End: 2020-05-30

## 2020-05-30 RX ADMIN — IOPAMIDOL 95 ML: 612 INJECTION, SOLUTION INTRAVENOUS at 13:30

## 2020-05-30 RX ADMIN — CLONIDINE HYDROCHLORIDE 0.1 MG: 0.1 TABLET ORAL at 15:44

## 2020-06-01 ENCOUNTER — TELEPHONE (OUTPATIENT)
Dept: FAMILY MEDICINE CLINIC | Facility: CLINIC | Age: 40
End: 2020-06-01

## 2020-06-04 ENCOUNTER — OFFICE VISIT (OUTPATIENT)
Dept: FAMILY MEDICINE CLINIC | Facility: CLINIC | Age: 40
End: 2020-06-04

## 2020-06-04 VITALS
WEIGHT: 199.38 LBS | SYSTOLIC BLOOD PRESSURE: 130 MMHG | HEIGHT: 70 IN | HEART RATE: 83 BPM | DIASTOLIC BLOOD PRESSURE: 86 MMHG | TEMPERATURE: 97.2 F | BODY MASS INDEX: 28.54 KG/M2 | OXYGEN SATURATION: 98 %

## 2020-06-04 DIAGNOSIS — R07.2 PRECORDIAL PAIN: Primary | ICD-10-CM

## 2020-06-04 DIAGNOSIS — F17.200 TOBACCO DEPENDENCE: ICD-10-CM

## 2020-06-04 DIAGNOSIS — D89.89 IGG4-RELATED SCLEROSING DISEASE (HCC): ICD-10-CM

## 2020-06-04 DIAGNOSIS — R91.8 LUNG MASS: ICD-10-CM

## 2020-06-04 DIAGNOSIS — K21.9 GASTROESOPHAGEAL REFLUX DISEASE, ESOPHAGITIS PRESENCE NOT SPECIFIED: ICD-10-CM

## 2020-06-04 PROBLEM — F17.210 CIGARETTE NICOTINE DEPENDENCE, UNCOMPLICATED: Status: RESOLVED | Noted: 2019-08-28 | Resolved: 2020-06-04

## 2020-06-04 PROCEDURE — 99213 OFFICE O/P EST LOW 20 MIN: CPT | Performed by: STUDENT IN AN ORGANIZED HEALTH CARE EDUCATION/TRAINING PROGRAM

## 2020-06-04 RX ORDER — PANTOPRAZOLE SODIUM 20 MG/1
20 TABLET, DELAYED RELEASE ORAL DAILY
Qty: 30 TABLET | Refills: 0 | Status: SHIPPED | OUTPATIENT
Start: 2020-06-04 | End: 2020-07-17 | Stop reason: SDUPTHER

## 2020-06-04 NOTE — PROGRESS NOTES
"  Family Medicine Residency  Horace Schwartz MD    Subjective:     You have chosen to receive care through a telephone visit. Do you consent to use a telephone visit for your medical care today? Yes    Nitin Jackson is a 39 y.o. male who presents for ED follow up from chest pain.    He was seen at  ED on 5/30/2020 and found to not be in ACS and sent home. He has been taking NSAIDs that have helped his pain. His pain is reproducible. He is unsure how he could have \"pulled a muscle\" but believes this is what occurred.     He additionally endorses having sarcoidosis and being lost to follow up with rheumatologist >1 year due to them retiring. In the ED a CT chest was obtained that showed b/l nodular and spiculated opacities with nodules increasing in size. He also follow with pulmonology, however has missed a few appointments due to pandemic.     He is a current everyday smoker. 20 years total, at worst 1PPD, currently 3-5 cigarrettes a day. Is using nicotine TD patches to quit with good response, requests refills.     Denies exertional CP, diaphoresis, SOA, nausea.     The following portions of the patient's history were reviewed and updated as appropriate: allergies, current medications, past family history, past medical history, past social history, past surgical history and problem list.    Past Medical Hx:  Past Medical History:   Diagnosis Date   • Allergic rhinitis    • Asthma    • Chronic dermatitis    • Chronic right shoulder pain    • Cigarette nicotine dependence, uncomplicated 8/28/2019   • Cigarette smoker 8/24/2016   • Cutaneous sarcoidosis 8/24/2016   • DJD (degenerative joint disease)     shoulder region, right side   • Dyspnea on exertion    • Elevated blood pressure    • Encounter for routine adult health examination    • Fatigue    • Hypertension     \"NOT TAKING MEDS THAT WERE PRESCRIBED\"   • Obstructive sleep apnea    • Obstructive sleep apnea syndrome 8/24/2016   • Pain in left knee    "   • Vertigo        Past Surgical Hx:  Past Surgical History:   Procedure Laterality Date   • BREAST LUMPECTOMY WITH AXILLARY NODE DISSECTION Left 8/22/2017    Procedure: LEFT AXILLARY LYMPH NODE BIOPSYl;  Surgeon: Bryan Arnold MD;  Location: [facility];  Service:    • INJECTION OF MEDICATION  08/10/2016    Kenalog   • SHOULDER ARTHROSCOPY  01/13/2015    Arthroscopy of shoulder, subacromial decompression, Reed, and injection of the shoulder with 80 ml Kenalog   • SHOULDER SURGERY         Current Meds:    Current Outpatient Medications:   •  albuterol sulfate  (90 Base) MCG/ACT inhaler, Inhale 2 puffs Every 4 (Four) Hours As Needed for Wheezing or Shortness of Air., Disp: 1 inhaler, Rfl: 5  •  chlorthalidone (HYGROTON) 25 MG tablet, Take 0.5 tablets by mouth Daily., Disp: 30 tablet, Rfl: 1  •  fluticasone (FLONASE) 50 MCG/ACT nasal spray, 1 spray into the nostril(s) as directed by provider Daily., Disp: 9.9 mL, Rfl: 5  •  montelukast (SINGULAIR) 10 MG tablet, Take 1 tablet by mouth Every Night., Disp: 30 tablet, Rfl: 11  •  pantoprazole (PROTONIX) 20 MG EC tablet, Take 1 tablet by mouth Daily., Disp: 30 tablet, Rfl: 0  •  nicotine (NICODERM CQ) 7 MG/24HR patch, Place 1 patch on the skin as directed by provider Daily., Disp: 14 each, Rfl: 5    Allergies:  No Known Allergies    Family Hx:  Family History   Problem Relation Age of Onset   • Diabetes Other    • Heart disease Other    • Hypertension Other         Social History:  Social History     Socioeconomic History   • Marital status:      Spouse name: Not on file   • Number of children: Not on file   • Years of education: Not on file   • Highest education level: Not on file   Tobacco Use   • Smoking status: Current Every Day Smoker     Packs/day: 0.50     Years: 20.00     Pack years: 10.00     Types: Cigarettes   • Smokeless tobacco: Never Used   Substance and Sexual Activity   • Alcohol use: Yes     Comment: SOCIAL   • Drug use: No   • Sexual  "activity: Defer       Review of Systems  Review of Systems   Constitutional: Negative for chills and fatigue.   HENT: Negative for congestion and sore throat.    Eyes: Negative for pain and visual disturbance.   Respiratory: Negative for cough, shortness of breath and wheezing.    Cardiovascular: Positive for chest pain. Negative for palpitations and leg swelling.   Gastrointestinal: Negative for abdominal pain and nausea.   Genitourinary: Negative for dysuria and flank pain.   Musculoskeletal: Negative for back pain and gait problem.   Skin: Negative for pallor and rash.   Neurological: Negative for dizziness, syncope and headaches.   Psychiatric/Behavioral: Negative for behavioral problems and decreased concentration.       Objective:     /86   Pulse 83   Temp 97.2 °F (36.2 °C) (Tympanic)   Ht 177.8 cm (70\")   Wt 90.4 kg (199 lb 6 oz)   SpO2 98%   BMI 28.61 kg/m²   Physical Exam   Constitutional: He is oriented to person, place, and time. He appears well-developed and well-nourished. No distress.   HENT:   Head: Normocephalic and atraumatic.   Right Ear: External ear normal.   Left Ear: External ear normal.   Nose: Nose normal.   Mouth/Throat: Oropharynx is clear and moist.   Eyes: Pupils are equal, round, and reactive to light. EOM are normal.   Neck: Normal range of motion. Neck supple.   Cardiovascular: Normal rate, regular rhythm, normal heart sounds and intact distal pulses.   Pulmonary/Chest: Effort normal and breath sounds normal. He has no wheezes. He has no rales.   Abdominal: Soft. Bowel sounds are normal. There is no tenderness.   Musculoskeletal: Normal range of motion. He exhibits tenderness (tender to palpation of the left precordial area near superior anterior axillary line). He exhibits no edema.   Lymphadenopathy:     He has no cervical adenopathy.   Neurological: He is alert and oriented to person, place, and time.   Skin: Skin is warm. Capillary refill takes less than 2 seconds. He " is not diaphoretic.   Psychiatric: He has a normal mood and affect. His behavior is normal.        Assessment/Plan:     Nitin was seen today for chest pain.    Diagnoses and all orders for this visit:    Precordial pain  Stable, controlled. Has been using NSAIDs with good response, ibuprofen 800 mg. Advised switching to tylenol extra strength to avoid any NSAID associate kidney injury. Advised to call clinic if this does not resolve in another week.     IgG4-related sclerosing disease (CMS/HCC)  Stable? Controlled? Patient lost to follow up. Will refer to rheumatology. CXr and chest CT concerning for progressive disease.  -     Ambulatory Referral to Rheumatology    Lung mass  On chest CT mass increasing in size, spiculated appearance. Has sarcoidosis and current smoker with 20+ pack-year history. Advised him to keep his appointment with Dr. Molina on 6/18/2020 and to follow up regularly with pulmonology.     Tobacco dependence  Discussed smoking cessation for 7 minutes including benefits of quitting, risks of continued use and complication of his sarcoidosis. He is agreeable to quitting. Will send nicotine patches and provided 1800 quit line number.   -     nicotine (NICODERM CQ) 7 MG/24HR patch; Place 1 patch on the skin as directed by provider Daily.    Gastroesophageal reflux disease, esophagitis presence not specified  Stable, controlled. Continue PPI. Discussed dangers of longterm PPI use. May attempt changing to H2 blocker at next visit.  -     pantoprazole (PROTONIX) 20 MG EC tablet; Take 1 tablet by mouth Daily.    · Rx changes: n/a  · Patient Education: smoking cessation, sarcoidosis  · Compliance at present is estimated to be fair.   · Efforts to improve compliance (if necessary) will be directed at increased exercise and quit smoking.     Follow-up:     Return in about 3 months (around 9/4/2020) for Recheck.    Preventative:  Health Maintenance   Topic Date Due   • ANNUAL PHYSICAL  07/03/1981   •  HEPATITIS C SCREENING  01/11/2017   • PNEUMOCOCCAL VACCINE (19-64 MEDIUM RISK) (1 of 1 - PPSV23) 05/27/2021 (Originally 7/3/1997)   • TDAP/TD VACCINES (1 - Tdap) 05/27/2021 (Originally 7/3/1989)   • INFLUENZA VACCINE  08/01/2020     Male Preventative: Hep C and annual at next f/u  Recommended: none  Vaccine Counseling: N/A    Weight  -Class: Overweight: 25.0-29.9kg/m2   -Patient's Body mass index is 28.61 kg/m². BMI is above normal parameters. Recommendations include: exercise counseling and nutrition counseling.   eat more fruits and vegetables, decrease soda or juice intake, increase water intake and increase physical activity    Alcohol use:  reports that he drinks alcohol.  Nicotine status  reports that he has been smoking cigarettes. He has a 10.00 pack-year smoking history. He has never used smokeless tobacco.    Goals        Patient Stated    • Sarcoidosis Management (pt-stated)      Barrier to goal:   1. Lack of health insurance.  2. Lost to follow up.            RISK SCORE: 4      Horace Schwartz M.D. PGY2  [facility] Family Medicine Residency  [address] [place], [place] [id number]  Office: [phone]    This document has been electronically signed by Horace Schwartz MD on June 4, 2020 18:10

## 2020-06-11 NOTE — PROGRESS NOTES
I have seen the patient.  I have reviewed the notes, assessments, and/or procedures performed by *Dr Schwartz** during office visit I concur with her/his documentation and assessment and plan for Nitin Arnold Manuel.              This document has been electronically signed by Miguel Doyle MD on June 11, 2020 13:54

## 2020-06-17 NOTE — PROGRESS NOTES
Pulmonary Office Follow-up    Subjective     Nitin Jackson is seen today at the office for   Chief Complaint   Patient presents with   • Asthma   • Pulmonary sarcoidosis         HPI  Nitin Jackson is a 39 y.o. male with a PMH significant for asthma, tobacco use, cutaneous sarcoid, SHE on CPAP, and hypertension who presents for follow up of asthma.    8/28/19: He was stable on albuterol only as needed so I spent time counseling on the importance of complete tobacco cessation and he was willing to set a quit date for 1 month.  I did recommend PFTs as well as encouraged him to get his annual eye exam given his underlying sarcoidosis.  I also encouraged him to reestablish with rheumatology given his underlying diseases.    12/12/19: He was stable from a COPD standpoint on albuterol as needed but was having some persistent allergies so I recommended starting montelukast on top of Flonase.  I also referred him to Dr. Guerin to establish care for his annual eye exam and spent time counseling him on the importance of complete tobacco cessation.  He set a quit date for 1/1/2020.    6/18/20: He states that he was having chest pain so he went to the ED a few weeks ago.  There are no issues found and he was discharged home.  He states he has not had any issues with his breathing and he has not used his albuterol in a while.  Patient is trying to exercise on regular basis and does not have any issues with his breathing while exercising.  He denies cough, wheeze, or chest pain.  Patient does complain of sinus congestion and nasal congestion.  He continues to use montelukast at night, but he is not using his Flonase.  He is still smoking around half a pack a day but is trying to quit.  He denies any recent antibiotics or steroids.  Patient does complain of daytime fatigue, but admits that he does not wear his CPAP every night.  On average, he only uses it part of the night 3 out of every 7.      Tobacco use  history:  Type: cigarettes  Amount: 0.5-1 ppd  Duration: 23 years  Cessation: N/a   Willing to quit: Yes      Patient Active Problem List   Diagnosis   • Cigarette smoker   • Cutaneous sarcoidosis   • SHE (obstructive sleep apnea)   • Essential hypertension, benign   • Allergic rhinitis   • Pulmonary sarcoidosis (CMS/HCC)   • LAD (lymphadenopathy), axillary   • Asthma   • IgG4-related sclerosing disease (CMS/HCC)   • KYLE (dyspnea on exertion)   • HTN (hypertension)   • Multiple pulmonary nodules   • Tobacco dependence       Review of Systems  Review of Systems   Constitutional: Positive for fatigue. Negative for fever and unexpected weight change.   HENT: Positive for congestion and postnasal drip.    Respiratory: Negative for cough, shortness of breath and wheezing.    Cardiovascular: Negative for chest pain and leg swelling.   Gastrointestinal: Negative for abdominal pain.        Reflux   Skin: Positive for rash.     As described in the HPI. Otherwise, remainder of ROS (14 systems) were negative.    Medications, Allergies, Social, and Family Histories reviewed as per EMR.    Objective     Vitals:    06/18/20 1448   BP: 126/90   Pulse: 93   SpO2: 98%     Physical Exam   Constitutional: He is oriented to person, place, and time. Vital signs are normal. He appears well-developed and well-nourished.   HENT:   Head: Normocephalic and atraumatic.   Nose: Mucosal edema (R>L) present. No rhinorrhea.   Mouth/Throat: Uvula is midline, oropharynx is clear and moist and mucous membranes are normal.   Mallampati 4   Eyes: Conjunctivae, EOM and lids are normal.   Neck: Trachea normal and normal range of motion. No tracheal tenderness present. No thyroid mass present.   Cardiovascular: Normal rate, regular rhythm and normal heart sounds. Exam reveals no gallop.   No murmur heard.  Pulmonary/Chest: Effort normal. No respiratory distress. He has no wheezes. He has no rhonchi.   Abdominal: Soft. Normal appearance and bowel sounds  are normal. There is no tenderness.   Musculoskeletal:   Normal gait, no extremity edema     Vascular Status -  His right foot exhibits no edema. His left foot exhibits no edema.  Lymphadenopathy:        Head (right side): No submandibular adenopathy present.        Head (left side): No submandibular adenopathy present.     He has no cervical adenopathy.        Right: No supraclavicular adenopathy present.        Left: No supraclavicular adenopathy present.   Neurological: He is alert and oriented to person, place, and time.   Skin: Skin is warm and dry. No cyanosis. Nails show no clubbing.   Psychiatric: He has a normal mood and affect. His speech is normal and behavior is normal. Judgment normal.   Nursing note and vitals reviewed.    PFTs: 12/12/19 (independently reviewed and interpreted by me)  Ratio 79  FVC 3.77 (2.7)/ 86%  FEV1 2.96 (2.7)/ 83%  TLC 4.84 (6.26)/ 69%  DLCO 19.21 (18.29)/ 59%  Normal spirometry but reduced TLC suggesting early restriction.  No significant bronchodilator response.  Moderately reduced diffusing capacity.  Significant improvement in FVC, but decrease in TLC compared to 4/26/2017.      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  FOCAL NONCASEATING GRANULOMAS, SEE COMMENT.   Electronically signed by Jesus Loving MD on 8/29/2017 at 1520   Comment       The case is submitted to the [facility] for evaluation and their report is attached.  The [facility] finds occasional clustered noncaseating granulomas in the lymph node compatible with sarcoidosis, although the granulomas are not as  "numerous or extensive as expected in cases of sarcoidosis.  The increase in IgG4-positive plasma cells within the germinal centers raise the possibility of IgG4 sclerosing/autoimmune disorder.   Gross Description       The container is labeled \"lymph nodes, left axilla\" and has 2 ovoid gray lymph nodes measuring 3 x 2.2 x 1.6 cm and 2 x 1.7 x 1.5 cm.  Together they weigh 6 g.  The cut surfaces are pinkish tan and soft.  The entire specimen is embedded.  1A smaller lymph node; 1B through 1G larger lymph node.   Special Stains       Histochemical stains for fungi (GMS stain) and acid-fast bacilli (AFB stain) applied to block 1 are negative for organisms.  An immunostain for BCL-2 applied to block 2 has no evidence of staining of germinal centers consistent with follicular hyperplasia of the lymph node.         Assessment/Plan     Nitin was seen today for asthma and pulmonary sarcoidosis.    Diagnoses and all orders for this visit:    Mild persistent asthma without complication    Pulmonary sarcoidosis (CMS/HCC)  -     Ambulatory Referral to Ophthalmology  -     CT Chest Without Contrast; Future    Seasonal allergic rhinitis, unspecified trigger    SHE (obstructive sleep apnea)    Tobacco dependence         Discussion/ Recommendations:   I did personally review his most recent CT chest which showed some increase in size in the right-sided opacities but no adenopathy.  He is actually doing well from a asthma standpoint so I do not feel that escalation is necessary.  I do think you benefit from using his nasal steroid on regular basis given his persistent nasal congestion.  I think his fatigue is related to his underlying sleep apnea so I counseled him on the importance of compliance with his CPAP.  Otherwise, he does continue to smoke and is not ready to quit yet.    -HRCT at the end of August 2020  -Use albuterol as needed for dyspnea or wheeze.  Okay to use prior to exercise.  -If albuterol use increases or he has " worsening dyspnea, I would recommend going to a daily ICS.  -Continue montelukast nightly  -Advised to start using Flonase again on a daily exam.  -Recommended he use his CPAP on a nightly basis for maximal benefit.  -Encouraged him to get annual eye exam.  Referred to Dr. Guerin again to establish care.  -Hold on referral back to rheumatology unless there is a clinical change.  -Nitin Jackson  reports that he has been smoking cigarettes. He has a 10.00 pack-year smoking history. He has never used smokeless tobacco.. I have educated him on the risk of diseases from using tobacco products such as cancer, COPD and heart diease. I advised him to quit and he is not willing to quit. I spent 1 minutes counseling the patient.  -Strongly encouraged him to get the flu vaccine.        Return in about 3 months (around 9/18/2020) for Recheck asthma, Review results.          This document has been electronically signed by Licha Molina MD on June 18, 2020 15:37      Dictated using Dragon

## 2020-06-18 ENCOUNTER — OFFICE VISIT (OUTPATIENT)
Dept: PULMONOLOGY | Facility: CLINIC | Age: 40
End: 2020-06-18

## 2020-06-18 VITALS
WEIGHT: 209 LBS | OXYGEN SATURATION: 98 % | HEIGHT: 70 IN | SYSTOLIC BLOOD PRESSURE: 126 MMHG | HEART RATE: 93 BPM | BODY MASS INDEX: 29.92 KG/M2 | DIASTOLIC BLOOD PRESSURE: 90 MMHG

## 2020-06-18 DIAGNOSIS — D86.0 PULMONARY SARCOIDOSIS (HCC): Chronic | ICD-10-CM

## 2020-06-18 DIAGNOSIS — G47.33 OSA (OBSTRUCTIVE SLEEP APNEA): ICD-10-CM

## 2020-06-18 DIAGNOSIS — J45.30 MILD PERSISTENT ASTHMA WITHOUT COMPLICATION: Primary | ICD-10-CM

## 2020-06-18 DIAGNOSIS — J30.2 SEASONAL ALLERGIC RHINITIS, UNSPECIFIED TRIGGER: ICD-10-CM

## 2020-06-18 DIAGNOSIS — F17.200 TOBACCO DEPENDENCE: ICD-10-CM

## 2020-06-18 PROCEDURE — 99214 OFFICE O/P EST MOD 30 MIN: CPT | Performed by: INTERNAL MEDICINE

## 2020-07-08 ENCOUNTER — TELEPHONE (OUTPATIENT)
Dept: FAMILY MEDICINE CLINIC | Facility: CLINIC | Age: 40
End: 2020-07-08

## 2020-07-17 DIAGNOSIS — K21.9 GASTROESOPHAGEAL REFLUX DISEASE, ESOPHAGITIS PRESENCE NOT SPECIFIED: ICD-10-CM

## 2020-07-17 RX ORDER — PANTOPRAZOLE SODIUM 20 MG/1
20 TABLET, DELAYED RELEASE ORAL DAILY
Qty: 30 TABLET | Refills: 2 | Status: SHIPPED | OUTPATIENT
Start: 2020-07-17 | End: 2021-03-19

## 2020-09-14 ENCOUNTER — APPOINTMENT (OUTPATIENT)
Dept: CT IMAGING | Facility: HOSPITAL | Age: 40
End: 2020-09-14

## 2020-09-23 ENCOUNTER — APPOINTMENT (OUTPATIENT)
Dept: CT IMAGING | Facility: HOSPITAL | Age: 40
End: 2020-09-23

## 2020-09-25 ENCOUNTER — HOSPITAL ENCOUNTER (OUTPATIENT)
Dept: CT IMAGING | Facility: HOSPITAL | Age: 40
Discharge: HOME | End: 2020-09-25
Admitting: INTERNAL MEDICINE

## 2020-09-25 DIAGNOSIS — D86.0 PULMONARY SARCOIDOSIS (HCC): Chronic | ICD-10-CM

## 2020-09-25 PROCEDURE — 71250 CT THORAX DX C-: CPT

## 2021-02-02 ENCOUNTER — OFFICE VISIT (OUTPATIENT)
Dept: FAMILY MEDICINE CLINIC | Facility: CLINIC | Age: 41
End: 2021-02-02

## 2021-02-02 VITALS
WEIGHT: 209.2 LBS | HEIGHT: 70 IN | BODY MASS INDEX: 29.95 KG/M2 | HEART RATE: 78 BPM | TEMPERATURE: 97.4 F | OXYGEN SATURATION: 98 % | SYSTOLIC BLOOD PRESSURE: 128 MMHG | DIASTOLIC BLOOD PRESSURE: 72 MMHG

## 2021-02-02 DIAGNOSIS — J45.990 EXERCISE-INDUCED ASTHMA: ICD-10-CM

## 2021-02-02 DIAGNOSIS — K21.9 GASTROESOPHAGEAL REFLUX DISEASE, UNSPECIFIED WHETHER ESOPHAGITIS PRESENT: Primary | ICD-10-CM

## 2021-02-02 DIAGNOSIS — J31.0 CHRONIC RHINITIS: ICD-10-CM

## 2021-02-02 PROCEDURE — 99213 OFFICE O/P EST LOW 20 MIN: CPT | Performed by: STUDENT IN AN ORGANIZED HEALTH CARE EDUCATION/TRAINING PROGRAM

## 2021-02-02 RX ORDER — FLUTICASONE PROPIONATE 50 MCG
1 SPRAY, SUSPENSION (ML) NASAL NIGHTLY
Qty: 9.9 ML | Refills: 5 | Status: SHIPPED | OUTPATIENT
Start: 2021-02-02 | End: 2021-03-19

## 2021-02-02 RX ORDER — ALBUTEROL SULFATE 90 UG/1
2 AEROSOL, METERED RESPIRATORY (INHALATION) EVERY 4 HOURS PRN
Qty: 18 G | Refills: 2 | Status: ON HOLD | OUTPATIENT
Start: 2021-02-02 | End: 2021-06-07 | Stop reason: SDUPTHER

## 2021-02-02 RX ORDER — FAMOTIDINE 20 MG/1
40 TABLET, FILM COATED ORAL 2 TIMES DAILY PRN
Qty: 120 TABLET | Refills: 3 | Status: SHIPPED | OUTPATIENT
Start: 2021-02-02 | End: 2022-12-08 | Stop reason: SDUPTHER

## 2021-02-03 NOTE — PROGRESS NOTES
"  Family Medicine Residency  Jayro Sanchez III, MD    Subjective:     Nitin Jackson is a 40 y.o. male who presents for GERD, exercise-induced asthma, chronic rhinitis    Patient with abdominal pain left upper quadrant.  Patient denying hematochezia or melena.  Patient reporting burning after eating helped with his PPI.  Patient interested in further evaluation of his abdominal pain and acid reflux.    Patient with exercise-induced asthma.  He uses reflexively when he is symptomatic and does not pretreat himself prior to exercise.  He also has had his albuterol .  Patient requesting refill of his albuterol.    Patient with nasal congestion has been off of his intranasal corticosteroids requesting refill as it has helped him in the past.    The following portions of the patient's history were reviewed and updated as appropriate: allergies, current medications, past family history, past medical history, past social history, past surgical history and problem list.    Past Medical Hx:  Past Medical History:   Diagnosis Date   • Allergic rhinitis    • Asthma    • Chronic dermatitis    • Chronic right shoulder pain    • Cigarette nicotine dependence, uncomplicated 2019   • Cigarette smoker 2016   • Cutaneous sarcoidosis 2016   • DJD (degenerative joint disease)     shoulder region, right side   • Dyspnea on exertion    • Elevated blood pressure    • Encounter for routine adult health examination    • Fatigue    • Hypertension     \"NOT TAKING MEDS THAT WERE PRESCRIBED\"   • Obstructive sleep apnea    • Obstructive sleep apnea syndrome 2016   • Pain in left knee    • Vertigo        Past Surgical Hx:  Past Surgical History:   Procedure Laterality Date   • BREAST LUMPECTOMY WITH AXILLARY NODE DISSECTION Left 2017    Procedure: LEFT AXILLARY LYMPH NODE BIOPSYl;  Surgeon: Bryan rAnold MD;  Location: [facility];  Service:    • INJECTION OF MEDICATION  08/10/2016    Kenalog   • SHOULDER " ARTHROSCOPY  01/13/2015    Arthroscopy of shoulder, subacromial decompression, [place], and injection of the shoulder with 80 ml Kenalog   • SHOULDER SURGERY         Current Meds:    Current Outpatient Medications:   •  albuterol sulfate  (90 Base) MCG/ACT inhaler, Inhale 2 puffs Every 4 (Four) Hours As Needed for Wheezing or Shortness of Air., Disp: 18 g, Rfl: 2  •  chlorthalidone (HYGROTON) 25 MG tablet, Take 0.5 tablets by mouth Daily., Disp: 30 tablet, Rfl: 1  •  fluticasone (Flonase) 50 MCG/ACT nasal spray, 1 spray into the nostril(s) as directed by provider Every Night., Disp: 9.9 mL, Rfl: 5  •  montelukast (SINGULAIR) 10 MG tablet, Take 1 tablet by mouth Every Night., Disp: 30 tablet, Rfl: 11  •  nicotine (NICODERM CQ) 7 MG/24HR patch, Place 1 patch on the skin as directed by provider Daily., Disp: 14 each, Rfl: 5  •  pantoprazole (PROTONIX) 20 MG EC tablet, Take 1 tablet by mouth Daily., Disp: 30 tablet, Rfl: 2  •  famotidine (PEPCID) 20 MG tablet, Take 2 tablets by mouth 2 (Two) Times a Day As Needed for Heartburn., Disp: 120 tablet, Rfl: 3  •  fluticasone (FLONASE) 50 MCG/ACT nasal spray, 2 sprays into each nostril Daily for 30 days., Disp: 1 each, Rfl: 11    Allergies:  No Known Allergies    Family Hx:  Family History   Problem Relation Age of Onset   • Diabetes Other    • Heart disease Other    • Hypertension Other         Social History:  Social History     Socioeconomic History   • Marital status: Single     Spouse name: Not on file   • Number of children: Not on file   • Years of education: Not on file   • Highest education level: Not on file   Tobacco Use   • Smoking status: Current Every Day Smoker     Packs/day: 0.50     Years: 20.00     Pack years: 10.00     Types: Cigarettes   • Smokeless tobacco: Never Used   Substance and Sexual Activity   • Alcohol use: Yes     Comment: SOCIAL   • Drug use: No   • Sexual activity: Defer       Review of Systems  Review of Systems   HENT: Positive for  "congestion.    Respiratory: Negative for wheezing.    Gastrointestinal: Positive for abdominal pain. Negative for blood in stool.       Objective:     /72   Pulse 78   Temp 97.4 °F (36.3 °C)   Ht 177.8 cm (70\")   Wt 94.9 kg (209 lb 3.2 oz)   SpO2 98%   BMI 30.02 kg/m²   Physical Exam  Constitutional:       General: He is not in acute distress.     Appearance: He is well-developed. He is not diaphoretic.   Neck:      Thyroid: No thyromegaly.   Cardiovascular:      Rate and Rhythm: Normal rate and regular rhythm.      Heart sounds: Normal heart sounds. No murmur. No friction rub. No gallop.    Pulmonary:      Effort: Pulmonary effort is normal. No respiratory distress.      Breath sounds: Normal breath sounds. No wheezing or rales.   Chest:      Chest wall: No tenderness.   Abdominal:      General: There is no distension.      Palpations: There is no mass.      Tenderness: There is no abdominal tenderness. There is no guarding or rebound.   Musculoskeletal:      Right lower leg: No edema.      Left lower leg: No edema.   Skin:     General: Skin is warm and dry.      Capillary Refill: Capillary refill takes less than 2 seconds.   Neurological:      Mental Status: He is alert and oriented to person, place, and time.      Cranial Nerves: No cranial nerve deficit.          Assessment/Plan:     Diagnoses and all orders for this visit:    1. Gastroesophageal reflux disease, unspecified whether esophagitis present (Primary)  -     famotidine (PEPCID) 20 MG tablet; Take 2 tablets by mouth 2 (Two) Times a Day As Needed for Heartburn.  Dispense: 120 tablet; Refill: 3  -     Ambulatory Referral to Gastroenterology    2. Exercise-induced asthma  -     albuterol sulfate  (90 Base) MCG/ACT inhaler; Inhale 2 puffs Every 4 (Four) Hours As Needed for Wheezing or Shortness of Air.  Dispense: 18 g; Refill: 2    3. Chronic rhinitis  -     fluticasone (Flonase) 50 MCG/ACT nasal spray; 1 spray into the nostril(s) as " directed by provider Every Night.  Dispense: 9.9 mL; Refill: 5    1.  We will give patient symptomatology breakthrough H2 blocker.  Patient also would likely benefit with his history of IgG4 abnormalities and sarcoidosis for endoscopic evaluation.  We will defer to Dr. Amaya gastroenterology for this.  Patient again with no alarm symptoms of hematochezia, melena, hematemesis.    2.  Refill of patient's exercise-induced asthma albuterol.  Counseled him into using it 15 minutes before exercise and to use it at that point then breakthrough if he is symptomatic.    3.  Patient with chronic rhinitis symptoms which have resurfaced.  Patient interested in resuming his intranasal glucocorticoids to help prevent his nasal congestion we switched to night administration due to his SHE.      · Rx changes: As above  · Patient Education: Exercise-induced asthma  · Compliance at present is estimated to be good.   · Efforts to improve compliance (if necessary) will be directed at increased exercise.    Depression screening: Up to date; last screen 2/2/2021     Follow-up:     Return in about 1 month (around 3/2/2021) for GERD SHE.    Preventative:  Health Maintenance   Topic Date Due   • ANNUAL PHYSICAL  07/03/1981   • Pneumococcal Vaccine 0-64 (1 of 1 - PPSV23) 07/03/1984   • HEPATITIS C SCREENING  01/11/2017   • INFLUENZA VACCINE  08/01/2020   • TDAP/TD VACCINES (1 - Tdap) 05/27/2021 (Originally 7/3/1997)   • MENINGOCOCCAL VACCINE  Aged Out     Male Preventative: Exercises regularly  Recommended: none  Vaccine Counseling: N/A    Weight  -Class: Obese Class I: 30-34.9kg/m2  -Patient's Body mass index is 30.02 kg/m². BMI is within normal parameters. No follow-up required..   eat more fruits and vegetables, decrease soda or juice intake, increase water intake, increase physical activity, reduce screen time, reduce portion size, cut out extra servings, reduce fast food intake, family to eat at dinner table more often, keep TV off  during meals, plan meals, eat breakfast and have 3 meals a day    Alcohol use:  reports current alcohol use.  Nicotine status  reports that he has been smoking cigarettes. He has a 10.00 pack-year smoking history. He has never used smokeless tobacco.    Goals     • Sarcoidosis Management (pt-stated)      Barrier to goal:   1. Lack of health insurance.  2. Lost to follow up.            RISK SCORE: 3            This document has been electronically signed by Jayro Sanchez III, MD on February 2, 2021 20:24 CST      Dragon disclaimer: Parts of this note were transcribed using dragon dictation software.

## 2021-02-04 NOTE — PROGRESS NOTES
I have spoken with the patient .   I have reviewed the notes, assessments, and/or procedures performed by Dr. Jayro Sanchez, I concur with his  documentation and assessment and plan for Nitin Munozjennifer Jackson.          This document has been electronically signed by Zeyad Rodriguez MD on February 4, 2021 11:16 CST

## 2021-02-09 ENCOUNTER — OFFICE VISIT (OUTPATIENT)
Dept: GASTROENTEROLOGY | Facility: CLINIC | Age: 41
End: 2021-02-09

## 2021-02-09 VITALS
HEART RATE: 83 BPM | BODY MASS INDEX: 29.41 KG/M2 | WEIGHT: 205.4 LBS | HEIGHT: 70 IN | DIASTOLIC BLOOD PRESSURE: 93 MMHG | SYSTOLIC BLOOD PRESSURE: 132 MMHG

## 2021-02-09 DIAGNOSIS — R19.7 DIARRHEA, UNSPECIFIED TYPE: ICD-10-CM

## 2021-02-09 DIAGNOSIS — R10.13 EPIGASTRIC PAIN: Primary | ICD-10-CM

## 2021-02-09 PROCEDURE — 99204 OFFICE O/P NEW MOD 45 MIN: CPT | Performed by: INTERNAL MEDICINE

## 2021-02-09 RX ORDER — SUCRALFATE 1 G/1
1 TABLET ORAL 4 TIMES DAILY
Qty: 120 TABLET | Refills: 6 | Status: SHIPPED | OUTPATIENT
Start: 2021-02-09 | End: 2021-03-11

## 2021-02-09 RX ORDER — DEXTROSE AND SODIUM CHLORIDE 5; .45 G/100ML; G/100ML
30 INJECTION, SOLUTION INTRAVENOUS CONTINUOUS PRN
Status: CANCELLED | OUTPATIENT
Start: 2021-04-16

## 2021-02-09 RX ORDER — FOLIC ACID/MULTIVIT,IRON,MINER .4-18-35
1 TABLET,CHEWABLE ORAL DAILY
COMMUNITY
End: 2022-12-08

## 2021-02-09 NOTE — PATIENT INSTRUCTIONS
MyPlate from USDA    MyPlate is an outline of a general healthy diet based on the 2010 Dietary Guidelines for Americans, from the U.S. Department of Agriculture (USDA). It sets guidelines for how much food you should eat from each food group based on your age, sex, and level of physical activity.  What are tips for following MyPlate?  To follow MyPlate recommendations:  · Eat a wide variety of fruits and vegetables, grains, and protein foods.  · Serve smaller portions and eat less food throughout the day.  · Limit portion sizes to avoid overeating.  · Enjoy your food.  · Get at least 150 minutes of exercise every week. This is about 30 minutes each day, 5 or more days per week.  It can be difficult to have every meal look like MyPlate. Think about MyPlate as eating guidelines for an entire day, rather than each individual meal.  Fruits and vegetables  · Make half of your plate fruits and vegetables.  · Eat many different colors of fruits and vegetables each day.  · For a 2,000 calorie daily food plan, eat:  ? 2½ cups of vegetables every day.  ? 2 cups of fruit every day.  · 1 cup is equal to:  ? 1 cup raw or cooked vegetables.  ? 1 cup raw fruit.  ? 1 medium-sized orange, apple, or banana.  ? 1 cup 100% fruit or vegetable juice.  ? 2 cups raw leafy greens, such as lettuce, spinach, or kale.  ? ½ cup dried fruit.  Grains  · One fourth of your plate should be grains.  · Make at least half of the grains you eat each day whole grains.  · For a 2,000 calorie daily food plan, eat 6 oz of grains every day.  · 1 oz is equal to:  ? 1 slice bread.  ? 1 cup cereal.  ? ½ cup cooked rice, cereal, or pasta.  Protein  · One fourth of your plate should be protein.  · Eat a wide variety of protein foods, including meat, poultry, fish, eggs, beans, nuts, and tofu.  · For a 2,000 calorie daily food plan, eat 5½ oz of protein every day.  · 1 oz is equal to:  ? 1 oz meat, poultry, or fish.  ? ¼ cup cooked beans.  ? 1 egg.  ? ½ oz nuts  or seeds.  ? 1 Tbsp peanut butter.  Dairy  · Drink fat-free or low-fat (1%) milk.  · Eat or drink dairy as a side to meals.  · For a 2,000 calorie daily food plan, eat or drink 3 cups of dairy every day.  · 1 cup is equal to:  ? 1 cup milk, yogurt, cottage cheese, or soy milk (soy beverage).  ? 2 oz processed cheese.  ? 1½ oz natural cheese.  Fats, oils, salt, and sugars  · Only small amounts of oils are recommended.  · Avoid foods that are high in calories and low in nutritional value (empty calories), like foods high in fat or added sugars.  · Choose foods that are low in salt (sodium). Choose foods that have less than 140 milligrams (mg) of sodium per serving.  · Drink water instead of sugary drinks. Drink enough water each day to keep your urine pale yellow.  Where to find support  · Work with your health care provider or a nutrition specialist (dietitian) to develop a customized eating plan that is right for you.  · Download an ayanna (mobile application) to help you track your daily food intake.  Where to find more information  · Go to ChooseMyPlate.gov for more information.  Summary  · MyPlate is a general guideline for healthy eating from the USDA. It is based on the 2010 Dietary Guidelines for Americans.  · In general, fruits and vegetables should take up ½ of your plate, grains should take up ¼ of your plate, and protein should take up ¼ of your plate.  This information is not intended to replace advice given to you by your health care provider. Make sure you discuss any questions you have with your health care provider.  Document Revised: 05/21/2020 Document Reviewed: 03/19/2018  Elsevier Patient Education © 2020 Elsevier Inc.

## 2021-02-09 NOTE — PROGRESS NOTES
"[facility]      Chief Complaint:   Chief Complaint   Patient presents with   • Heartburn     ref: [email or web address]       Subjective     HPI:   Mr. Jackson is a 40-year-old -American male with past medical history of allergic rhinitis, asthma, dermatitis, degenerative joint disease, sarcoidosis, hypertension, obstructive sleep apnea, vertigo presenting for evaluation of abdominal pain.  Has intermittent bouts of epigastric burning discomfort for past 2 to 3 years.  Pain is intermittent, moderate and is worse with food intake and at night.  He has spicy food intolerance and denied fatty food intolerance.  Also has diarrhea on intermittent basis with food intake and denied nausea, vomiting, constipation, rectal bleeding or weight loss.  Denies GERD symptoms or dysphagia.  Also denied NSAID usage.  He is currently taking Protonix and Pepcid on daily basis without much help.    Past Medical History:   Past Medical History:   Diagnosis Date   • Allergic rhinitis    • Asthma    • Chronic dermatitis    • Chronic right shoulder pain    • Cigarette nicotine dependence, uncomplicated 8/28/2019   • Cigarette smoker 8/24/2016   • Cutaneous sarcoidosis 8/24/2016   • DJD (degenerative joint disease)     shoulder region, right side   • Dyspnea on exertion    • Elevated blood pressure    • Encounter for routine adult health examination    • Fatigue    • Hypertension     \"NOT TAKING MEDS THAT WERE PRESCRIBED\"   • Obstructive sleep apnea    • Obstructive sleep apnea syndrome 8/24/2016   • Pain in left knee    • Vertigo        Past Surgical History:  Past Surgical History:   Procedure Laterality Date   • BREAST LUMPECTOMY WITH AXILLARY NODE DISSECTION Left 8/22/2017    Procedure: LEFT AXILLARY LYMPH NODE BIOPSYl;  Surgeon: Bryan Arnold MD;  Location: [facility];  Service:    • INJECTION OF MEDICATION  08/10/2016    Kenalog   • SHOULDER ARTHROSCOPY  01/13/2015    Arthroscopy of shoulder, subacromial decompression, " [place], and injection of the shoulder with 80 ml Kenalog   • SHOULDER SURGERY     • WISDOM TOOTH EXTRACTION      2 removed       Family History:  Family History   Problem Relation Age of Onset   • Diabetes Other    • Heart disease Other    • Hypertension Other    • No Known Problems Mother    • No Known Problems Father    • No Known Problems Sister    • Heart block Maternal Grandmother    • Lung cancer Paternal Grandmother    • Lung cancer Paternal Grandfather    • No Known Problems Half-Brother    • No Known Problems Half-Brother    • No Known Problems Sister    • No Known Problems Daughter    • No Known Problems Daughter    • No Known Problems Son    • No Known Problems Son        Social History:   reports that he has been smoking cigarettes. He has smoked for the past 20.00 years. He has never used smokeless tobacco. He reports current alcohol use. He reports that he does not use drugs.    Medications:   Prior to Admission medications    Medication Sig Start Date End Date Taking? Authorizing Provider   multivitamin-iron-minerals-folic acid (CENTRUM) chewable tablet Chew 1 tablet Daily.   Yes Provider, MD Jaki   albuterol sulfate  (90 Base) MCG/ACT inhaler Inhale 2 puffs Every 4 (Four) Hours As Needed for Wheezing or Shortness of Air. 2/2/21   Jayro Sanchez III, MD   chlorthalidone (HYGROTON) 25 MG tablet Take 0.5 tablets by mouth Daily. 2/5/20   Alexis Stearns MD   famotidine (PEPCID) 20 MG tablet Take 2 tablets by mouth 2 (Two) Times a Day As Needed for Heartburn. 2/2/21   Jayro Sanchez III, MD   fluticasone (FLONASE) 50 MCG/ACT nasal spray 2 sprays into each nostril Daily for 30 days. 4/26/17 2/2/22  Licha Molina MD   fluticasone (Flonase) 50 MCG/ACT nasal spray 1 spray into the nostril(s) as directed by provider Every Night. 2/2/21   Jayro Sanchez III, MD   montelukast (SINGULAIR) 10 MG tablet Take 1 tablet by mouth Every Night. 12/12/19   Licha Molina MD   nicotine  "(NICODERM CQ) 7 MG/24HR patch Place 1 patch on the skin as directed by provider Daily. 6/4/20   Horace Schwartz MD   pantoprazole (PROTONIX) 20 MG EC tablet Take 1 tablet by mouth Daily. 7/17/20   Shiv Contreras MD   sucralfate (Carafate) 1 g tablet Take 1 tablet by mouth 4 (Four) Times a Day for 30 days. 2/9/21 3/11/21  Smitha Amaya MD       Allergies:  Patient has no known allergies.    ROS:    Review of Systems   Constitutional: Negative for chills, fatigue, fever and unexpected weight change.   HENT: Negative for congestion, ear discharge, hearing loss, nosebleeds and sore throat.    Eyes: Negative for pain, discharge and redness.   Respiratory: Negative for cough, chest tightness, shortness of breath and wheezing.    Cardiovascular: Negative for chest pain and palpitations.   Gastrointestinal: Positive for abdominal pain and diarrhea. Negative for abdominal distention, blood in stool, constipation, nausea and vomiting.   Endocrine: Negative for cold intolerance, polydipsia, polyphagia and polyuria.   Genitourinary: Negative for dysuria, flank pain, frequency, hematuria and urgency.   Musculoskeletal: Negative for arthralgias, back pain, joint swelling and myalgias.   Skin: Negative for color change, pallor and rash.   Neurological: Negative for tremors, seizures, syncope, weakness and headaches.   Hematological: Negative for adenopathy. Does not bruise/bleed easily.   Psychiatric/Behavioral: Negative for behavioral problems, confusion, dysphoric mood, hallucinations and suicidal ideas. The patient is not nervous/anxious.      Objective     Blood pressure 132/93, pulse 83, height 177.8 cm (70\"), weight 93.2 kg (205 lb 6.4 oz).    Physical Exam  Constitutional:       Appearance: He is well-developed.   HENT:      Head: Normocephalic and atraumatic.   Eyes:      Conjunctiva/sclera: Conjunctivae normal.      Pupils: Pupils are equal, round, and reactive to light.   Neck:      Musculoskeletal: " Normal range of motion and neck supple.      Thyroid: No thyromegaly.   Cardiovascular:      Rate and Rhythm: Normal rate and regular rhythm.      Heart sounds: Normal heart sounds. No murmur.   Pulmonary:      Effort: Pulmonary effort is normal.      Breath sounds: Normal breath sounds. No wheezing.   Abdominal:      General: Bowel sounds are normal. There is no distension.      Palpations: Abdomen is soft. There is no mass.      Tenderness: There is no abdominal tenderness.      Hernia: No hernia is present.   Genitourinary:     Comments: No lesions noted  Musculoskeletal: Normal range of motion.         General: No tenderness.   Lymphadenopathy:      Cervical: No cervical adenopathy.   Skin:     General: Skin is warm and dry.      Findings: No rash.   Neurological:      Mental Status: He is alert and oriented to person, place, and time.      Cranial Nerves: No cranial nerve deficit.   Psychiatric:         Thought Content: Thought content normal.        Extremities: No edema, cyanosis or clubbing.    Assessment/Plan    1.  Epigastric pain rule out peptic ulcer disease, gastritis and pancreaticobiliary pathology.  Continue Protonix and Pepcid.  Add Carafate 1 g p.o. 4 times daily.  Schedule EGD for further evaluation.  2.  Abdominal pain with diarrhea rule out inflammatory bowel disease, small intestinal bacterial overgrowth and colorectal neoplasia.  Add Bentyl and schedule colonoscopy.  3.  High BMI, recommend exercise and diet control.  4.  Tobacco usage, recommend cessation.  Diagnoses and all orders for this visit:    1. Epigastric pain (Primary)  -     Case Request; Standing  -     dextrose 5 % and sodium chloride 0.45 % infusion  -     Case Request    2. Diarrhea, unspecified type  -     Case Request; Standing  -     dextrose 5 % and sodium chloride 0.45 % infusion  -     Case Request    Other orders  -     sucralfate (Carafate) 1 g tablet; Take 1 tablet by mouth 4 (Four) Times a Day for 30 days.  Dispense:  120 tablet; Refill: 6  -     Follow Anesthesia Guidelines / Standing Orders; Future  -     Obtain Informed Consent; Future  -     Implement Anesthesia Orders Day of Procedure; Standing  -     Obtain Informed Consent; Standing  -     POC Glucose Once; Standing  -     Insert Peripheral IV; Standing        ESOPHAGOGASTRODUODENOSCOPY (N/A), COLONOSCOPY (N/A)     Diagnosis Plan   1. Epigastric pain  Case Request    dextrose 5 % and sodium chloride 0.45 % infusion    Case Request   2. Diarrhea, unspecified type  Case Request    dextrose 5 % and sodium chloride 0.45 % infusion    Case Request       Anticipated Surgical Procedure:  Orders Placed This Encounter   Procedures   • Follow Anesthesia Guidelines / Standing Orders     Standing Status:   Future   • Obtain Informed Consent     Standing Status:   Future     Order Specific Question:   Informed Consent Given For     Answer:   egd and colonoscopy       The risks, benefits, and alternatives of this procedure have been discussed with the patient or the responsible party- the patient understands and agrees to proceed.            This document has been electronically signed by Smitha Amaya MD on February 9, 2021 12:07 CST

## 2021-03-14 ENCOUNTER — LAB (OUTPATIENT)
Dept: LAB | Facility: HOSPITAL | Age: 41
End: 2021-03-14

## 2021-03-14 DIAGNOSIS — Z01.818 PREOP TESTING: Primary | ICD-10-CM

## 2021-03-19 ENCOUNTER — OFFICE VISIT (OUTPATIENT)
Dept: FAMILY MEDICINE CLINIC | Facility: CLINIC | Age: 41
End: 2021-03-19

## 2021-03-19 VITALS
HEIGHT: 70 IN | TEMPERATURE: 96.2 F | WEIGHT: 204.19 LBS | HEART RATE: 89 BPM | DIASTOLIC BLOOD PRESSURE: 76 MMHG | SYSTOLIC BLOOD PRESSURE: 128 MMHG | BODY MASS INDEX: 29.23 KG/M2 | OXYGEN SATURATION: 99 %

## 2021-03-19 DIAGNOSIS — J30.2 SEASONAL ALLERGIC RHINITIS, UNSPECIFIED TRIGGER: ICD-10-CM

## 2021-03-19 DIAGNOSIS — K21.9 GASTROESOPHAGEAL REFLUX DISEASE, UNSPECIFIED WHETHER ESOPHAGITIS PRESENT: ICD-10-CM

## 2021-03-19 DIAGNOSIS — I10 ESSENTIAL HYPERTENSION: Primary | ICD-10-CM

## 2021-03-19 PROCEDURE — 99213 OFFICE O/P EST LOW 20 MIN: CPT | Performed by: STUDENT IN AN ORGANIZED HEALTH CARE EDUCATION/TRAINING PROGRAM

## 2021-03-19 RX ORDER — CETIRIZINE HYDROCHLORIDE 10 MG/1
10 TABLET ORAL DAILY
Qty: 30 TABLET | Refills: 2 | Status: SHIPPED | OUTPATIENT
Start: 2021-03-19 | End: 2022-12-08

## 2021-03-19 RX ORDER — MONTELUKAST SODIUM 10 MG/1
10 TABLET ORAL NIGHTLY
Qty: 30 TABLET | Refills: 11 | Status: SHIPPED | OUTPATIENT
Start: 2021-03-19 | End: 2022-12-08 | Stop reason: SDUPTHER

## 2021-03-19 RX ORDER — PANTOPRAZOLE SODIUM 40 MG/1
40 TABLET, DELAYED RELEASE ORAL DAILY
Qty: 30 TABLET | Refills: 2 | Status: SHIPPED | OUTPATIENT
Start: 2021-03-19 | End: 2022-12-08 | Stop reason: SDUPTHER

## 2021-03-19 RX ORDER — CHLORTHALIDONE 25 MG/1
12.5 TABLET ORAL DAILY
Qty: 30 TABLET | Refills: 1 | Status: CANCELLED | OUTPATIENT
Start: 2021-03-19

## 2021-03-19 RX ORDER — INDAPAMIDE 1.25 MG/1
1.25 TABLET, FILM COATED ORAL EVERY MORNING
Qty: 30 TABLET | Refills: 2 | Status: SHIPPED | OUTPATIENT
Start: 2021-03-19 | End: 2022-12-08

## 2021-03-19 RX ORDER — FLUTICASONE PROPIONATE 50 MCG
2 SPRAY, SUSPENSION (ML) NASAL DAILY
Start: 2021-03-19 | End: 2022-12-08 | Stop reason: SDUPTHER

## 2021-03-19 NOTE — PROGRESS NOTES
"  Family Medicine Residency  Jayro Sanchez III, MD    Subjective:     Nitin Jackson is a 40 y.o. male who presents for Hypertension, GERD, rhinitis    Patient reporting decreased energy and lightheadedness and dizziness.  Patient was unsure this is related to his blood pressure.  He has not been checking his blood pressure at home and wishes to change blood pressure medicine.    Patient with acid reflux occasionally forgets his PPI.  Rescue therapy with Motrin helps however he is having nighttime symptoms of GERD.    Patient reporting issues with rhinitis.  He had been taking his fluticasone twice a day and requesting other management outside of his nighttime montelukast    The following portions of the patient's history were reviewed and updated as appropriate: allergies, current medications, past family history, past medical history, past social history, past surgical history and problem list.    Past Medical Hx:  Past Medical History:   Diagnosis Date   • Allergic rhinitis    • Asthma    • Chronic dermatitis    • Chronic right shoulder pain    • Cigarette nicotine dependence, uncomplicated 8/28/2019   • Cigarette smoker 8/24/2016   • Cutaneous sarcoidosis 8/24/2016   • DJD (degenerative joint disease)     shoulder region, right side   • Dyspnea on exertion    • Elevated blood pressure    • Encounter for routine adult health examination    • Fatigue    • Hypertension     \"NOT TAKING MEDS THAT WERE PRESCRIBED\"   • Obstructive sleep apnea    • Obstructive sleep apnea syndrome 8/24/2016   • Pain in left knee    • Vertigo        Past Surgical Hx:  Past Surgical History:   Procedure Laterality Date   • BREAST LUMPECTOMY WITH AXILLARY NODE DISSECTION Left 8/22/2017    Procedure: LEFT AXILLARY LYMPH NODE BIOPSYl;  Surgeon: Bryan Arnold MD;  Location: [facility];  Service:    • INJECTION OF MEDICATION  08/10/2016    Kenalog   • SHOULDER ARTHROSCOPY  01/13/2015    Arthroscopy of shoulder, subacromial " decompression, Reed, and injection of the shoulder with 80 ml Kenalog   • SHOULDER SURGERY     • WISDOM TOOTH EXTRACTION      2 removed       Current Meds:    Current Outpatient Medications:   •  albuterol sulfate  (90 Base) MCG/ACT inhaler, Inhale 2 puffs Every 4 (Four) Hours As Needed for Wheezing or Shortness of Air., Disp: 18 g, Rfl: 2  •  famotidine (PEPCID) 20 MG tablet, Take 2 tablets by mouth 2 (Two) Times a Day As Needed for Heartburn., Disp: 120 tablet, Rfl: 3  •  fluticasone (Flonase) 50 MCG/ACT nasal spray, 2 sprays into the nostril(s) as directed by provider Daily for 30 days., Disp: , Rfl:   •  montelukast (SINGULAIR) 10 MG tablet, Take 1 tablet by mouth Every Night., Disp: 30 tablet, Rfl: 11  •  multivitamin-iron-minerals-folic acid (CENTRUM) chewable tablet, Chew 1 tablet Daily., Disp: , Rfl:   •  pantoprazole (PROTONIX) 40 MG EC tablet, Take 1 tablet by mouth Daily., Disp: 30 tablet, Rfl: 2  •  cetirizine (zyrTEC) 10 MG tablet, Take 1 tablet by mouth Daily., Disp: 30 tablet, Rfl: 2  •  indapamide (LOZOL) 1.25 MG tablet, Take 1 tablet by mouth Every Morning., Disp: 30 tablet, Rfl: 2    Allergies:  No Known Allergies    Family Hx:  Family History   Problem Relation Age of Onset   • Diabetes Other    • Heart disease Other    • Hypertension Other    • No Known Problems Mother    • No Known Problems Father    • No Known Problems Sister    • Heart block Maternal Grandmother    • Lung cancer Paternal Grandmother    • Lung cancer Paternal Grandfather    • No Known Problems Half-Brother    • No Known Problems Half-Brother    • No Known Problems Sister    • No Known Problems Daughter    • No Known Problems Daughter    • No Known Problems Son    • No Known Problems Son         Social History:  Social History     Socioeconomic History   • Marital status: Single     Spouse name: Not on file   • Number of children: Not on file   • Years of education: Not on file   • Highest education level: Not on file  "  Tobacco Use   • Smoking status: Current Some Day Smoker     Years: 20.00     Types: Cigarettes   • Smokeless tobacco: Never Used   • Tobacco comment: 3 cigarettes on days that he smokes   Vaping Use   • Vaping Use: Never used   Substance and Sexual Activity   • Alcohol use: Yes     Comment: SOCIAL   • Drug use: No   • Sexual activity: Defer       Review of Systems  Review of Systems   Constitutional: Positive for fatigue.   HENT: Positive for congestion, sinus pressure, sinus pain and sneezing.    Gastrointestinal:        Acid reflux   Neurological: Positive for dizziness and light-headedness.       Objective:     /76   Pulse 89   Temp 96.2 °F (35.7 °C)   Ht 177.8 cm (70\")   Wt 92.6 kg (204 lb 3 oz)   SpO2 99%   BMI 29.30 kg/m²   Physical Exam  Constitutional:       General: He is not in acute distress.     Appearance: He is well-developed. He is not diaphoretic.   Neck:      Thyroid: No thyromegaly.   Cardiovascular:      Rate and Rhythm: Normal rate and regular rhythm.      Heart sounds: Normal heart sounds. No murmur heard.   No friction rub. No gallop.    Pulmonary:      Effort: Pulmonary effort is normal. No respiratory distress.      Breath sounds: Normal breath sounds. No wheezing or rales.   Chest:      Chest wall: No tenderness.   Abdominal:      General: Abdomen is flat. There is no distension.      Palpations: Abdomen is soft. There is no mass.      Tenderness: There is no abdominal tenderness.   Musculoskeletal:      Right lower leg: No edema.      Left lower leg: No edema.   Skin:     General: Skin is warm and dry.      Capillary Refill: Capillary refill takes less than 2 seconds.   Neurological:      Mental Status: He is alert and oriented to person, place, and time.      Cranial Nerves: No cranial nerve deficit.          Assessment/Plan:     Diagnoses and all orders for this visit:    1. Essential hypertension (Primary)  -     indapamide (LOZOL) 1.25 MG tablet; Take 1 tablet by mouth " Every Morning.  Dispense: 30 tablet; Refill: 2    2. Gastroesophageal reflux disease, unspecified whether esophagitis present  -     pantoprazole (PROTONIX) 40 MG EC tablet; Take 1 tablet by mouth Daily.  Dispense: 30 tablet; Refill: 2    3. Seasonal allergic rhinitis, unspecified trigger  -     montelukast (SINGULAIR) 10 MG tablet; Take 1 tablet by mouth Every Night.  Dispense: 30 tablet; Refill: 11  -     fluticasone (Flonase) 50 MCG/ACT nasal spray; 2 sprays into the nostril(s) as directed by provider Daily for 30 days.  -     cetirizine (zyrTEC) 10 MG tablet; Take 1 tablet by mouth Daily.  Dispense: 30 tablet; Refill: 2    Other orders  -     Cancel: chlorthalidone (HYGROTON) 25 MG tablet; Take 0.5 tablets by mouth Daily.  Dispense: 30 tablet; Refill: 1    1.  We will change patient from chlorthalidone to indapamide.  Hopefully lower milligrams will help with his lightheadedness and dizziness.  If not we will have to consider ACE or ARB despite thiazide being better and patient demographic.    2.  We will maximize patient's PPI dosage.  Patient counseled to take H2 blocker 40 mg at night in addition to second-generation H1 antagonist to help with sleep as well and nighttime rhinitis.  Also perceived benefit of cetirizine for help with sleep.    3.  Counseled patient to take intranasal corticosteroid 1 time a day in the morning where it helps him the most and to combine cetirizine with mild Alexis at night for sleep and for congestion.  His nighttime symptoms are mostly acid reflux keeping him awake which we have double covered with antihistaminergic and allergic symptoms.    · Rx changes: As above  · Patient Education: GERD  · Compliance at present is estimated to be good.   · Efforts to improve compliance (if necessary) will be directed at increased exercise.    Depression screening: Up to date; last screen 2/2/2021     Follow-up:     Return in about 1 month (around 4/19/2021) for HTn GERD  Allergies.    Preventative:  Health Maintenance   Topic Date Due   • ANNUAL PHYSICAL  Never done   • Pneumococcal Vaccine 0-64 (1 of 1 - PPSV23) Never done   • HEPATITIS C SCREENING  Never done   • INFLUENZA VACCINE  08/01/2020   • TDAP/TD VACCINES (1 - Tdap) 05/27/2021 (Originally 7/3/1997)   • MENINGOCOCCAL VACCINE  Aged Out     Male Preventative: Exercises regularly  Recommended: none  Vaccine Counseling: N/A    Weight  -Class: Overweight: 25.0-29.9kg/m2   -Patient's Body mass index is 29.3 kg/m². BMI is above normal parameters. Recommendations include: educational material and nutrition counseling.   eat more fruits and vegetables, decrease soda or juice intake, increase water intake, increase physical activity, reduce screen time, reduce portion size, cut out extra servings, reduce fast food intake, family to eat at dinner table more often, keep TV off during meals, plan meals, eat breakfast and have 3 meals a day    Alcohol use:  reports current alcohol use.  Nicotine status  reports that he has been smoking cigarettes. He has smoked for the past 20.00 years. He has never used smokeless tobacco.    Goals     •  Sarcoidosis Management (pt-stated)       Barrier to goal:   1. Lack of health insurance.  2. Lost to follow up.            RISK SCORE: 3            This document has been electronically signed by Jayro Sanchez III, MD on March 19, 2021 16:19 CDT      Dragon disclaimer: Parts of this note were transcribed using dragon dictation software.

## 2021-03-24 NOTE — PROGRESS NOTES
I have seen the patient.  I have reviewed the notes, assessments, and/or procedures performed by *Dr Sanchez** during office visit I concur with her/his documentation and assessment and plan for Nitin Arnold Manuel.              This document has been electronically signed by Miguel Doyle MD on March 24, 2021 10:11 CDT

## 2021-03-30 ENCOUNTER — LAB (OUTPATIENT)
Dept: LAB | Facility: HOSPITAL | Age: 41
End: 2021-03-30

## 2021-03-30 DIAGNOSIS — Z01.818 PREOP TESTING: Primary | ICD-10-CM

## 2021-04-13 ENCOUNTER — LAB (OUTPATIENT)
Dept: LAB | Facility: HOSPITAL | Age: 41
End: 2021-04-13

## 2021-04-13 DIAGNOSIS — Z01.818 PREOP TESTING: Primary | ICD-10-CM

## 2021-04-13 LAB — SARS-COV-2 N GENE RESP QL NAA+PROBE: NOT DETECTED

## 2021-04-13 PROCEDURE — C9803 HOPD COVID-19 SPEC COLLECT: HCPCS

## 2021-04-13 PROCEDURE — 87635 SARS-COV-2 COVID-19 AMP PRB: CPT

## 2021-04-14 ENCOUNTER — TELEPHONE (OUTPATIENT)
Dept: FAMILY MEDICINE CLINIC | Facility: CLINIC | Age: 41
End: 2021-04-14

## 2021-04-14 NOTE — TELEPHONE ENCOUNTER
PATIENT CALLED STATING THAT HE IS HAVING A SCOPE DONE AND WAS TOLD THAT HE NEEDED OUR OFFICE TO PUT IN THE ORDER FOR THE DYE.    CALL BACK NUMBER FOR HIM -[phone].    THANKS,  SAGAR

## 2021-04-15 ENCOUNTER — TELEPHONE (OUTPATIENT)
Dept: GASTROENTEROLOGY | Facility: CLINIC | Age: 41
End: 2021-04-15

## 2021-04-15 NOTE — TELEPHONE ENCOUNTER
04/15/2021------- Called patient to let him know that I was faxing over his prep instruction to the pharmacy [facility]. I Gave the patient Magnesia Citrate for his prep for colonoscopy 04/16/2021. The pharmacy was out of the prep that his insurance would cove that is why I had to do the magnesia citrate.

## 2021-04-21 ENCOUNTER — TELEPHONE (OUTPATIENT)
Dept: FAMILY MEDICINE CLINIC | Facility: CLINIC | Age: 41
End: 2021-04-21

## 2021-06-04 ENCOUNTER — LAB (OUTPATIENT)
Dept: LAB | Facility: HOSPITAL | Age: 41
End: 2021-06-04

## 2021-06-04 DIAGNOSIS — Z01.818 PREOP TESTING: Primary | ICD-10-CM

## 2021-06-04 LAB — SARS-COV-2 N GENE RESP QL NAA+PROBE: NOT DETECTED

## 2021-06-04 PROCEDURE — C9803 HOPD COVID-19 SPEC COLLECT: HCPCS

## 2021-06-04 PROCEDURE — 87635 SARS-COV-2 COVID-19 AMP PRB: CPT

## 2021-06-07 ENCOUNTER — ANESTHESIA EVENT (OUTPATIENT)
Dept: GASTROENTEROLOGY | Facility: HOSPITAL | Age: 41
End: 2021-06-07

## 2021-06-07 ENCOUNTER — PATIENT OUTREACH (OUTPATIENT)
Dept: FAMILY MEDICINE CLINIC | Facility: CLINIC | Age: 41
End: 2021-06-07

## 2021-06-07 ENCOUNTER — ANESTHESIA (OUTPATIENT)
Dept: GASTROENTEROLOGY | Facility: HOSPITAL | Age: 41
End: 2021-06-07

## 2021-06-07 ENCOUNTER — HOSPITAL ENCOUNTER (OUTPATIENT)
Facility: HOSPITAL | Age: 41
Setting detail: HOSPITAL OUTPATIENT SURGERY
Discharge: HOME | End: 2021-06-07
Attending: INTERNAL MEDICINE | Admitting: INTERNAL MEDICINE

## 2021-06-07 VITALS
WEIGHT: 200.4 LBS | HEIGHT: 70 IN | BODY MASS INDEX: 28.69 KG/M2 | SYSTOLIC BLOOD PRESSURE: 142 MMHG | RESPIRATION RATE: 18 BRPM | DIASTOLIC BLOOD PRESSURE: 89 MMHG | HEART RATE: 84 BPM | TEMPERATURE: 97.4 F | OXYGEN SATURATION: 98 %

## 2021-06-07 DIAGNOSIS — J45.990 EXERCISE-INDUCED ASTHMA: ICD-10-CM

## 2021-06-07 DIAGNOSIS — R10.13 EPIGASTRIC PAIN: ICD-10-CM

## 2021-06-07 DIAGNOSIS — R19.7 DIARRHEA, UNSPECIFIED TYPE: ICD-10-CM

## 2021-06-07 PROCEDURE — 25010000002 PROPOFOL 10 MG/ML EMULSION: Performed by: NURSE ANESTHETIST, CERTIFIED REGISTERED

## 2021-06-07 PROCEDURE — 43239 EGD BIOPSY SINGLE/MULTIPLE: CPT | Performed by: INTERNAL MEDICINE

## 2021-06-07 PROCEDURE — 45380 COLONOSCOPY AND BIOPSY: CPT | Performed by: INTERNAL MEDICINE

## 2021-06-07 RX ORDER — DEXTROSE AND SODIUM CHLORIDE 5; .45 G/100ML; G/100ML
30 INJECTION, SOLUTION INTRAVENOUS CONTINUOUS PRN
Status: DISCONTINUED | OUTPATIENT
Start: 2021-06-07 | End: 2021-06-07 | Stop reason: HOSPADM

## 2021-06-07 RX ORDER — ALBUTEROL SULFATE 90 UG/1
2 AEROSOL, METERED RESPIRATORY (INHALATION) EVERY 4 HOURS PRN
Qty: 18 G | Refills: 2 | Status: SHIPPED | OUTPATIENT
Start: 2021-06-07 | End: 2022-12-08 | Stop reason: SDUPTHER

## 2021-06-07 RX ORDER — LIDOCAINE HYDROCHLORIDE 20 MG/ML
INJECTION, SOLUTION EPIDURAL; INFILTRATION; INTRACAUDAL; PERINEURAL AS NEEDED
Status: DISCONTINUED | OUTPATIENT
Start: 2021-06-07 | End: 2021-06-07 | Stop reason: SURG

## 2021-06-07 RX ORDER — PROPOFOL 10 MG/ML
VIAL (ML) INTRAVENOUS AS NEEDED
Status: DISCONTINUED | OUTPATIENT
Start: 2021-06-07 | End: 2021-06-07 | Stop reason: SURG

## 2021-06-07 RX ORDER — ONDANSETRON 2 MG/ML
4 INJECTION INTRAMUSCULAR; INTRAVENOUS ONCE AS NEEDED
Status: DISCONTINUED | OUTPATIENT
Start: 2021-06-07 | End: 2021-06-07 | Stop reason: HOSPADM

## 2021-06-07 RX ADMIN — PROPOFOL 20 MG: 10 INJECTION, EMULSION INTRAVENOUS at 15:03

## 2021-06-07 RX ADMIN — PROPOFOL 100 MG: 10 INJECTION, EMULSION INTRAVENOUS at 14:56

## 2021-06-07 RX ADMIN — PROPOFOL 30 MG: 10 INJECTION, EMULSION INTRAVENOUS at 15:01

## 2021-06-07 RX ADMIN — DEXTROSE AND SODIUM CHLORIDE 30 ML/HR: 5; 450 INJECTION, SOLUTION INTRAVENOUS at 14:26

## 2021-06-07 RX ADMIN — PROPOFOL 20 MG: 10 INJECTION, EMULSION INTRAVENOUS at 14:59

## 2021-06-07 RX ADMIN — LIDOCAINE HYDROCHLORIDE 100 MG: 20 INJECTION, SOLUTION EPIDURAL; INFILTRATION; INTRACAUDAL; PERINEURAL at 14:56

## 2021-06-07 NOTE — ANESTHESIA PREPROCEDURE EVALUATION
Anesthesia Evaluation     no history of anesthetic complications:  NPO Solid Status: > 8 hours  NPO Liquid Status: > 8 hours           Airway   Mallampati: III  TM distance: >3 FB  Neck ROM: full  Possible difficult intubation  Dental - normal exam     Pulmonary - normal exam   (+) a smoker Current Abstained day of surgery, COPD mild, asthma,sleep apnea on CPAP,   Cardiovascular - normal exam    (+) hypertension well controlled less than 2 medications,   (-) CAD, dysrhythmias      Neuro/Psych  (+) dizziness/light headedness,     (-) seizures, CVA  GI/Hepatic/Renal/Endo    (+)  GERD well controlled,    (-) diabetes    Musculoskeletal     Abdominal  - normal exam   Substance History   (+) drug use (THC)     OB/GYN          Other   arthritis,                      Anesthesia Plan    ASA 3     MAC     intravenous induction     Anesthetic plan, all risks, benefits, and alternatives have been provided, discussed and informed consent has been obtained with: patient.

## 2021-06-07 NOTE — H&P
"[facility]      Chief Complaint:   No chief complaint on file.      Subjective     HPI:   Mr. Jackson is a 40-year-old -American male with past medical history of allergic rhinitis, asthma, dermatitis, degenerative joint disease, sarcoidosis, hypertension, obstructive sleep apnea, vertigo presenting for evaluation of abdominal pain.  Has intermittent bouts of epigastric burning discomfort for past 2 to 3 years.  Pain is intermittent, moderate and is worse with food intake and at night.  He has spicy food intolerance and denied fatty food intolerance.  Also has diarrhea on intermittent basis with food intake and denied nausea, vomiting, constipation, rectal bleeding or weight loss.  Denies GERD symptoms or dysphagia.  Also denied NSAID usage.  He is currently taking Protonix and Pepcid on daily basis without much help.    Past Medical History:   Past Medical History:   Diagnosis Date   • Allergic rhinitis    • Asthma    • Chronic dermatitis    • Chronic right shoulder pain    • Cigarette nicotine dependence, uncomplicated 8/28/2019   • Cigarette smoker 8/24/2016   • Cutaneous sarcoidosis 8/24/2016   • DJD (degenerative joint disease)     shoulder region, right side   • Dyspnea on exertion    • Elevated blood pressure    • Encounter for routine adult health examination    • Fatigue    • Hypertension     \"NOT TAKING MEDS THAT WERE PRESCRIBED\"   • Obstructive sleep apnea    • Obstructive sleep apnea syndrome 8/24/2016   • Pain in left knee    • Vertigo        Past Surgical History:    Past Surgical History:   Procedure Laterality Date   • BREAST LUMPECTOMY WITH AXILLARY NODE DISSECTION Left 8/22/2017    Procedure: LEFT AXILLARY LYMPH NODE BIOPSYl;  Surgeon: Bryan Arnold MD;  Location: [facility];  Service:    • INJECTION OF MEDICATION  08/10/2016    Kenalog   • SHOULDER ARTHROSCOPY  01/13/2015    Arthroscopy of shoulder, subacromial decompression, Reed, and injection of the shoulder with " 80 ml Kenalog   • SHOULDER SURGERY     • WISDOM TOOTH EXTRACTION      2 removed       Family History:  Family History   Problem Relation Age of Onset   • Diabetes Other    • Heart disease Other    • Hypertension Other    • No Known Problems Mother    • No Known Problems Father    • No Known Problems Sister    • Heart block Maternal Grandmother    • Lung cancer Paternal Grandmother    • Lung cancer Paternal Grandfather    • No Known Problems Half-Brother    • No Known Problems Half-Brother    • No Known Problems Sister    • No Known Problems Daughter    • No Known Problems Daughter    • No Known Problems Son    • No Known Problems Son        Social History:   reports that he has been smoking cigarettes. He has smoked for the past 20.00 years. He has never used smokeless tobacco. He reports current alcohol use. He reports current drug use. Drug: Marijuana.    Medications:   Prior to Admission medications    Medication Sig Start Date End Date Taking? Authorizing Provider   multivitamin-iron-minerals-folic acid (CENTRUM) chewable tablet Chew 1 tablet Daily.   Yes Provider, MD Jaki   albuterol sulfate  (90 Base) MCG/ACT inhaler Inhale 2 puffs Every 4 (Four) Hours As Needed for Wheezing or Shortness of Air. 2/2/21   Jayro Sanchez III, MD   chlorthalidone (HYGROTON) 25 MG tablet Take 0.5 tablets by mouth Daily. 2/5/20   Alexis Stearns MD   famotidine (PEPCID) 20 MG tablet Take 2 tablets by mouth 2 (Two) Times a Day As Needed for Heartburn. 2/2/21   Jayro Sanchez III, MD   fluticasone (FLONASE) 50 MCG/ACT nasal spray 2 sprays into each nostril Daily for 30 days. 4/26/17 2/2/22  Licha Molina MD   fluticasone (Flonase) 50 MCG/ACT nasal spray 1 spray into the nostril(s) as directed by provider Every Night. 2/2/21   Jayro Sanchez III, MD   montelukast (SINGULAIR) 10 MG tablet Take 1 tablet by mouth Every Night. 12/12/19   Licha Molina MD   nicotine (NICODERM CQ) 7 MG/24HR patch Place 1  "patch on the skin as directed by provider Daily. 6/4/20   Horace Schwartz MD   pantoprazole (PROTONIX) 20 MG EC tablet Take 1 tablet by mouth Daily. 7/17/20   Shiv Contreras MD   sucralfate (Carafate) 1 g tablet Take 1 tablet by mouth 4 (Four) Times a Day for 30 days. 2/9/21 3/11/21  Smitha Amaya MD       Allergies:  Patient has no known allergies.    ROS:    Review of Systems   Constitutional: Negative for chills, fatigue, fever and unexpected weight change.   HENT: Negative for congestion, ear discharge, hearing loss, nosebleeds and sore throat.    Eyes: Negative for pain, discharge and redness.   Respiratory: Negative for cough, chest tightness, shortness of breath and wheezing.    Cardiovascular: Negative for chest pain and palpitations.   Gastrointestinal: Positive for abdominal pain and diarrhea. Negative for abdominal distention, blood in stool, constipation, nausea and vomiting.   Endocrine: Negative for cold intolerance, polydipsia, polyphagia and polyuria.   Genitourinary: Negative for dysuria, flank pain, frequency, hematuria and urgency.   Musculoskeletal: Negative for arthralgias, back pain, joint swelling and myalgias.   Skin: Negative for color change, pallor and rash.   Neurological: Negative for tremors, seizures, syncope, weakness and headaches.   Hematological: Negative for adenopathy. Does not bruise/bleed easily.   Psychiatric/Behavioral: Negative for behavioral problems, confusion, dysphoric mood, hallucinations and suicidal ideas. The patient is not nervous/anxious.      Objective     Blood pressure 145/98, pulse 70, temperature 97.6 °F (36.4 °C), resp. rate 16, height 177.8 cm (70\"), weight 90.9 kg (200 lb 6.4 oz), SpO2 97 %.    Physical Exam  Constitutional:       Appearance: He is well-developed.   HENT:      Head: Normocephalic and atraumatic.   Eyes:      Conjunctiva/sclera: Conjunctivae normal.      Pupils: Pupils are equal, round, and reactive to light.   Neck:      " Thyroid: No thyromegaly.   Cardiovascular:      Rate and Rhythm: Normal rate and regular rhythm.      Heart sounds: Normal heart sounds. No murmur heard.     Pulmonary:      Effort: Pulmonary effort is normal.      Breath sounds: Normal breath sounds. No wheezing.   Abdominal:      General: Bowel sounds are normal. There is no distension.      Palpations: Abdomen is soft. There is no mass.      Tenderness: There is no abdominal tenderness.      Hernia: No hernia is present.   Genitourinary:     Comments: No lesions noted  Musculoskeletal:         General: No tenderness. Normal range of motion.      Cervical back: Normal range of motion and neck supple.   Lymphadenopathy:      Cervical: No cervical adenopathy.   Skin:     General: Skin is warm and dry.      Findings: No rash.   Neurological:      Mental Status: He is alert and oriented to person, place, and time.      Cranial Nerves: No cranial nerve deficit.   Psychiatric:         Thought Content: Thought content normal.        Extremities: No edema, cyanosis or clubbing.    Assessment/Plan    1.  Epigastric pain rule out peptic ulcer disease, gastritis and pancreaticobiliary pathology.  Continue Protonix and Pepcid.  Add Carafate 1 g p.o. 4 times daily.  Schedule EGD for further evaluation.  2.  Abdominal pain with diarrhea rule out inflammatory bowel disease, small intestinal bacterial overgrowth and colorectal neoplasia.  Add Bentyl and schedule colonoscopy.  3.  High BMI, recommend exercise and diet control.  4.  Tobacco usage, recommend cessation.  Diagnoses and all orders for this visit:    1. Epigastric pain  -     dextrose 5 % and sodium chloride 0.45 % infusion    2. Diarrhea, unspecified type  -     dextrose 5 % and sodium chloride 0.45 % infusion    Other orders  -     POC Glucose Once; Standing  -     Implement Anesthesia Orders Day of Procedure; Standing  -     Obtain Informed Consent; Standing  -     Insert Peripheral IV; Standing  -     POC Glucose  Once  -     Implement Anesthesia Orders Day of Procedure  -     Obtain Informed Consent  -     Insert Peripheral IV  -     Oxygen Therapy- Nasal Cannula; Titrate for SPO2: 90% - 95%; Standing  -     Pulse Oximetry, Continuous; Standing  -     Vital Signs Every 5 Minutes for 15 Minutes, Every 15 Minutes Thereafter.; Standing  -     Notify Anesthesia of Any Acute Changes in Patient Condition; Standing  -     Notify Anesthesia for Unrelieved Pain; Standing  -     ondansetron (ZOFRAN) injection 4 mg  -     Once Discharge Criteria to Floor Met, Follow Surgeon Orders; Standing  -     Discharge Patient From PACU When Discharge Criteria Met; Standing  -     POC Glucose STAT; Standing  -     Oxygen Therapy- Nasal Cannula; Titrate for SPO2: 90% - 95%  -     Pulse Oximetry, Continuous  -     Vital Signs Every 5 Minutes for 15 Minutes, Every 15 Minutes Thereafter.  -     Notify Anesthesia of Any Acute Changes in Patient Condition  -     Notify Anesthesia for Unrelieved Pain  -     Once Discharge Criteria to Floor Met, Follow Surgeon Orders  -     Discharge Patient From PACU When Discharge Criteria Met  -     POC Glucose STAT        ESOPHAGOGASTRODUODENOSCOPY (N/A), COLONOSCOPY (N/A)     Diagnosis Plan   1. Epigastric pain  dextrose 5 % and sodium chloride 0.45 % infusion   2. Diarrhea, unspecified type  dextrose 5 % and sodium chloride 0.45 % infusion       Anticipated Surgical Procedure:  Orders Placed This Encounter   Procedures   • Implement Anesthesia Orders Day of Procedure     Standing Status:   Standing     Number of Occurrences:   1   • Obtain Informed Consent     Standing Status:   Standing     Number of Occurrences:   1     Order Specific Question:   Informed Consent Given For     Answer:   egd and colonoscopy   • Pulse Oximetry, Continuous     Standing Status:   Standing     Number of Occurrences:   1   • Vital Signs Every 5 Minutes for 15 Minutes, Every 15 Minutes Thereafter.     Standing Status:   Standing      Number of Occurrences:   1   • Notify Anesthesia of Any Acute Changes in Patient Condition     Standing Status:   Standing     Number of Occurrences:   1     Order Specific Question:   Other     Answer:   Any Acute Changes in Patient Condition   • Notify Anesthesia for Unrelieved Pain     Standing Status:   Standing     Number of Occurrences:   1     Order Specific Question:   Other     Answer:   Unrelieved Pain   • Once Discharge Criteria to Floor Met, Follow Surgeon Orders     Standing Status:   Standing     Number of Occurrences:   1   • Discharge Patient From PACU When Discharge Criteria Met     Standing Status:   Standing     Number of Occurrences:   1   • Oxygen Therapy- Nasal Cannula; Titrate for SPO2: 90% - 95%     Standing Status:   Standing     Number of Occurrences:   1     Order Specific Question:   Device     Answer:   Nasal Cannula     Order Specific Question:   Titrate for SPO2     Answer:   90% - 95%     Order Specific Question:   Indications for Oxygen Therapy     Answer:   Immediate Post-Op Period   • POC Glucose Once     Prior to Procedure on ALL Diabetic Patients     Standing Status:   Standing     Number of Occurrences:   1   • POC Glucose STAT     Post op Glucose Check on All Diabetic Patients, Notify Anesthesia if Blood Sugar is Less Than 80 mg/dL or Greater Than 180mg/dL     Standing Status:   Standing     Number of Occurrences:   1     Order Specific Question:   Release to patient     Answer:   Immediate   • Insert Peripheral IV     Standing Status:   Standing     Number of Occurrences:   1       The risks, benefits, and alternatives of this procedure have been discussed with the patient or the responsible party- the patient understands and agrees to proceed.            This document has been electronically signed by Smitha Amaya MD on June 7, 2021 14:48 CDT

## 2021-06-07 NOTE — ANESTHESIA POSTPROCEDURE EVALUATION
Patient: Nitin Jackson    Procedure Summary     Date: 06/07/21 Room / Location: [facility] ENDOSCOPY 1 / [facility] ENDOSCOPY    Anesthesia Start: 1448 Anesthesia Stop: 1513    Procedures:       ESOPHAGOGASTRODUODENOSCOPY (N/A )      COLONOSCOPY (N/A ) Diagnosis:       Epigastric pain      Diarrhea, unspecified type      (Epigastric pain [R10.13])      (Diarrhea, unspecified type [R19.7])    Surgeons: Smitha Amaya MD Provider: Edel Desai CRNA    Anesthesia Type: MAC ASA Status: 3          Anesthesia Type: MAC    Vitals  No vitals data found for the desired time range.          Post Anesthesia Care and Evaluation    Patient location during evaluation: bedside  Patient participation: complete - patient participated  Level of consciousness: awake  Pain score: 0  Pain management: adequate  Airway patency: patent  Anesthetic complications: No anesthetic complications  PONV Status: none  Cardiovascular status: acceptable  Respiratory status: acceptable  Hydration status: acceptable    Comments: ---------------------------               06/07/21                      1513         ---------------------------   BP:          135/86        Pulse:         70           Resp:          16           Temp:   97.6 °F (36.4 °C)   SpO2:          97%         ---------------------------

## 2021-06-09 LAB
LAB AP CASE REPORT: NORMAL
PATH REPORT.FINAL DX SPEC: NORMAL

## 2021-06-14 ENCOUNTER — OFFICE VISIT (OUTPATIENT)
Dept: GASTROENTEROLOGY | Facility: CLINIC | Age: 41
End: 2021-06-14

## 2021-06-14 VITALS
HEIGHT: 70 IN | WEIGHT: 204.6 LBS | HEART RATE: 82 BPM | DIASTOLIC BLOOD PRESSURE: 107 MMHG | BODY MASS INDEX: 29.29 KG/M2 | SYSTOLIC BLOOD PRESSURE: 146 MMHG

## 2021-06-14 DIAGNOSIS — R10.13 EPIGASTRIC PAIN: Primary | ICD-10-CM

## 2021-06-14 PROCEDURE — 99214 OFFICE O/P EST MOD 30 MIN: CPT | Performed by: INTERNAL MEDICINE

## 2021-06-14 RX ORDER — SUCRALFATE ORAL 1 G/10ML
1 SUSPENSION ORAL 4 TIMES DAILY
Qty: 4 G | Refills: 5 | Status: SHIPPED | OUTPATIENT
Start: 2021-06-14 | End: 2021-11-09

## 2021-06-14 NOTE — PATIENT INSTRUCTIONS
MyPlate from USDA    MyPlate is an outline of a general healthy diet based on the 2010 Dietary Guidelines for Americans, from the U.S. Department of Agriculture (USDA). It sets guidelines for how much food you should eat from each food group based on your age, sex, and level of physical activity.  What are tips for following MyPlate?  To follow MyPlate recommendations:  · Eat a wide variety of fruits and vegetables, grains, and protein foods.  · Serve smaller portions and eat less food throughout the day.  · Limit portion sizes to avoid overeating.  · Enjoy your food.  · Get at least 150 minutes of exercise every week. This is about 30 minutes each day, 5 or more days per week.  It can be difficult to have every meal look like MyPlate. Think about MyPlate as eating guidelines for an entire day, rather than each individual meal.  Fruits and vegetables  · Make half of your plate fruits and vegetables.  · Eat many different colors of fruits and vegetables each day.  · For a 2,000 calorie daily food plan, eat:  ? 2½ cups of vegetables every day.  ? 2 cups of fruit every day.  · 1 cup is equal to:  ? 1 cup raw or cooked vegetables.  ? 1 cup raw fruit.  ? 1 medium-sized orange, apple, or banana.  ? 1 cup 100% fruit or vegetable juice.  ? 2 cups raw leafy greens, such as lettuce, spinach, or kale.  ? ½ cup dried fruit.  Grains  · One fourth of your plate should be grains.  · Make at least half of the grains you eat each day whole grains.  · For a 2,000 calorie daily food plan, eat 6 oz of grains every day.  · 1 oz is equal to:  ? 1 slice bread.  ? 1 cup cereal.  ? ½ cup cooked rice, cereal, or pasta.  Protein  · One fourth of your plate should be protein.  · Eat a wide variety of protein foods, including meat, poultry, fish, eggs, beans, nuts, and tofu.  · For a 2,000 calorie daily food plan, eat 5½ oz of protein every day.  · 1 oz is equal to:  ? 1 oz meat, poultry, or fish.  ? ¼ cup cooked beans.  ? 1 egg.  ? ½ oz nuts  or seeds.  ? 1 Tbsp peanut butter.  Dairy  · Drink fat-free or low-fat (1%) milk.  · Eat or drink dairy as a side to meals.  · For a 2,000 calorie daily food plan, eat or drink 3 cups of dairy every day.  · 1 cup is equal to:  ? 1 cup milk, yogurt, cottage cheese, or soy milk (soy beverage).  ? 2 oz processed cheese.  ? 1½ oz natural cheese.  Fats, oils, salt, and sugars  · Only small amounts of oils are recommended.  · Avoid foods that are high in calories and low in nutritional value (empty calories), like foods high in fat or added sugars.  · Choose foods that are low in salt (sodium). Choose foods that have less than 140 milligrams (mg) of sodium per serving.  · Drink water instead of sugary drinks. Drink enough water each day to keep your urine pale yellow.  Where to find support  · Work with your health care provider or a nutrition specialist (dietitian) to develop a customized eating plan that is right for you.  · Download an ayanna (mobile application) to help you track your daily food intake.  Where to find more information  · Go to ChooseMyPlate.gov for more information.  Summary  · MyPlate is a general guideline for healthy eating from the USDA. It is based on the 2010 Dietary Guidelines for Americans.  · In general, fruits and vegetables should take up ½ of your plate, grains should take up ¼ of your plate, and protein should take up ¼ of your plate.  This information is not intended to replace advice given to you by your health care provider. Make sure you discuss any questions you have with your health care provider.  Document Revised: 05/21/2020 Document Reviewed: 03/19/2018  Elsevier Patient Education © 2021 [employer] Inc.

## 2021-06-14 NOTE — PROGRESS NOTES
"Chief Complaint   Patient presents with   • EGD/Colonoscopy Performed 06/07/2021       Subjective    Nitin Jackson is a 40 y.o. male.    History of Present Illness  Patient presented to GI clinic for follow-up visit today.  Has continued symptoms with epigastric pain with minimal improvement.  Denied nausea or vomiting.  Bowel movements are regular.  Weight is stable.  EGD was consistent with esophagitis, gastritis and hiatal hernia.  Path was consistent with reactive gastropathy.  Colonoscopy was consistent with adenomatous colon polyps and diverticulosis.       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   • DJD (degenerative joint disease)     shoulder region, right side   • Dyspnea on exertion    • Elevated blood pressure    • Encounter for routine adult health examination    • Fatigue    • Hypertension     \"NOT TAKING MEDS THAT WERE PRESCRIBED\"   • Obstructive sleep apnea    • Obstructive sleep apnea syndrome 8/24/2016   • Pain in left knee    • Vertigo      Past Surgical History:   Procedure Laterality Date   • BREAST LUMPECTOMY WITH AXILLARY NODE DISSECTION Left 8/22/2017    Procedure: LEFT AXILLARY LYMPH NODE BIOPSYl;  Surgeon: Bryan Arnold MD;  Location: [facility] OR;  Service:    • COLONOSCOPY N/A 6/7/2021    Procedure: COLONOSCOPY;  Surgeon: Smitha Amaya MD;  Location: [facility] ENDOSCOPY;  Service: Gastroenterology;  Laterality: N/A;   • ENDOSCOPY N/A 6/7/2021    Procedure: ESOPHAGOGASTRODUODENOSCOPY;  Surgeon: Smitha Amaya MD;  Location: [facility] ENDOSCOPY;  Service: Gastroenterology;  Laterality: N/A;   • INJECTION OF MEDICATION  08/10/2016    Kenalog   • SHOULDER ARTHROSCOPY  01/13/2015    " Arthroscopy of shoulder, subacromial decompression, Reed, and injection of the shoulder with 80 ml Kenalog   • SHOULDER SURGERY     • UPPER GASTROINTESTINAL ENDOSCOPY  06/07/2021   • WISDOM TOOTH EXTRACTION      2 removed     Family History   Problem Relation Age of Onset   • Diabetes Other    • Heart disease Other    • Hypertension Other    • No Known Problems Mother    • No Known Problems Father    • No Known Problems Sister    • Heart block Maternal Grandmother    • Lung cancer Paternal Grandmother    • Lung cancer Paternal Grandfather    • No Known Problems Half-Brother    • No Known Problems Half-Brother    • No Known Problems Sister    • No Known Problems Daughter    • No Known Problems Daughter    • No Known Problems Son    • No Known Problems Son        Prior to Admission medications    Medication Sig Start Date End Date Taking? Authorizing Provider   albuterol sulfate  (90 Base) MCG/ACT inhaler Inhale 2 puffs Every 4 (Four) Hours As Needed for Wheezing or Shortness of Air. 6/7/21  Yes Jayro Sanchez III, MD   cetirizine (zyrTEC) 10 MG tablet Take 1 tablet by mouth Daily. 3/19/21  Yes Jayro Sanchez III, MD   famotidine (PEPCID) 20 MG tablet Take 2 tablets by mouth 2 (Two) Times a Day As Needed for Heartburn. 2/2/21  Yes Jayro Sanchez III, MD   indapamide (LOZOL) 1.25 MG tablet Take 1 tablet by mouth Every Morning. 3/19/21  Yes Jayro Sanchez III, MD   montelukast (SINGULAIR) 10 MG tablet Take 1 tablet by mouth Every Night. 3/19/21  Yes Jayro Sanchez III, MD   multivitamin-iron-minerals-folic acid (CENTRUM) chewable tablet Chew 1 tablet Daily.   Yes ProviderJaki MD   pantoprazole (PROTONIX) 40 MG EC tablet Take 1 tablet by mouth Daily. 3/19/21  Yes Jayro Sanchez III, MD   fluticasone (Flonase) 50 MCG/ACT nasal spray 2 sprays into the nostril(s) as directed by provider Daily for 30 days. 3/19/21 4/18/21  Jayro Sanchez III, MD   sucralfate (Carafate) 1 GM/10ML  suspension Take 10 mL by mouth 4 (Four) Times a Day. 6/14/21   Smitha Amaya MD     No Known Allergies  Social History     Socioeconomic History   • Marital status: Single     Spouse name: Not on file   • Number of children: Not on file   • Years of education: Not on file   • Highest education level: Not on file   Tobacco Use   • Smoking status: Current Some Day Smoker     Years: 20.00     Types: Cigarettes   • Smokeless tobacco: Never Used   • Tobacco comment: 06/14/2021 - Patient states he utilizes 3 Cigarettes per day on the days he smokes.   Vaping Use   • Vaping Use: Never used   Substance and Sexual Activity   • Alcohol use: Yes     Comment: 06/14/2021 - Patient states he partakes of alcoholic beverages socially.   • Drug use: Not Currently     Types: Marijuana   • Sexual activity: Defer       Review of Systems  Review of Systems   Constitutional: Negative for chills, fatigue, fever and unexpected weight change.   HENT: Negative for congestion, ear discharge, hearing loss, nosebleeds and sore throat.    Eyes: Negative for pain, discharge and redness.   Respiratory: Negative for cough, chest tightness, shortness of breath and wheezing.    Cardiovascular: Negative for chest pain and palpitations.   Gastrointestinal: Positive for abdominal pain. Negative for abdominal distention, blood in stool, constipation, diarrhea, nausea and vomiting.   Endocrine: Negative for cold intolerance, polydipsia, polyphagia and polyuria.   Genitourinary: Negative for dysuria, flank pain, frequency, hematuria and urgency.   Musculoskeletal: Negative for arthralgias, back pain, joint swelling and myalgias.   Skin: Negative for color change, pallor and rash.   Neurological: Negative for tremors, seizures, syncope, weakness and headaches.   Hematological: Negative for adenopathy. Does not bruise/bleed easily.   Psychiatric/Behavioral: Negative for behavioral problems, confusion, dysphoric mood, hallucinations and suicidal ideas.  "The patient is not nervous/anxious.         BP (!) 146/107   Pulse 82   Ht 177.8 cm (70\")   Wt 92.8 kg (204 lb 9.6 oz)   BMI 29.36 kg/m²     Objective    Physical Exam  Constitutional:       Appearance: He is well-developed.   HENT:      Head: Normocephalic and atraumatic.   Eyes:      Conjunctiva/sclera: Conjunctivae normal.      Pupils: Pupils are equal, round, and reactive to light.   Neck:      Thyroid: No thyromegaly.   Cardiovascular:      Rate and Rhythm: Normal rate and regular rhythm.      Heart sounds: Normal heart sounds. No murmur heard.     Pulmonary:      Effort: Pulmonary effort is normal.      Breath sounds: Normal breath sounds. No wheezing.   Abdominal:      General: Bowel sounds are normal. There is no distension.      Palpations: Abdomen is soft. There is no mass.      Tenderness: There is no abdominal tenderness.      Hernia: No hernia is present.   Genitourinary:     Comments: No lesions noted  Musculoskeletal:         General: No tenderness. Normal range of motion.      Cervical back: Normal range of motion and neck supple.   Lymphadenopathy:      Cervical: No cervical adenopathy.   Skin:     General: Skin is warm and dry.      Findings: No rash.   Neurological:      Mental Status: He is alert and oriented to person, place, and time.      Cranial Nerves: No cranial nerve deficit.   Psychiatric:         Thought Content: Thought content normal.       Admission on 06/07/2021, Discharged on 06/07/2021   Component Date Value Ref Range Status   • Case Report 06/07/2021    Final                    Value:Surgical Pathology Report                         Case: [id number]                                  Authorizing Provider:  Smitha Amaya MD      Collected:           06/07/2021 03:01 PM          Ordering Location:     [facility]             Received:            06/08/2021 06:46 AM                                 [facility]                                                     "   Pathologist:           Monico Dennis MD                                                       Specimens:   1) - Small Intestine, Duodenum, duodenum bx                                                         2) - Gastric, Antrum, antrum bx                                                                     3) - Esophagus, eg junction bx                                                                      4) - Small Intestine, Ileum, ti bx                                                                  5) - Large Intestine, colonic mucosa bx                                                                                       6) - Large Intestine, Right / Ascending Colon, ascending colon polyps X 2                 • Final Diagnosis 06/07/2021    Final                    Value:This result contains rich text formatting which cannot be displayed here.     Assessment/Plan      1. Epigastric pain    1.  Epigastric pain rule out pancreaticobiliary pathology.  Continue Protonix and Pepcid.  Add Carafate 1 g p.o. 4 times daily.    Obtain right upper quadrant sonogram.  2.  Abdominal pain with diarrhea, resolved.  Continue Bentyl.  3.  Colon polyps, repeat colonoscopy in 3 years  4.   Diverticulosis, add high-fiber diet.  5.  High BMI, recommend exercise and diet control.  6.  Tobacco usage, recommend cessation.      Orders placed during this encounter include:  Orders Placed This Encounter   Procedures   • US Gallbladder     Standing Status:   Future     Standing Expiration Date:   6/14/2022     Order Specific Question:   Reason for Exam:     Answer:   epigastric pain       * Surgery not found *    Review and/or summary of lab tests, radiology, procedures, medications. Review and summary of old records and obtaining of history. The risks and benefits of my recommendations, as well as other treatment options were discussed with the patient today. Questions were answered.    New Medications Ordered This Visit    Medications   • sucralfate (Carafate) 1 GM/10ML suspension     Sig: Take 10 mL by mouth 4 (Four) Times a Day.     Dispense:  4 g     Refill:  5       Follow-up: Return in about 1 month (around 7/14/2021).               Results for orders placed or performed during the hospital encounter of 06/07/21   Tissue Pathology Exam    Specimen: A: Small Intestine, Duodenum; Tissue    B: Gastric, Antrum; Tissue    C: Esophagus; Tissue    D: Small Intestine, Ileum; Tissue    E: Large Intestine; Tissue    F: Large Intestine, Right / Ascending Colon; Tissue   Result Value Ref Range    Case Report       Surgical Pathology Report                         Case: [id number]                                  Authorizing Provider:  Smitha Amaya MD      Collected:           06/07/2021 03:01 PM          Ordering Location:     [facility]             Received:            06/08/2021 06:46 AM                                 [place] [facility]                                                     Pathologist:           Monico Dennis MD                                                       Specimens:   1) - Small Intestine, Duodenum, duodenum bx                                                         2) - Gastric, Antrum, antrum bx                                                                     3) - Esophagus, eg junction bx                                                                      4) - Small Intestine, Ileum, ti bx                                                                  5) - Large Intestine, colonic mucosa bx                                                              6) - Large Intestine, Right / Ascending Colon, ascending colon polyps X 2                  Final Diagnosis       SEE SCANNED REPORT       Results for orders placed or performed in visit on 06/04/21   COVID-19, [facility] IN-HOUSE, NP SWAB IN TRANSPORT MEDIA 8-10 HR TAT - Swab, Nasopharynx    Specimen: Nasopharynx; Swab   Result Value Ref  Range    COVID19 Not Detected Not Detected - Ref. Range   Results for orders placed or performed in visit on 04/13/21   COVID-19, [facility] IN-HOUSE, NP SWAB IN TRANSPORT MEDIA 8-10 HR TAT - Swab, Nasopharynx    Specimen: Nasopharynx; Swab   Result Value Ref Range    COVID19 Not Detected Not Detected - Ref. Range   Results for orders placed or performed during the hospital encounter of 05/30/20   Urinalysis With Microscopic If Indicated (No Culture) - Urine, Clean Catch    Specimen: Urine, Clean Catch   Result Value Ref Range    Color, UA Yellow Yellow, Straw, Dark Yellow, Sherice    Appearance, UA Clear Clear    pH, UA 7.0 5.0 - 9.0    Specific Gravity, UA 1.020 1.003 - 1.030    Glucose, UA Negative Negative    Ketones, UA Negative Negative    Bilirubin, UA Negative Negative    Blood, UA Negative Negative    Protein, UA Negative Negative    Leuk Esterase, UA Negative Negative    Nitrite, UA Negative Negative    Urobilinogen, UA 1.0 E.U./dL 0.2 - 1.0 E.U./dL   CBC Auto Differential    Specimen: Blood   Result Value Ref Range    WBC 6.35 3.40 - 10.80 10*3/mm3    RBC 5.01 4.14 - 5.80 10*6/mm3    Hemoglobin 15.5 13.0 - 17.7 g/dL    Hematocrit 45.1 37.5 - 51.0 %    MCV 90.0 79.0 - 97.0 fL    MCH 30.9 26.6 - 33.0 pg    MCHC 34.4 31.5 - 35.7 g/dL    RDW 13.3 12.3 - 15.4 %    RDW-SD 43.8 37.0 - 54.0 fl    MPV 11.7 6.0 - 12.0 fL    Platelets 170 140 - 450 10*3/mm3    Neutrophil % 42.7 42.7 - 76.0 %    Lymphocyte % 39.2 19.6 - 45.3 %    Monocyte % 14.2 (H) 5.0 - 12.0 %    Eosinophil % 3.3 0.3 - 6.2 %    Basophil % 0.3 0.0 - 1.5 %    Immature Grans % 0.3 0.0 - 0.5 %    Neutrophils, Absolute 2.71 1.70 - 7.00 10*3/mm3    Lymphocytes, Absolute 2.49 0.70 - 3.10 10*3/mm3    Monocytes, Absolute 0.90 0.10 - 0.90 10*3/mm3    Eosinophils, Absolute 0.21 0.00 - 0.40 10*3/mm3    Basophils, Absolute 0.02 0.00 - 0.20 10*3/mm3    Immature Grans, Absolute 0.02 0.00 - 0.05 10*3/mm3    nRBC 0.0 0.0 - 0.2 /100 WBC   Troponin    Specimen: Blood      Result Value Ref Range    Troponin T <0.010 0.000 - 0.030 ng/mL   Troponin    Specimen: Blood   Result Value Ref Range    Troponin T <0.010 0.000 - 0.030 ng/mL   Urine Drug Screen - Urine, Clean Catch    Specimen: Urine, Clean Catch   Result Value Ref Range    THC, Screen, Urine Negative Negative    Phencyclidine (PCP), Urine Negative Negative    Cocaine Screen, Urine Negative Negative    Methamphetamine, Ur Negative Negative    Opiate Screen Negative Negative    Amphetamine Screen, Urine Negative Negative    Benzodiazepine Screen, Urine Negative Negative    Tricyclic Antidepressants Screen Negative Negative    Methadone Screen, Urine Negative Negative    Barbiturates Screen, Urine Negative Negative    Oxycodone Screen, Urine Negative Negative    Propoxyphene Screen Negative Negative    Buprenorphine, Screen, Urine Negative Negative   D-dimer, Quantitative    Specimen: Blood   Result Value Ref Range    D-Dimer, Quantitative <270 0 - 470 ng/mL (FEU)   BNP    Specimen: Blood   Result Value Ref Range    proBNP 15.0 5.0 - 450.0 pg/mL   Comprehensive Metabolic Panel    Specimen: Blood   Result Value Ref Range    Glucose 94 65 - 99 mg/dL    BUN 14 6 - 20 mg/dL    Creatinine 1.06 0.76 - 1.27 mg/dL    Sodium 135 (L) 136 - 145 mmol/L    Potassium 4.0 3.5 - 5.2 mmol/L    Chloride 105 98 - 107 mmol/L    CO2 23.0 22.0 - 29.0 mmol/L    Calcium 8.8 8.6 - 10.5 mg/dL    Total Protein 7.2 6.0 - 8.5 g/dL    Albumin 4.30 3.50 - 5.20 g/dL    ALT (SGPT) 23 1 - 41 U/L    AST (SGOT) 25 1 - 40 U/L    Alkaline Phosphatase 88 39 - 117 U/L    Total Bilirubin 0.5 0.2 - 1.2 mg/dL    eGFR  African Amer 93 >60 mL/min/1.73    Globulin 2.9 gm/dL    A/G Ratio 1.5 g/dL    BUN/Creatinine Ratio 13.2 7.0 - 25.0    Anion Gap 7.0 5.0 - 15.0 mmol/L   Results for orders placed or performed in visit on 06/03/19   CBC Auto Differential    Specimen: Blood   Result Value Ref Range    WBC 6.55 3.40 - 10.80 10*3/mm3    RBC 5.10 4.14 - 5.80 10*6/mm3     Hemoglobin 15.5 13.0 - 17.7 g/dL    Hematocrit 46.5 37.5 - 51.0 %    MCV 91.2 79.0 - 97.0 fL    MCH 30.4 26.6 - 33.0 pg    MCHC 33.3 31.5 - 35.7 g/dL    RDW 12.6 12.3 - 15.4 %    RDW-SD 41.8 37.0 - 54.0 fl    MPV 12.8 (H) 6.0 - 12.0 fL    Platelets 181 140 - 450 10*3/mm3    Neutrophil % 39.5 (L) 42.7 - 76.0 %    Lymphocyte % 40.5 19.6 - 45.3 %    Monocyte % 14.8 (H) 5.0 - 12.0 %    Eosinophil % 4.6 0.3 - 6.2 %    Basophil % 0.3 0.0 - 1.5 %    Immature Grans % 0.3 0.0 - 0.5 %    Neutrophils, Absolute 2.59 1.70 - 7.00 10*3/mm3    Lymphocytes, Absolute 2.65 0.70 - 3.10 10*3/mm3    Monocytes, Absolute 0.97 (H) 0.10 - 0.90 10*3/mm3    Eosinophils, Absolute 0.30 0.00 - 0.40 10*3/mm3    Basophils, Absolute 0.02 0.00 - 0.20 10*3/mm3    Immature Grans, Absolute 0.02 0.00 - 0.05 10*3/mm3    nRBC 0.0 0.0 - 0.2 /100 WBC     *Note: Due to a large number of results and/or encounters for the requested time period, some results have not been displayed. A complete set of results can be found in Results Review.         This document has been electronically signed by Smitha Amaya MD on June 14, 2021 14:37 CDT

## 2021-07-01 ENCOUNTER — APPOINTMENT (OUTPATIENT)
Dept: ULTRASOUND IMAGING | Facility: HOSPITAL | Age: 41
End: 2021-07-01

## 2021-07-08 ENCOUNTER — HOSPITAL ENCOUNTER (OUTPATIENT)
Dept: ULTRASOUND IMAGING | Facility: HOSPITAL | Age: 41
Discharge: HOME | End: 2021-07-08
Admitting: INTERNAL MEDICINE

## 2021-07-08 DIAGNOSIS — R10.13 EPIGASTRIC PAIN: ICD-10-CM

## 2021-07-08 PROCEDURE — 76705 ECHO EXAM OF ABDOMEN: CPT

## 2021-07-26 ENCOUNTER — OFFICE VISIT (OUTPATIENT)
Dept: GASTROENTEROLOGY | Facility: CLINIC | Age: 41
End: 2021-07-26

## 2021-07-26 VITALS
SYSTOLIC BLOOD PRESSURE: 146 MMHG | DIASTOLIC BLOOD PRESSURE: 103 MMHG | HEIGHT: 70 IN | HEART RATE: 72 BPM | WEIGHT: 203 LBS | BODY MASS INDEX: 29.06 KG/M2

## 2021-07-26 DIAGNOSIS — R10.13 EPIGASTRIC PAIN: Primary | ICD-10-CM

## 2021-07-26 PROCEDURE — 99214 OFFICE O/P EST MOD 30 MIN: CPT | Performed by: INTERNAL MEDICINE

## 2021-07-26 RX ORDER — DICYCLOMINE HCL 20 MG
20 TABLET ORAL EVERY 6 HOURS
Qty: 120 TABLET | Refills: 5 | Status: SHIPPED | OUTPATIENT
Start: 2021-07-26 | End: 2021-08-25

## 2021-07-26 NOTE — PATIENT INSTRUCTIONS
"BMI for Adults  What is BMI?  Body mass index (BMI) is a number that is calculated from a person's weight and height. BMI can help estimate how much of a person's weight is composed of fat. BMI does not measure body fat directly. Rather, it is an alternative to procedures that directly measure body fat, which can be difficult and expensive.  BMI can help identify people who may be at higher risk for certain medical problems.  What are BMI measurements used for?  BMI is used as a screening tool to identify possible weight problems. It helps determine whether a person is obese, overweight, a healthy weight, or underweight.  BMI is useful for:  · Identifying a weight problem that may be related to a medical condition or may increase the risk for medical problems.  · Promoting changes, such as changes in diet and exercise, to help reach a healthy weight. BMI screening can be repeated to see if these changes are working.  How is BMI calculated?  BMI involves measuring your weight in relation to your height. Both height and weight are measured, and the BMI is calculated from those numbers. This can be done either in English (U[place] or metric measurements. Note that charts and online BMI calculators are available to help you find your BMI quickly and easily without having to do these calculations yourself.  To calculate your BMI in English (U.S[place] measurements:    1. Measure your weight in pounds (lb).  2. Multiply the number of pounds by 703.  ? For example, for a person who weighs 180 lb, multiply that number by 703, which equals 126,540.  3. Measure your height in inches. Then multiply that number by itself to get a measurement called \"inches squared.\"  ? For example, for a person who is 70 inches tall, the \"inches squared\" measurement is 70 inches x 70 inches, which equals 4,900 inches squared.  4. Divide the total from step 2 (number of lb x 703) by the total from step 3 (inches squared): 126,540 ÷ 4,900 = 25.8. This is " "your BMI.  To calculate your BMI in metric measurements:  1. Measure your weight in kilograms (kg).  2. Measure your height in meters (m). Then multiply that number by itself to get a measurement called \"meters squared.\"  ? For example, for a person who is 1.75 m tall, the \"meters squared\" measurement is 1.75 m x 1.75 m, which is equal to 3.1 meters squared.  3. Divide the number of kilograms (your weight) by the meters squared number. In this example: 70 ÷ 3.1 = 22.6. This is your BMI.  What do the results mean?  BMI charts are used to identify whether you are underweight, normal weight, overweight, or obese. The following guidelines will be used:  · Underweight: BMI less than 18.5.  · Normal weight: BMI between 18.5 and 24.9.  · Overweight: BMI between 25 and 29.9.  · Obese: BMI of 30 or above.  Keep these notes in mind:  · Weight includes both fat and muscle, so someone with a muscular build, such as an athlete, may have a BMI that is higher than 24.9. In cases like these, BMI is not an accurate measure of body fat.  · To determine if excess body fat is the cause of a BMI of 25 or higher, further assessments may need to be done by a health care provider.  · BMI is usually interpreted in the same way for men and women.  Where to find more information  For more information about BMI, including tools to quickly calculate your BMI, go to these websites:  · Centers for Disease Control and Prevention: www.cdc.gov  · American Heart Association: www.heart.org  · National Heart, Lung, and [employer] [place]: www.nhlbi.nih.gov  Summary  · Body mass index (BMI) is a number that is calculated from a person's weight and height.  · BMI may help estimate how much of a person's weight is composed of fat. BMI can help identify those who may be at higher risk for certain medical problems.  · BMI can be measured using English measurements or metric measurements.  · BMI charts are used to identify whether you are underweight, normal " weight, overweight, or obese.  This information is not intended to replace advice given to you by your health care provider. Make sure you discuss any questions you have with your health care provider.  Document Revised: 09/09/2020 Document Reviewed: 07/17/2020  Elsevier Patient Education © 2021 [employer]

## 2021-07-26 NOTE — PROGRESS NOTES
"Chief Complaint   Patient presents with   • Hernia   • US gallbladder       Subjective    Nitin Jackson is a 41 y.o. male.    History of Present Illness  Patient presented to GI clinic for follow-up visit today.  Has continued symptoms of epigastric burning discomfort which is worse with spicy food and tomato-based food.  Denied nausea or vomiting. Bowel movements are regular.  Weight is stable.  Taking PPI daily. Right upper quadrant sonogram was unremarkable.         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   • DJD (degenerative joint disease)     shoulder region, right side   • Dyspnea on exertion    • Elevated blood pressure    • Encounter for routine adult health examination    • Fatigue    • Hypertension     \"NOT TAKING MEDS THAT WERE PRESCRIBED\"   • Obstructive sleep apnea    • Obstructive sleep apnea syndrome 8/24/2016   • Pain in left knee    • Vertigo      Past Surgical History:   Procedure Laterality Date   • BREAST LUMPECTOMY WITH AXILLARY NODE DISSECTION Left 8/22/2017    Procedure: LEFT AXILLARY LYMPH NODE BIOPSYl;  Surgeon: Bryan Arnold MD;  Location: [facility] OR;  Service:    • COLONOSCOPY N/A 6/7/2021    Procedure: COLONOSCOPY;  Surgeon: Smitha Amaya MD;  Location: [facility] ENDOSCOPY;  Service: Gastroenterology;  Laterality: N/A;   • ENDOSCOPY N/A 6/7/2021    Procedure: ESOPHAGOGASTRODUODENOSCOPY;  Surgeon: Smitha Amaya MD;  Location: [facility] ENDOSCOPY;  Service: Gastroenterology;  Laterality: N/A;   • INJECTION OF MEDICATION  08/10/2016    Kenalog   • SHOULDER ARTHROSCOPY  01/13/2015    Arthroscopy of shoulder, subacromial decompression, [place], and injection of the shoulder with 80 " asher Smith   • SHOULDER SURGERY     • UPPER GASTROINTESTINAL ENDOSCOPY  06/07/2021   • WISDOM TOOTH EXTRACTION      2 removed     Family History   Problem Relation Age of Onset   • Diabetes Other    • Heart disease Other    • Hypertension Other    • No Known Problems Mother    • No Known Problems Father    • No Known Problems Sister    • Heart block Maternal Grandmother    • Lung cancer Paternal Grandmother    • Lung cancer Paternal Grandfather    • No Known Problems Half-Brother    • No Known Problems Half-Brother    • No Known Problems Sister    • No Known Problems Daughter    • No Known Problems Daughter    • No Known Problems Son    • No Known Problems Son        Prior to Admission medications    Medication Sig Start Date End Date Taking? Authorizing Provider   albuterol sulfate  (90 Base) MCG/ACT inhaler Inhale 2 puffs Every 4 (Four) Hours As Needed for Wheezing or Shortness of Air. 6/7/21  Yes Jayro Sanchez III, MD   cetirizine (zyrTEC) 10 MG tablet Take 1 tablet by mouth Daily. 3/19/21  Yes Jayro Sanchez III, MD   famotidine (PEPCID) 20 MG tablet Take 2 tablets by mouth 2 (Two) Times a Day As Needed for Heartburn. 2/2/21  Yes Jayro Sanchez III, MD   indapamide (LOZOL) 1.25 MG tablet Take 1 tablet by mouth Every Morning. 3/19/21  Yes Jayro Sanchez III, MD   montelukast (SINGULAIR) 10 MG tablet Take 1 tablet by mouth Every Night. 3/19/21  Yes Jayro Sanchez III, MD   multivitamin-iron-minerals-folic acid (CENTRUM) chewable tablet Chew 1 tablet Daily.   Yes ProviderJaki MD   pantoprazole (PROTONIX) 40 MG EC tablet Take 1 tablet by mouth Daily. 3/19/21  Yes Jayro Sanchez III, MD   sucralfate (Carafate) 1 GM/10ML suspension Take 10 mL by mouth 4 (Four) Times a Day. 6/14/21  Yes Smitha Amaya MD   dicyclomine (BENTYL) 20 MG tablet Take 1 tablet by mouth Every 6 (Six) Hours for 30 days. 7/26/21 8/25/21  Smitha Amaya MD   fluticasone (Flonase) 50 MCG/ACT nasal  spray 2 sprays into the nostril(s) as directed by provider Daily for 30 days. 3/19/21 4/18/21  OJayro Bruno III, MD     No Known Allergies  Social History     Socioeconomic History   • Marital status: Single     Spouse name: Not on file   • Number of children: Not on file   • Years of education: Not on file   • Highest education level: Not on file   Tobacco Use   • Smoking status: Current Some Day Smoker     Years: 20.00     Types: Cigarettes   • Smokeless tobacco: Never Used   • Tobacco comment: 06/14/2021 - Patient states he utilizes 3 Cigarettes per day on the days he smokes.   Vaping Use   • Vaping Use: Never used   Substance and Sexual Activity   • Alcohol use: Yes     Comment: 06/14/2021 - Patient states he partakes of alcoholic beverages socially.   • Drug use: Not Currently     Types: Marijuana   • Sexual activity: Defer       Review of Systems  Review of Systems   Constitutional: Negative for chills, fatigue, fever and unexpected weight change.   HENT: Negative for congestion, ear discharge, hearing loss, nosebleeds and sore throat.    Eyes: Negative for pain, discharge and redness.   Respiratory: Negative for cough, chest tightness, shortness of breath and wheezing.    Cardiovascular: Negative for chest pain and palpitations.   Gastrointestinal: Positive for abdominal pain. Negative for abdominal distention, blood in stool, constipation, diarrhea, nausea and vomiting.   Endocrine: Negative for cold intolerance, polydipsia, polyphagia and polyuria.   Genitourinary: Negative for dysuria, flank pain, frequency, hematuria and urgency.   Musculoskeletal: Negative for arthralgias, back pain, joint swelling and myalgias.   Skin: Negative for color change, pallor and rash.   Neurological: Negative for tremors, seizures, syncope, weakness and headaches.   Hematological: Negative for adenopathy. Does not bruise/bleed easily.   Psychiatric/Behavioral: Negative for behavioral problems, confusion, dysphoric mood,  "hallucinations and suicidal ideas. The patient is not nervous/anxious.         BP (!) 146/103 (BP Location: Right arm)   Pulse 72   Ht 177.8 cm (70\")   Wt 92.1 kg (203 lb)   BMI 29.13 kg/m²     Objective    Physical Exam  Constitutional:       Appearance: He is well-developed.   HENT:      Head: Normocephalic and atraumatic.   Eyes:      Conjunctiva/sclera: Conjunctivae normal.      Pupils: Pupils are equal, round, and reactive to light.   Neck:      Thyroid: No thyromegaly.   Cardiovascular:      Rate and Rhythm: Normal rate and regular rhythm.      Heart sounds: Normal heart sounds. No murmur heard.     Pulmonary:      Effort: Pulmonary effort is normal.      Breath sounds: Normal breath sounds. No wheezing.   Abdominal:      General: Bowel sounds are normal. There is no distension.      Palpations: Abdomen is soft. There is no mass.      Tenderness: There is no abdominal tenderness.      Hernia: No hernia is present.   Genitourinary:     Comments: No lesions noted  Musculoskeletal:         General: No tenderness. Normal range of motion.      Cervical back: Normal range of motion and neck supple.   Lymphadenopathy:      Cervical: No cervical adenopathy.   Skin:     General: Skin is warm and dry.      Findings: No rash.   Neurological:      Mental Status: He is alert and oriented to person, place, and time.      Cranial Nerves: No cranial nerve deficit.   Psychiatric:         Thought Content: Thought content normal.       Admission on 06/07/2021, Discharged on 06/07/2021   Component Date Value Ref Range Status   • Case Report 06/07/2021    Final                    Value:Surgical Pathology Report                         Case: [id number]                                  Authorizing Provider:  Smitha Amaya MD      Collected:           06/07/2021 03:01 PM          Ordering Location:     [facility]             Received:            06/08/2021 06:46 AM                                 [facility] "                                                     Pathologist:           Monico Dennis MD                                                       Specimens:   1) - Small Intestine, Duodenum, duodenum bx                                                         2) - Gastric, Antrum, antrum bx                                                                     3) - Esophagus, eg junction bx                                                                      4) - Small Intestine, Ileum, ti bx                                                                  5) - Large Intestine, colonic mucosa bx                                                                                       6) - Large Intestine, Right / Ascending Colon, ascending colon polyps X 2                 • Final Diagnosis 06/07/2021    Final                    Value:This result contains rich text formatting which cannot be displayed here.     Assessment/Plan      1. Epigastric pain    1.  Epigastric pain rule out pancreaticobiliary pathology.  Continue Protonix and Pepcid.  Add Carafate 1 g p.o. 4 times daily.    Obtain HIDA scan for further evaluation.  2.  Abdominal pain with diarrhea, resolved.  Continue Bentyl.  3.  Colon polyps, repeat colonoscopy in 3 years  4.   Diverticulosis, add high-fiber diet.  5.  High BMI, recommend exercise and diet control.  6.  Tobacco usage, recommend cessation.         Orders placed during this encounter include:  Orders Placed This Encounter   Procedures   • NM Hepatobiliary Without CCK     Standing Status:   Future     Standing Expiration Date:   7/26/2022     Order Specific Question:   Release to patient     Answer:   Immediate       * Surgery not found *    Review and/or summary of lab tests, radiology, procedures, medications. Review and summary of old records and obtaining of history. The risks and benefits of my recommendations, as well as other treatment options were discussed with the patient today.  Questions were answered.    New Medications Ordered This Visit   Medications   • dicyclomine (BENTYL) 20 MG tablet     Sig: Take 1 tablet by mouth Every 6 (Six) Hours for 30 days.     Dispense:  120 tablet     Refill:  5       Follow-up: Return in about 1 month (around 8/26/2021).               Results for orders placed or performed during the hospital encounter of 06/07/21   Tissue Pathology Exam    Specimen: A: Small Intestine, Duodenum; Tissue    B: Gastric, Antrum; Tissue    C: Esophagus; Tissue    D: Small Intestine, Ileum; Tissue    E: Large Intestine; Tissue    F: Large Intestine, Right / Ascending Colon; Tissue   Result Value Ref Range    Case Report       Surgical Pathology Report                         Case: [id number]                                  Authorizing Provider:  Smitha Amaya MD      Collected:           06/07/2021 03:01 PM          Ordering Location:     [facility]             Received:            06/08/2021 06:46 AM                                 [place]                                                     Pathologist:           Monico Dennis MD                                                       Specimens:   1) - Small Intestine, Duodenum, duodenum bx                                                         2) - Gastric, Antrum, antrum bx                                                                     3) - Esophagus, eg junction bx                                                                      4) - Small Intestine, Ileum, ti bx                                                                  5) - Large Intestine, colonic mucosa bx                                                              6) - Large Intestine, Right / Ascending Colon, ascending colon polyps X 2                  Final Diagnosis       SEE SCANNED REPORT       Results for orders placed or performed in visit on 06/04/21   COVID-19, [facility] IN-HOUSE, NP SWAB IN TRANSPORT MEDIA 8-10 HR  TAT - Swab, Nasopharynx    Specimen: Nasopharynx; Swab   Result Value Ref Range    COVID19 Not Detected Not Detected - Ref. Range   Results for orders placed or performed in visit on 04/13/21   COVID-19, [facility] IN-HOUSE, NP SWAB IN TRANSPORT MEDIA 8-10 HR TAT - Swab, Nasopharynx    Specimen: Nasopharynx; Swab   Result Value Ref Range    COVID19 Not Detected Not Detected - Ref. Range   Results for orders placed or performed during the hospital encounter of 05/30/20   Urinalysis With Microscopic If Indicated (No Culture) - Urine, Clean Catch    Specimen: Urine, Clean Catch   Result Value Ref Range    Color, UA Yellow Yellow, Straw, Dark Yellow, Sherice    Appearance, UA Clear Clear    pH, UA 7.0 5.0 - 9.0    Specific Gravity, UA 1.020 1.003 - 1.030    Glucose, UA Negative Negative    Ketones, UA Negative Negative    Bilirubin, UA Negative Negative    Blood, UA Negative Negative    Protein, UA Negative Negative    Leuk Esterase, UA Negative Negative    Nitrite, UA Negative Negative    Urobilinogen, UA 1.0 E.U./dL 0.2 - 1.0 E.U./dL   CBC Auto Differential    Specimen: Blood   Result Value Ref Range    WBC 6.35 3.40 - 10.80 10*3/mm3    RBC 5.01 4.14 - 5.80 10*6/mm3    Hemoglobin 15.5 13.0 - 17.7 g/dL    Hematocrit 45.1 37.5 - 51.0 %    MCV 90.0 79.0 - 97.0 fL    MCH 30.9 26.6 - 33.0 pg    MCHC 34.4 31.5 - 35.7 g/dL    RDW 13.3 12.3 - 15.4 %    RDW-SD 43.8 37.0 - 54.0 fl    MPV 11.7 6.0 - 12.0 fL    Platelets 170 140 - 450 10*3/mm3    Neutrophil % 42.7 42.7 - 76.0 %    Lymphocyte % 39.2 19.6 - 45.3 %    Monocyte % 14.2 (H) 5.0 - 12.0 %    Eosinophil % 3.3 0.3 - 6.2 %    Basophil % 0.3 0.0 - 1.5 %    Immature Grans % 0.3 0.0 - 0.5 %    Neutrophils, Absolute 2.71 1.70 - 7.00 10*3/mm3    Lymphocytes, Absolute 2.49 0.70 - 3.10 10*3/mm3    Monocytes, Absolute 0.90 0.10 - 0.90 10*3/mm3    Eosinophils, Absolute 0.21 0.00 - 0.40 10*3/mm3    Basophils, Absolute 0.02 0.00 - 0.20 10*3/mm3    Immature Grans, Absolute 0.02 0.00 - 0.05  10*3/mm3    nRBC 0.0 0.0 - 0.2 /100 WBC   Troponin    Specimen: Blood   Result Value Ref Range    Troponin T <0.010 0.000 - 0.030 ng/mL   Troponin    Specimen: Blood   Result Value Ref Range    Troponin T <0.010 0.000 - 0.030 ng/mL   Urine Drug Screen - Urine, Clean Catch    Specimen: Urine, Clean Catch   Result Value Ref Range    THC, Screen, Urine Negative Negative    Phencyclidine (PCP), Urine Negative Negative    Cocaine Screen, Urine Negative Negative    Methamphetamine, Ur Negative Negative    Opiate Screen Negative Negative    Amphetamine Screen, Urine Negative Negative    Benzodiazepine Screen, Urine Negative Negative    Tricyclic Antidepressants Screen Negative Negative    Methadone Screen, Urine Negative Negative    Barbiturates Screen, Urine Negative Negative    Oxycodone Screen, Urine Negative Negative    Propoxyphene Screen Negative Negative    Buprenorphine, Screen, Urine Negative Negative   D-dimer, Quantitative    Specimen: Blood   Result Value Ref Range    D-Dimer, Quantitative <270 0 - 470 ng/mL (FEU)   BNP    Specimen: Blood   Result Value Ref Range    proBNP 15.0 5.0 - 450.0 pg/mL   Comprehensive Metabolic Panel    Specimen: Blood   Result Value Ref Range    Glucose 94 65 - 99 mg/dL    BUN 14 6 - 20 mg/dL    Creatinine 1.06 0.76 - 1.27 mg/dL    Sodium 135 (L) 136 - 145 mmol/L    Potassium 4.0 3.5 - 5.2 mmol/L    Chloride 105 98 - 107 mmol/L    CO2 23.0 22.0 - 29.0 mmol/L    Calcium 8.8 8.6 - 10.5 mg/dL    Total Protein 7.2 6.0 - 8.5 g/dL    Albumin 4.30 3.50 - 5.20 g/dL    ALT (SGPT) 23 1 - 41 U/L    AST (SGOT) 25 1 - 40 U/L    Alkaline Phosphatase 88 39 - 117 U/L    Total Bilirubin 0.5 0.2 - 1.2 mg/dL    eGFR  African Amer 93 >60 mL/min/1.73    Globulin 2.9 gm/dL    A/G Ratio 1.5 g/dL    BUN/Creatinine Ratio 13.2 7.0 - 25.0    Anion Gap 7.0 5.0 - 15.0 mmol/L   Results for orders placed or performed in visit on 06/03/19   CBC Auto Differential    Specimen: Blood   Result Value Ref Range    WBC  6.55 3.40 - 10.80 10*3/mm3    RBC 5.10 4.14 - 5.80 10*6/mm3    Hemoglobin 15.5 13.0 - 17.7 g/dL    Hematocrit 46.5 37.5 - 51.0 %    MCV 91.2 79.0 - 97.0 fL    MCH 30.4 26.6 - 33.0 pg    MCHC 33.3 31.5 - 35.7 g/dL    RDW 12.6 12.3 - 15.4 %    RDW-SD 41.8 37.0 - 54.0 fl    MPV 12.8 (H) 6.0 - 12.0 fL    Platelets 181 140 - 450 10*3/mm3    Neutrophil % 39.5 (L) 42.7 - 76.0 %    Lymphocyte % 40.5 19.6 - 45.3 %    Monocyte % 14.8 (H) 5.0 - 12.0 %    Eosinophil % 4.6 0.3 - 6.2 %    Basophil % 0.3 0.0 - 1.5 %    Immature Grans % 0.3 0.0 - 0.5 %    Neutrophils, Absolute 2.59 1.70 - 7.00 10*3/mm3    Lymphocytes, Absolute 2.65 0.70 - 3.10 10*3/mm3    Monocytes, Absolute 0.97 (H) 0.10 - 0.90 10*3/mm3    Eosinophils, Absolute 0.30 0.00 - 0.40 10*3/mm3    Basophils, Absolute 0.02 0.00 - 0.20 10*3/mm3    Immature Grans, Absolute 0.02 0.00 - 0.05 10*3/mm3    nRBC 0.0 0.0 - 0.2 /100 WBC     *Note: Due to a large number of results and/or encounters for the requested time period, some results have not been displayed. A complete set of results can be found in Results Review.         This document has been electronically signed by Smitha Amaya MD on July 26, 2021 12:57 CDT

## 2021-08-04 ENCOUNTER — HOSPITAL ENCOUNTER (OUTPATIENT)
Dept: NUCLEAR MEDICINE | Facility: HOSPITAL | Age: 41
Discharge: HOME | End: 2021-08-04

## 2021-08-04 DIAGNOSIS — R10.13 EPIGASTRIC PAIN: ICD-10-CM

## 2021-08-04 PROCEDURE — 78226 HEPATOBILIARY SYSTEM IMAGING: CPT

## 2021-08-04 PROCEDURE — A9537 TC99M MEBROFENIN: HCPCS | Performed by: INTERNAL MEDICINE

## 2021-08-04 PROCEDURE — 0 TECHNETIUM TC 99M MEBROFENIN KIT: Performed by: INTERNAL MEDICINE

## 2021-08-04 RX ORDER — KIT FOR THE PREPARATION OF TECHNETIUM TC 99M MEBROFENIN 45 MG/10ML
1 INJECTION, POWDER, LYOPHILIZED, FOR SOLUTION INTRAVENOUS
Status: COMPLETED | OUTPATIENT
Start: 2021-08-04 | End: 2021-08-04

## 2021-08-04 RX ADMIN — MEBROFENIN 1 DOSE: 45 INJECTION, POWDER, LYOPHILIZED, FOR SOLUTION INTRAVENOUS at 12:51

## 2021-08-09 ENCOUNTER — OFFICE VISIT (OUTPATIENT)
Dept: GASTROENTEROLOGY | Facility: CLINIC | Age: 41
End: 2021-08-09

## 2021-08-09 VITALS
HEART RATE: 76 BPM | DIASTOLIC BLOOD PRESSURE: 108 MMHG | HEIGHT: 70 IN | WEIGHT: 201 LBS | BODY MASS INDEX: 28.77 KG/M2 | SYSTOLIC BLOOD PRESSURE: 141 MMHG

## 2021-08-09 DIAGNOSIS — R10.13 EPIGASTRIC PAIN: Primary | ICD-10-CM

## 2021-08-09 PROCEDURE — 99213 OFFICE O/P EST LOW 20 MIN: CPT | Performed by: INTERNAL MEDICINE

## 2021-08-09 NOTE — PATIENT INSTRUCTIONS
"BMI for Adults  What is BMI?  Body mass index (BMI) is a number that is calculated from a person's weight and height. BMI can help estimate how much of a person's weight is composed of fat. BMI does not measure body fat directly. Rather, it is an alternative to procedures that directly measure body fat, which can be difficult and expensive.  BMI can help identify people who may be at higher risk for certain medical problems.  What are BMI measurements used for?  BMI is used as a screening tool to identify possible weight problems. It helps determine whether a person is obese, overweight, a healthy weight, or underweight.  BMI is useful for:  · Identifying a weight problem that may be related to a medical condition or may increase the risk for medical problems.  · Promoting changes, such as changes in diet and exercise, to help reach a healthy weight. BMI screening can be repeated to see if these changes are working.  How is BMI calculated?  BMI involves measuring your weight in relation to your height. Both height and weight are measured, and the BMI is calculated from those numbers. This can be done either in English (U[place] or metric measurements. Note that charts and online BMI calculators are available to help you find your BMI quickly and easily without having to do these calculations yourself.  To calculate your BMI in English (U.S.) measurements:    1. Measure your weight in pounds (lb).  2. Multiply the number of pounds by 703.  ? For example, for a person who weighs 180 lb, multiply that number by 703, which equals 126,540.  3. Measure your height in inches. Then multiply that number by itself to get a measurement called \"inches squared.\"  ? For example, for a person who is 70 inches tall, the \"inches squared\" measurement is 70 inches x 70 inches, which equals 4,900 inches squared.  4. Divide the total from step 2 (number of lb x 703) by the total from step 3 (inches squared): 126,540 ÷ 4,900 = 25.8. This is " "your BMI.  To calculate your BMI in metric measurements:  1. Measure your weight in kilograms (kg).  2. Measure your height in meters (m). Then multiply that number by itself to get a measurement called \"meters squared.\"  ? For example, for a person who is 1.75 m tall, the \"meters squared\" measurement is 1.75 m x 1.75 m, which is equal to 3.1 meters squared.  3. Divide the number of kilograms (your weight) by the meters squared number. In this example: 70 ÷ 3.1 = 22.6. This is your BMI.  What do the results mean?  BMI charts are used to identify whether you are underweight, normal weight, overweight, or obese. The following guidelines will be used:  · Underweight: BMI less than 18.5.  · Normal weight: BMI between 18.5 and 24.9.  · Overweight: BMI between 25 and 29.9.  · Obese: BMI of 30 or above.  Keep these notes in mind:  · Weight includes both fat and muscle, so someone with a muscular build, such as an athlete, may have a BMI that is higher than 24.9. In cases like these, BMI is not an accurate measure of body fat.  · To determine if excess body fat is the cause of a BMI of 25 or higher, further assessments may need to be done by a health care provider.  · BMI is usually interpreted in the same way for men and women.  Where to find more information  For more information about BMI, including tools to quickly calculate your BMI, go to these websites:  · Centers for Disease Control and Prevention: www.cdc.gov  · American Heart Association: www.heart.org  · National Heart, Lung, and Blood [place]: www.nhlbi.nih.gov  Summary  · Body mass index (BMI) is a number that is calculated from a person's weight and height.  · BMI may help estimate how much of a person's weight is composed of fat. BMI can help identify those who may be at higher risk for certain medical problems.  · BMI can be measured using English measurements or metric measurements.  · BMI charts are used to identify whether you are underweight, normal " weight, overweight, or obese.  This information is not intended to replace advice given to you by your health care provider. Make sure you discuss any questions you have with your health care provider.  Document Revised: 09/09/2020 Document Reviewed: 07/17/2020  Elsevier Patient Education © 2021 [employer]

## 2021-08-10 NOTE — PROGRESS NOTES
"Chief Complaint   Patient presents with   • f/u after hida scan       Subjective    Nitin Jackson is a 41 y.o. male.    History of Present Illness  Patient presented to GI clinic for follow-up visit today.  He feels better currently.  Abdominal pain has improved.  Denied nausea or vomiting.  Bowel movements are regular.  Weight is stable.  HIDA scan was unremarkable.       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   • DJD (degenerative joint disease)     shoulder region, right side   • Dyspnea on exertion    • Elevated blood pressure    • Encounter for routine adult health examination    • Fatigue    • Hypertension     \"NOT TAKING MEDS THAT WERE PRESCRIBED\"   • Obstructive sleep apnea    • Obstructive sleep apnea syndrome 8/24/2016   • Pain in left knee    • Vertigo      Past Surgical History:   Procedure Laterality Date   • BREAST LUMPECTOMY WITH AXILLARY NODE DISSECTION Left 8/22/2017    Procedure: LEFT AXILLARY LYMPH NODE BIOPSYl;  Surgeon: Bryan Arnold MD;  Location: [facility] OR;  Service:    • COLONOSCOPY N/A 6/7/2021    Procedure: COLONOSCOPY;  Surgeon: Smitha Amaya MD;  Location: [facility] ENDOSCOPY;  Service: Gastroenterology;  Laterality: N/A;   • ENDOSCOPY N/A 6/7/2021    Procedure: ESOPHAGOGASTRODUODENOSCOPY;  Surgeon: Smitha Amaya MD;  Location: [facility] ENDOSCOPY;  Service: Gastroenterology;  Laterality: N/A;   • INJECTION OF MEDICATION  08/10/2016    Kenalog   • SHOULDER ARTHROSCOPY  01/13/2015    Arthroscopy of shoulder, subacromial decompression, Simpson, and injection of the shoulder with 80 ml Kenalog   • SHOULDER SURGERY     • UPPER GASTROINTESTINAL ENDOSCOPY  06/07/2021   • WISDOM " TOOTH EXTRACTION      2 removed     Family History   Problem Relation Age of Onset   • Diabetes Other    • Heart disease Other    • Hypertension Other    • No Known Problems Mother    • No Known Problems Father    • No Known Problems Sister    • Heart block Maternal Grandmother    • Lung cancer Paternal Grandmother    • Lung cancer Paternal Grandfather    • No Known Problems Half-Brother    • No Known Problems Half-Brother    • No Known Problems Sister    • No Known Problems Daughter    • No Known Problems Daughter    • No Known Problems Son    • No Known Problems Son        Prior to Admission medications    Medication Sig Start Date End Date Taking? Authorizing Provider   albuterol sulfate  (90 Base) MCG/ACT inhaler Inhale 2 puffs Every 4 (Four) Hours As Needed for Wheezing or Shortness of Air. 6/7/21  Yes Jayro Sanchez III, MD   cetirizine (zyrTEC) 10 MG tablet Take 1 tablet by mouth Daily. 3/19/21  Yes Jayro Sanchez III, MD   dicyclomine (BENTYL) 20 MG tablet Take 1 tablet by mouth Every 6 (Six) Hours for 30 days. 7/26/21 8/25/21 Yes Smitha Amaya MD   famotidine (PEPCID) 20 MG tablet Take 2 tablets by mouth 2 (Two) Times a Day As Needed for Heartburn. 2/2/21  Yes Jayro Sanchez III, MD   indapamide (LOZOL) 1.25 MG tablet Take 1 tablet by mouth Every Morning. 3/19/21  Yes Jayro Sanchez III, MD   montelukast (SINGULAIR) 10 MG tablet Take 1 tablet by mouth Every Night. 3/19/21  Yes Jayro Sanchez III, MD   multivitamin-iron-minerals-folic acid (CENTRUM) chewable tablet Chew 1 tablet Daily.   Yes Jaki Noland MD   pantoprazole (PROTONIX) 40 MG EC tablet Take 1 tablet by mouth Daily. 3/19/21  Yes Jayro Sanchez III, MD   sucralfate (Carafate) 1 GM/10ML suspension Take 10 mL by mouth 4 (Four) Times a Day. 6/14/21  Yes Smitha Amaya MD   fluticasone (Flonase) 50 MCG/ACT nasal spray 2 sprays into the nostril(s) as directed by provider Daily for 30 days. 3/19/21  4/18/21  Jayro Sanchez III, MD     No Known Allergies  Social History     Socioeconomic History   • Marital status:      Spouse name: Not on file   • Number of children: Not on file   • Years of education: Not on file   • Highest education level: Not on file   Tobacco Use   • Smoking status: Current Some Day Smoker     Years: 20.00     Types: Cigarettes   • Smokeless tobacco: Never Used   • Tobacco comment: 06/14/2021 - Patient states he utilizes 3 Cigarettes per day on the days he smokes.   Vaping Use   • Vaping Use: Never used   Substance and Sexual Activity   • Alcohol use: Yes     Comment: 06/14/2021 - Patient states he partakes of alcoholic beverages socially.   • Drug use: Not Currently     Types: Marijuana   • Sexual activity: Defer       Review of Systems  Review of Systems   Constitutional: Negative for chills, fatigue, fever and unexpected weight change.   HENT: Negative for congestion, ear discharge, hearing loss, nosebleeds and sore throat.    Eyes: Negative for pain, discharge and redness.   Respiratory: Negative for cough, chest tightness, shortness of breath and wheezing.    Cardiovascular: Negative for chest pain and palpitations.   Gastrointestinal: Negative for abdominal distention, abdominal pain, blood in stool, constipation, diarrhea, nausea and vomiting.   Endocrine: Negative for cold intolerance, polydipsia, polyphagia and polyuria.   Genitourinary: Negative for dysuria, flank pain, frequency, hematuria and urgency.   Musculoskeletal: Negative for arthralgias, back pain, joint swelling and myalgias.   Skin: Negative for color change, pallor and rash.   Neurological: Negative for tremors, seizures, syncope, weakness and headaches.   Hematological: Negative for adenopathy. Does not bruise/bleed easily.   Psychiatric/Behavioral: Negative for behavioral problems, confusion, dysphoric mood, hallucinations and suicidal ideas. The patient is not nervous/anxious.         BP (!) 141/108  "(BP Location: Left arm)   Pulse 76   Ht 177.8 cm (70\")   Wt 91.2 kg (201 lb)   BMI 28.84 kg/m²     Objective    Physical Exam  Constitutional:       Appearance: He is well-developed.   HENT:      Head: Normocephalic and atraumatic.   Eyes:      Conjunctiva/sclera: Conjunctivae normal.      Pupils: Pupils are equal, round, and reactive to light.   Neck:      Thyroid: No thyromegaly.   Cardiovascular:      Rate and Rhythm: Normal rate and regular rhythm.      Heart sounds: Normal heart sounds. No murmur heard.     Pulmonary:      Effort: Pulmonary effort is normal.      Breath sounds: Normal breath sounds. No wheezing.   Abdominal:      General: Bowel sounds are normal. There is no distension.      Palpations: Abdomen is soft. There is no mass.      Tenderness: There is no abdominal tenderness.      Hernia: No hernia is present.   Genitourinary:     Comments: No lesions noted  Musculoskeletal:         General: No tenderness. Normal range of motion.      Cervical back: Normal range of motion and neck supple.   Lymphadenopathy:      Cervical: No cervical adenopathy.   Skin:     General: Skin is warm and dry.      Findings: No rash.   Neurological:      Mental Status: He is alert and oriented to person, place, and time.      Cranial Nerves: No cranial nerve deficit.   Psychiatric:         Thought Content: Thought content normal.       Admission on 06/07/2021, Discharged on 06/07/2021   Component Date Value Ref Range Status   • Case Report 06/07/2021    Final                    Value:Surgical Pathology Report                         Case: [id number]                                  Authorizing Provider:  Smitha Amaya MD      Collected:           06/07/2021 03:01 PM          Ordering Location:     [facility]             Received:            06/08/2021 06:46 AM                                 [place] [facility]                                                     Pathologist:           Monico Dennis" MD                                                       Specimens:   1) - Small Intestine, Duodenum, duodenum bx                                                         2) - Gastric, Antrum, antrum bx                                                                     3) - Esophagus, eg junction bx                                                                      4) - Small Intestine, Ileum, ti bx                                                                  5) - Large Intestine, colonic mucosa bx                                                                                       6) - Large Intestine, Right / Ascending Colon, ascending colon polyps X 2                 • Final Diagnosis 06/07/2021    Final                    Value:This result contains rich text formatting which cannot be displayed here.     Assessment/Plan    No diagnosis found..   1.  Epigastric pain, resolved..  Continue Protonix and Pepcid.  Continue Carafate 1 g p.o. 4 times daily.   2.  Abdominal pain with diarrhea, resolved.  Continue Bentyl.  3.  Colon polyps, repeat colonoscopy in 3 years  4.   Diverticulosis, add high-fiber diet.  5.  High BMI, recommend exercise and diet control.  6.  Tobacco usage, recommend cessation.    Orders placed during this encounter include:  No orders of the defined types were placed in this encounter.      * Surgery not found *    Review and/or summary of lab tests, radiology, procedures, medications. Review and summary of old records and obtaining of history. The risks and benefits of my recommendations, as well as other treatment options were discussed with the patient today. Questions were answered.    No orders of the defined types were placed in this encounter.      Follow-up: Return in about 3 months (around 11/9/2021).               Results for orders placed or performed during the hospital encounter of 06/07/21   Tissue Pathology Exam    Specimen: A: Small Intestine, Duodenum; Tissue    B:  Gastric, Antrum; Tissue    C: Esophagus; Tissue    D: Small Intestine, Ileum; Tissue    E: Large Intestine; Tissue    F: Large Intestine, Right / Ascending Colon; Tissue   Result Value Ref Range    Case Report       Surgical Pathology Report                         Case: [id number]                                  Authorizing Provider:  Smitha Amaya MD      Collected:           06/07/2021 03:01 PM          Ordering Location:     [facility]             Received:            06/08/2021 06:46 AM                                 [place] [facility]                                                     Pathologist:           Monico Dennis MD                                                       Specimens:   1) - Small Intestine, Duodenum, duodenum bx                                                         2) - Gastric, Antrum, antrum bx                                                                     3) - Esophagus, eg junction bx                                                                      4) - Small Intestine, Ileum, ti bx                                                                  5) - Large Intestine, colonic mucosa bx                                                              6) - Large Intestine, Right / Ascending Colon, ascending colon polyps X 2                  Final Diagnosis       SEE SCANNED REPORT       Results for orders placed or performed in visit on 06/04/21   COVID-19, [facility] IN-HOUSE, NP SWAB IN TRANSPORT MEDIA 8-10 HR TAT - Swab, Nasopharynx    Specimen: Nasopharynx; Swab   Result Value Ref Range    COVID19 Not Detected Not Detected - Ref. Range   Results for orders placed or performed in visit on 04/13/21   COVID-19, [facility] IN-HOUSE, NP SWAB IN TRANSPORT MEDIA 8-10 HR TAT - Swab, Nasopharynx    Specimen: Nasopharynx; Swab   Result Value Ref Range    COVID19 Not Detected Not Detected - Ref. Range   Results for orders placed or performed during the hospital encounter  of 05/30/20   Urinalysis With Microscopic If Indicated (No Culture) - Urine, Clean Catch    Specimen: Urine, Clean Catch   Result Value Ref Range    Color, UA Yellow Yellow, Straw, Dark Yellow, Sherice    Appearance, UA Clear Clear    pH, UA 7.0 5.0 - 9.0    Specific Gravity, UA 1.020 1.003 - 1.030    Glucose, UA Negative Negative    Ketones, UA Negative Negative    Bilirubin, UA Negative Negative    Blood, UA Negative Negative    Protein, UA Negative Negative    Leuk Esterase, UA Negative Negative    Nitrite, UA Negative Negative    Urobilinogen, UA 1.0 E.U./dL 0.2 - 1.0 E.U./dL   CBC Auto Differential    Specimen: Blood   Result Value Ref Range    WBC 6.35 3.40 - 10.80 10*3/mm3    RBC 5.01 4.14 - 5.80 10*6/mm3    Hemoglobin 15.5 13.0 - 17.7 g/dL    Hematocrit 45.1 37.5 - 51.0 %    MCV 90.0 79.0 - 97.0 fL    MCH 30.9 26.6 - 33.0 pg    MCHC 34.4 31.5 - 35.7 g/dL    RDW 13.3 12.3 - 15.4 %    RDW-SD 43.8 37.0 - 54.0 fl    MPV 11.7 6.0 - 12.0 fL    Platelets 170 140 - 450 10*3/mm3    Neutrophil % 42.7 42.7 - 76.0 %    Lymphocyte % 39.2 19.6 - 45.3 %    Monocyte % 14.2 (H) 5.0 - 12.0 %    Eosinophil % 3.3 0.3 - 6.2 %    Basophil % 0.3 0.0 - 1.5 %    Immature Grans % 0.3 0.0 - 0.5 %    Neutrophils, Absolute 2.71 1.70 - 7.00 10*3/mm3    Lymphocytes, Absolute 2.49 0.70 - 3.10 10*3/mm3    Monocytes, Absolute 0.90 0.10 - 0.90 10*3/mm3    Eosinophils, Absolute 0.21 0.00 - 0.40 10*3/mm3    Basophils, Absolute 0.02 0.00 - 0.20 10*3/mm3    Immature Grans, Absolute 0.02 0.00 - 0.05 10*3/mm3    nRBC 0.0 0.0 - 0.2 /100 WBC   Troponin    Specimen: Blood   Result Value Ref Range    Troponin T <0.010 0.000 - 0.030 ng/mL   Troponin    Specimen: Blood   Result Value Ref Range    Troponin T <0.010 0.000 - 0.030 ng/mL   Urine Drug Screen - Urine, Clean Catch    Specimen: Urine, Clean Catch   Result Value Ref Range    THC, Screen, Urine Negative Negative    Phencyclidine (PCP), Urine Negative Negative    Cocaine Screen, Urine Negative  Negative    Methamphetamine, Ur Negative Negative    Opiate Screen Negative Negative    Amphetamine Screen, Urine Negative Negative    Benzodiazepine Screen, Urine Negative Negative    Tricyclic Antidepressants Screen Negative Negative    Methadone Screen, Urine Negative Negative    Barbiturates Screen, Urine Negative Negative    Oxycodone Screen, Urine Negative Negative    Propoxyphene Screen Negative Negative    Buprenorphine, Screen, Urine Negative Negative   D-dimer, Quantitative    Specimen: Blood   Result Value Ref Range    D-Dimer, Quantitative <270 0 - 470 ng/mL (FEU)   BNP    Specimen: Blood   Result Value Ref Range    proBNP 15.0 5.0 - 450.0 pg/mL   Comprehensive Metabolic Panel    Specimen: Blood   Result Value Ref Range    Glucose 94 65 - 99 mg/dL    BUN 14 6 - 20 mg/dL    Creatinine 1.06 0.76 - 1.27 mg/dL    Sodium 135 (L) 136 - 145 mmol/L    Potassium 4.0 3.5 - 5.2 mmol/L    Chloride 105 98 - 107 mmol/L    CO2 23.0 22.0 - 29.0 mmol/L    Calcium 8.8 8.6 - 10.5 mg/dL    Total Protein 7.2 6.0 - 8.5 g/dL    Albumin 4.30 3.50 - 5.20 g/dL    ALT (SGPT) 23 1 - 41 U/L    AST (SGOT) 25 1 - 40 U/L    Alkaline Phosphatase 88 39 - 117 U/L    Total Bilirubin 0.5 0.2 - 1.2 mg/dL    eGFR  African Amer 93 >60 mL/min/1.73    Globulin 2.9 gm/dL    A/G Ratio 1.5 g/dL    BUN/Creatinine Ratio 13.2 7.0 - 25.0    Anion Gap 7.0 5.0 - 15.0 mmol/L   Results for orders placed or performed in visit on 06/03/19   CBC Auto Differential    Specimen: Blood   Result Value Ref Range    WBC 6.55 3.40 - 10.80 10*3/mm3    RBC 5.10 4.14 - 5.80 10*6/mm3    Hemoglobin 15.5 13.0 - 17.7 g/dL    Hematocrit 46.5 37.5 - 51.0 %    MCV 91.2 79.0 - 97.0 fL    MCH 30.4 26.6 - 33.0 pg    MCHC 33.3 31.5 - 35.7 g/dL    RDW 12.6 12.3 - 15.4 %    RDW-SD 41.8 37.0 - 54.0 fl    MPV 12.8 (H) 6.0 - 12.0 fL    Platelets 181 140 - 450 10*3/mm3    Neutrophil % 39.5 (L) 42.7 - 76.0 %    Lymphocyte % 40.5 19.6 - 45.3 %    Monocyte % 14.8 (H) 5.0 - 12.0 %     Eosinophil % 4.6 0.3 - 6.2 %    Basophil % 0.3 0.0 - 1.5 %    Immature Grans % 0.3 0.0 - 0.5 %    Neutrophils, Absolute 2.59 1.70 - 7.00 10*3/mm3    Lymphocytes, Absolute 2.65 0.70 - 3.10 10*3/mm3    Monocytes, Absolute 0.97 (H) 0.10 - 0.90 10*3/mm3    Eosinophils, Absolute 0.30 0.00 - 0.40 10*3/mm3    Basophils, Absolute 0.02 0.00 - 0.20 10*3/mm3    Immature Grans, Absolute 0.02 0.00 - 0.05 10*3/mm3    nRBC 0.0 0.0 - 0.2 /100 WBC     *Note: Due to a large number of results and/or encounters for the requested time period, some results have not been displayed. A complete set of results can be found in Results Review.         This document has been electronically signed by Smitha Amaya MD on August 9, 2021 22:18 CDT

## 2021-11-09 ENCOUNTER — OFFICE VISIT (OUTPATIENT)
Dept: GASTROENTEROLOGY | Facility: CLINIC | Age: 41
End: 2021-11-09

## 2021-11-09 VITALS
WEIGHT: 200.6 LBS | SYSTOLIC BLOOD PRESSURE: 152 MMHG | HEART RATE: 86 BPM | HEIGHT: 70 IN | BODY MASS INDEX: 28.72 KG/M2 | DIASTOLIC BLOOD PRESSURE: 110 MMHG

## 2021-11-09 DIAGNOSIS — R10.13 EPIGASTRIC PAIN: Primary | ICD-10-CM

## 2021-11-09 PROCEDURE — 99213 OFFICE O/P EST LOW 20 MIN: CPT | Performed by: INTERNAL MEDICINE

## 2021-11-09 RX ORDER — CHLORTHALIDONE 25 MG/1
12.5 TABLET ORAL DAILY
COMMUNITY
End: 2022-12-08 | Stop reason: SDUPTHER

## 2021-11-09 NOTE — PROGRESS NOTES
"Chief Complaint   Patient presents with   • Abdominal Pain     3 Month Clinical Appointment       Subjective    Nitin Jackson is a 41 y.o. male.    History of Present Illness  Patient presented to GI clinic for follow-up visit today.  He feels better currently.  No GI complaints at this time.  Abdominal pain has improved.  GERD is well controlled.  Has elevated blood pressure.       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   • DJD (degenerative joint disease)     shoulder region, right side   • Dyspnea on exertion    • Elevated blood pressure    • Encounter for routine adult health examination    • Fatigue    • Hypertension     \"NOT TAKING MEDS THAT WERE PRESCRIBED\"   • Obstructive sleep apnea    • Obstructive sleep apnea syndrome 8/24/2016   • Pain in left knee    • Vertigo      Past Surgical History:   Procedure Laterality Date   • BREAST LUMPECTOMY WITH AXILLARY NODE DISSECTION Left 8/22/2017    Procedure: LEFT AXILLARY LYMPH NODE BIOPSYl;  Surgeon: Bryan Arnold MD;  Location: [facility] OR;  Service:    • COLONOSCOPY N/A 6/7/2021    Procedure: COLONOSCOPY;  Surgeon: Smitha Amaya MD;  Location: [facility] ENDOSCOPY;  Service: Gastroenterology;  Laterality: N/A;   • ENDOSCOPY N/A 6/7/2021    Procedure: ESOPHAGOGASTRODUODENOSCOPY;  Surgeon: Smitha Amaya MD;  Location: [facility] ENDOSCOPY;  Service: Gastroenterology;  Laterality: N/A;   • INJECTION OF MEDICATION  08/10/2016    Kenalog   • SHOULDER ARTHROSCOPY  01/13/2015    Arthroscopy of shoulder, subacromial decompression, [place], and injection of the shoulder with 80 ml Kenalog   • SHOULDER SURGERY     • UPPER GASTROINTESTINAL ENDOSCOPY  06/07/2021   • " WISDOM TOOTH EXTRACTION      2 removed     Family History   Problem Relation Age of Onset   • Diabetes Other    • Heart disease Other    • Hypertension Other    • No Known Problems Mother    • No Known Problems Father    • No Known Problems Sister    • Heart block Maternal Grandmother    • Lung cancer Paternal Grandmother    • Lung cancer Paternal Grandfather    • No Known Problems Half-Brother    • No Known Problems Half-Brother    • No Known Problems Sister    • No Known Problems Daughter    • No Known Problems Daughter    • No Known Problems Son    • No Known Problems Son        Prior to Admission medications    Medication Sig Start Date End Date Taking? Authorizing Provider   albuterol sulfate  (90 Base) MCG/ACT inhaler Inhale 2 puffs Every 4 (Four) Hours As Needed for Wheezing or Shortness of Air. 6/7/21  Yes Jayro Sanchez III, MD   cetirizine (zyrTEC) 10 MG tablet Take 1 tablet by mouth Daily. 3/19/21  Yes Jayro Sanchez III, MD   chlorthalidone (HYGROTON) 25 MG tablet Take 12.5 mg by mouth Daily.   Yes ProviderJaki MD   famotidine (PEPCID) 20 MG tablet Take 2 tablets by mouth 2 (Two) Times a Day As Needed for Heartburn. 2/2/21  Yes Jayro Sanchez III, MD   indapamide (LOZOL) 1.25 MG tablet Take 1 tablet by mouth Every Morning. 3/19/21  Yes Jayro Sanchez III, MD   montelukast (SINGULAIR) 10 MG tablet Take 1 tablet by mouth Every Night. 3/19/21  Yes Jayro Sanchez III, MD   multivitamin-iron-minerals-folic acid (CENTRUM) chewable tablet Chew 1 tablet Daily.   Yes Jaki Noland MD   pantoprazole (PROTONIX) 40 MG EC tablet Take 1 tablet by mouth Daily. 3/19/21  Yes Jayro Sanchez III, MD   sucralfate (Carafate) 1 GM/10ML suspension Take 10 mL by mouth 4 (Four) Times a Day. 6/14/21  Yes Smitha Amaya MD   fluticasone (Flonase) 50 MCG/ACT nasal spray 2 sprays into the nostril(s) as directed by provider Daily for 30 days. 3/19/21 4/18/21  Jayro Sanchez III  "MD     No Known Allergies  Social History     Socioeconomic History   • Marital status:    Tobacco Use   • Smoking status: Current Some Day Smoker     Years: 20.00     Types: Cigarettes   • Smokeless tobacco: Never Used   • Tobacco comment: 11/09/2021 - Patient states he utilizes 3 Cigarettes per day on the days he smokes.   Vaping Use   • Vaping Use: Former   • Substances: Nicotine   • Devices: Disposable   Substance and Sexual Activity   • Alcohol use: Not Currently     Comment: 11/09/2021 - Patient states he consumed alcoholic beverages socially.   • Drug use: Not Currently     Types: Marijuana   • Sexual activity: Defer     Comment: .       Review of Systems  Review of Systems   Constitutional: Negative for chills, fatigue, fever and unexpected weight change.   HENT: Negative for congestion, ear discharge, hearing loss, nosebleeds and sore throat.    Eyes: Negative for pain, discharge and redness.   Respiratory: Negative for cough, chest tightness, shortness of breath and wheezing.    Cardiovascular: Negative for chest pain and palpitations.   Gastrointestinal: Negative for abdominal distention, abdominal pain, blood in stool, constipation, diarrhea, nausea and vomiting.   Endocrine: Negative for cold intolerance, polydipsia, polyphagia and polyuria.   Genitourinary: Negative for dysuria, flank pain, frequency, hematuria and urgency.   Musculoskeletal: Negative for arthralgias, back pain, joint swelling and myalgias.   Skin: Negative for color change, pallor and rash.   Neurological: Negative for tremors, seizures, syncope, weakness and headaches.   Hematological: Negative for adenopathy. Does not bruise/bleed easily.   Psychiatric/Behavioral: Negative for behavioral problems, confusion, dysphoric mood, hallucinations and suicidal ideas. The patient is not nervous/anxious.         BP (!) 152/110   Pulse 86   Ht 177.8 cm (70\")   Wt 91 kg (200 lb 9.6 oz)   BMI 28.78 kg/m²     Objective  "   Physical Exam  Constitutional:       Appearance: He is well-developed.   HENT:      Head: Normocephalic and atraumatic.   Eyes:      Conjunctiva/sclera: Conjunctivae normal.      Pupils: Pupils are equal, round, and reactive to light.   Neck:      Thyroid: No thyromegaly.   Cardiovascular:      Rate and Rhythm: Normal rate and regular rhythm.      Heart sounds: Normal heart sounds. No murmur heard.      Pulmonary:      Effort: Pulmonary effort is normal.      Breath sounds: Normal breath sounds. No wheezing.   Abdominal:      General: Bowel sounds are normal. There is no distension.      Palpations: Abdomen is soft. There is no mass.      Tenderness: There is no abdominal tenderness.      Hernia: No hernia is present.   Genitourinary:     Comments: No lesions noted  Musculoskeletal:         General: No tenderness. Normal range of motion.      Cervical back: Normal range of motion and neck supple.   Lymphadenopathy:      Cervical: No cervical adenopathy.   Skin:     General: Skin is warm and dry.      Findings: No rash.   Neurological:      Mental Status: He is alert and oriented to person, place, and time.      Cranial Nerves: No cranial nerve deficit.   Psychiatric:         Thought Content: Thought content normal.       Admission on 06/07/2021, Discharged on 06/07/2021   Component Date Value Ref Range Status   • Case Report 06/07/2021    Final                    Value:Surgical Pathology Report                         Case: [id number]                                  Authorizing Provider:  Smitha Amaya MD      Collected:           06/07/2021 03:01 PM          Ordering Location:     [facility]             Received:            06/08/2021 06:46 AM                                 [facility]                                                     Pathologist:           Monico Dennis MD                                                       Specimens:   1) - Small Intestine, Duodenum, duodenum  bx                                                         2) - Gastric, Antrum, antrum bx                                                                     3) - Esophagus, eg junction bx                                                                      4) - Small Intestine, Ileum, ti bx                                                                  5) - Large Intestine, colonic mucosa bx                                                                                       6) - Large Intestine, Right / Ascending Colon, ascending colon polyps X 2                 • Final Diagnosis 06/07/2021    Final                    Value:This result contains rich text formatting which cannot be displayed here.     Assessment/Plan    No diagnosis found..   1.  Epigastric pain, resolved.  Continue Protonix and Pepcid.  Discontinue Carafate 1 g p.o. 4 times daily.   2.  Abdominal pain with diarrhea, resolved.  Continue Bentyl.  3.  Colon polyps, repeat colonoscopy in 3 years  4.   Diverticulosis, add high-fiber diet.  5.  High BMI, recommend exercise and diet control.  6.  Tobacco usage, recommend cessation.  7.  Accelerated hypertension, recommend PCP evaluation as soon as possible.    Orders placed during this encounter include:  No orders of the defined types were placed in this encounter.      * Surgery not found *    Review and/or summary of lab tests, radiology, procedures, medications. Review and summary of old records and obtaining of history. The risks and benefits of my recommendations, as well as other treatment options were discussed with the patient today. Questions were answered.    No orders of the defined types were placed in this encounter.      Follow-up: Return in about 1 year (around 11/9/2022).               Results for orders placed or performed during the hospital encounter of 06/07/21   Tissue Pathology Exam    Specimen: A: Small Intestine, Duodenum; Tissue    B: Gastric, Antrum; Tissue    C: Esophagus;  Tissue    D: Small Intestine, Ileum; Tissue    E: Large Intestine; Tissue    F: Large Intestine, Right / Ascending Colon; Tissue   Result Value Ref Range    Case Report       Surgical Pathology Report                         Case: [id number]                                  Authorizing Provider:  Smitha Amaya MD      Collected:           06/07/2021 03:01 PM          Ordering Location:     [facility]             Received:            06/08/2021 06:46 AM                                 [facility]                                                     Pathologist:           Monico Dennis MD                                                       Specimens:   1) - Small Intestine, Duodenum, duodenum bx                                                         2) - Gastric, Antrum, antrum bx                                                                     3) - Esophagus, eg junction bx                                                                      4) - Small Intestine, Ileum, ti bx                                                                  5) - Large Intestine, colonic mucosa bx                                                              6) - Large Intestine, Right / Ascending Colon, ascending colon polyps X 2                  Final Diagnosis       SEE SCANNED REPORT       Results for orders placed or performed in visit on 06/04/21   COVID-19,  [facility]-HOUSE, NP SWAB IN TRANSPORT MEDIA 8-10 HR TAT - Swab, Nasopharynx    Specimen: Nasopharynx; Swab   Result Value Ref Range    COVID19 Not Detected Not Detected - Ref. Range   Results for orders placed or performed in visit on 04/13/21   COVID-19,  [facility]-HOUSE, NP SWAB IN TRANSPORT MEDIA 8-10 HR TAT - Swab, Nasopharynx    Specimen: Nasopharynx; Swab   Result Value Ref Range    COVID19 Not Detected Not Detected - Ref. Range   Results for orders placed or performed during the hospital encounter of 05/30/20   Urinalysis With Microscopic  If Indicated (No Culture) - Urine, Clean Catch    Specimen: Urine, Clean Catch   Result Value Ref Range    Color, UA Yellow Yellow, Straw, Dark Yellow, Sherice    Appearance, UA Clear Clear    pH, UA 7.0 5.0 - 9.0    Specific Gravity, UA 1.020 1.003 - 1.030    Glucose, UA Negative Negative    Ketones, UA Negative Negative    Bilirubin, UA Negative Negative    Blood, UA Negative Negative    Protein, UA Negative Negative    Leuk Esterase, UA Negative Negative    Nitrite, UA Negative Negative    Urobilinogen, UA 1.0 E.U./dL 0.2 - 1.0 E.U./dL   CBC Auto Differential    Specimen: Blood   Result Value Ref Range    WBC 6.35 3.40 - 10.80 10*3/mm3    RBC 5.01 4.14 - 5.80 10*6/mm3    Hemoglobin 15.5 13.0 - 17.7 g/dL    Hematocrit 45.1 37.5 - 51.0 %    MCV 90.0 79.0 - 97.0 fL    MCH 30.9 26.6 - 33.0 pg    MCHC 34.4 31.5 - 35.7 g/dL    RDW 13.3 12.3 - 15.4 %    RDW-SD 43.8 37.0 - 54.0 fl    MPV 11.7 6.0 - 12.0 fL    Platelets 170 140 - 450 10*3/mm3    Neutrophil % 42.7 42.7 - 76.0 %    Lymphocyte % 39.2 19.6 - 45.3 %    Monocyte % 14.2 (H) 5.0 - 12.0 %    Eosinophil % 3.3 0.3 - 6.2 %    Basophil % 0.3 0.0 - 1.5 %    Immature Grans % 0.3 0.0 - 0.5 %    Neutrophils, Absolute 2.71 1.70 - 7.00 10*3/mm3    Lymphocytes, Absolute 2.49 0.70 - 3.10 10*3/mm3    Monocytes, Absolute 0.90 0.10 - 0.90 10*3/mm3    Eosinophils, Absolute 0.21 0.00 - 0.40 10*3/mm3    Basophils, Absolute 0.02 0.00 - 0.20 10*3/mm3    Immature Grans, Absolute 0.02 0.00 - 0.05 10*3/mm3    nRBC 0.0 0.0 - 0.2 /100 WBC   Troponin    Specimen: Blood   Result Value Ref Range    Troponin T <0.010 0.000 - 0.030 ng/mL   Troponin    Specimen: Blood   Result Value Ref Range    Troponin T <0.010 0.000 - 0.030 ng/mL   Urine Drug Screen - Urine, Clean Catch    Specimen: Urine, Clean Catch   Result Value Ref Range    THC, Screen, Urine Negative Negative    Phencyclidine (PCP), Urine Negative Negative    Cocaine Screen, Urine Negative Negative    Methamphetamine, Ur Negative  Negative    Opiate Screen Negative Negative    Amphetamine Screen, Urine Negative Negative    Benzodiazepine Screen, Urine Negative Negative    Tricyclic Antidepressants Screen Negative Negative    Methadone Screen, Urine Negative Negative    Barbiturates Screen, Urine Negative Negative    Oxycodone Screen, Urine Negative Negative    Propoxyphene Screen Negative Negative    Buprenorphine, Screen, Urine Negative Negative   D-dimer, Quantitative    Specimen: Blood   Result Value Ref Range    D-Dimer, Quantitative <270 0 - 470 ng/mL (FEU)   BNP    Specimen: Blood   Result Value Ref Range    proBNP 15.0 5.0 - 450.0 pg/mL   Comprehensive Metabolic Panel    Specimen: Blood   Result Value Ref Range    Glucose 94 65 - 99 mg/dL    BUN 14 6 - 20 mg/dL    Creatinine 1.06 0.76 - 1.27 mg/dL    Sodium 135 (L) 136 - 145 mmol/L    Potassium 4.0 3.5 - 5.2 mmol/L    Chloride 105 98 - 107 mmol/L    CO2 23.0 22.0 - 29.0 mmol/L    Calcium 8.8 8.6 - 10.5 mg/dL    Total Protein 7.2 6.0 - 8.5 g/dL    Albumin 4.30 3.50 - 5.20 g/dL    ALT (SGPT) 23 1 - 41 U/L    AST (SGOT) 25 1 - 40 U/L    Alkaline Phosphatase 88 39 - 117 U/L    Total Bilirubin 0.5 0.2 - 1.2 mg/dL    eGFR  African Amer 93 >60 mL/min/1.73    Globulin 2.9 gm/dL    A/G Ratio 1.5 g/dL    BUN/Creatinine Ratio 13.2 7.0 - 25.0    Anion Gap 7.0 5.0 - 15.0 mmol/L   Results for orders placed or performed in visit on 06/03/19   CBC Auto Differential    Specimen: Blood   Result Value Ref Range    WBC 6.55 3.40 - 10.80 10*3/mm3    RBC 5.10 4.14 - 5.80 10*6/mm3    Hemoglobin 15.5 13.0 - 17.7 g/dL    Hematocrit 46.5 37.5 - 51.0 %    MCV 91.2 79.0 - 97.0 fL    MCH 30.4 26.6 - 33.0 pg    MCHC 33.3 31.5 - 35.7 g/dL    RDW 12.6 12.3 - 15.4 %    RDW-SD 41.8 37.0 - 54.0 fl    MPV 12.8 (H) 6.0 - 12.0 fL    Platelets 181 140 - 450 10*3/mm3    Neutrophil % 39.5 (L) 42.7 - 76.0 %    Lymphocyte % 40.5 19.6 - 45.3 %    Monocyte % 14.8 (H) 5.0 - 12.0 %    Eosinophil % 4.6 0.3 - 6.2 %    Basophil % 0.3  0.0 - 1.5 %    Immature Grans % 0.3 0.0 - 0.5 %    Neutrophils, Absolute 2.59 1.70 - 7.00 10*3/mm3    Lymphocytes, Absolute 2.65 0.70 - 3.10 10*3/mm3    Monocytes, Absolute 0.97 (H) 0.10 - 0.90 10*3/mm3    Eosinophils, Absolute 0.30 0.00 - 0.40 10*3/mm3    Basophils, Absolute 0.02 0.00 - 0.20 10*3/mm3    Immature Grans, Absolute 0.02 0.00 - 0.05 10*3/mm3    nRBC 0.0 0.0 - 0.2 /100 WBC     *Note: Due to a large number of results and/or encounters for the requested time period, some results have not been displayed. A complete set of results can be found in Results Review.         This document has been electronically signed by Smitha Amaya MD on November 9, 2021 12:55 CST

## 2021-11-09 NOTE — PATIENT INSTRUCTIONS
Managing the Challenge of Quitting Smoking  Quitting smoking is a physical and mental challenge. You will face cravings, withdrawal symptoms, and temptation. Before quitting, work with your health care provider to make a plan that can help you manage quitting. Preparation can help you quit and keep you from giving in.  How to manage lifestyle changes  Managing stress  Stress can make you want to smoke, and wanting to smoke may cause stress. It is important to find ways to manage your stress. You might try some of the following:  · Practice relaxation techniques.  ? Breathe slowly and deeply, in through your nose and out through your mouth.  ? Listen to music.  ? Soak in a bath or take a shower.  ? Imagine a peaceful place or vacation.  · Get some support.  ? Talk with family or friends about your stress.  ? Join a support group.  ? Talk with a counselor or therapist.  · Get some physical activity.  ? Go for a walk, run, or bike ride.  ? Play a favorite sport.  ? Practice yoga.    Medicines  Talk with your health care provider about medicines that might help you deal with cravings and make quitting easier for you.  Relationships  Social situations can be difficult when you are quitting smoking. To manage this, you can:  · Avoid parties and other social situations where people might be smoking.  · Avoid alcohol.  · Leave right away if you have the urge to smoke.  · Explain to your family and friends that you are quitting smoking. Ask for support and let them know you might be a bit grumpy.  · Plan activities where smoking is not an option.  General instructions  Be aware that many people gain weight after they quit smoking. However, not everyone does. To keep from gaining weight, have a plan in place before you quit and stick to the plan after you quit. Your plan should include:  · Having healthy snacks. When you have a craving, it may help to:  ? Eat popcorn, carrots, celery, or other cut vegetables.  ? Chew  sugar-free gum.  · Changing how you eat.  ? Eat small portion sizes at meals.  ? Eat 4-6 small meals throughout the day instead of 1-2 large meals a day.  ? Be mindful when you eat. Do not watch television or do other things that might distract you as you eat.  · Exercising regularly.  ? Make time to exercise each day. If you do not have time for a long workout, do short bouts of exercise for 5-10 minutes several times a day.  ? Do some form of strengthening exercise, such as weight lifting.  ? Do some exercise that gets your heart beating and causes you to breathe deeply, such as walking fast, running, swimming, or biking. This is very important.  · Drinking plenty of water or other low-calorie or no-calorie drinks. Drink 6-8 glasses of water daily.    How to recognize withdrawal symptoms  Your body and mind may experience discomfort as you try to get used to not having nicotine in your system. These effects are called withdrawal symptoms. They may include:  · Feeling hungrier than normal.  · Having trouble concentrating.  · Feeling irritable or restless.  · Having trouble sleeping.  · Feeling depressed.  · Craving a cigarette.  To manage withdrawal symptoms:  · Avoid places, people, and activities that trigger your cravings.  · Remember why you want to quit.  · Get plenty of sleep.  · Avoid coffee and other caffeinated drinks. These may worsen some of your symptoms.  These symptoms may surprise you. But be assured that they are normal to have when quitting smoking.  How to manage cravings  Come up with a plan for how to deal with your cravings. The plan should include the following:  · A definition of the specific situation you want to deal with.  · An alternative action you will take.  · A clear idea for how this action will help.  · The name of someone who might help you with this.  Cravings usually last for 5-10 minutes. Consider taking the following actions to help you with your plan to deal with  cravings:  · Keep your mouth busy.  ? Chew sugar-free gum.  ? Suck on hard candies or a straw.  ? Brush your teeth.  · Keep your hands and body busy.  ? Change to a different activity right away.  ? Squeeze or play with a ball.  ? Do an activity or a hobby, such as making bead jewelry, practicing needlepoint, or working with wood.  ? Mix up your normal routine.  ? Take a short exercise break. Go for a quick walk or run up and down stairs.  · Focus on doing something kind or helpful for someone else.  · Call a friend or family member to talk during a craving.  · Join a support group.  · Contact a quitline.  Where to find support  To get help or find a support group:  · Call the [employer] [place]'s Smoking Quitline: 9-591-QUIT NOW (766-1721)  · Visit the website of the Substance Abuse and Mental Health Services Administration: www.samhsa.gov  · Text QUIT to SmokefreeTXT: 853871  Where to find more information  Visit these websites to find more information on quitting smoking:  · [employer] [place]: www.smokefree.gov  · American Lung Association: www.lung.org  · American Cancer Society: www.cancer.org  · Centers for Disease Control and Prevention: www.cdc.gov  · American Heart Association: www.heart.org  Contact a health care provider if:  · You want to change your plan for quitting.  · The medicines you are taking are not helping.  · Your eating feels out of control or you cannot sleep.  Get help right away if:  · You feel depressed or become very anxious.  Summary  · Quitting smoking is a physical and mental challenge. You will face cravings, withdrawal symptoms, and temptation to smoke again. Preparation can help you as you go through these challenges.  · Try different techniques to manage stress, handle social situations, and prevent weight gain.  · You can deal with cravings by keeping your mouth busy (such as by chewing gum), keeping your hands and body busy, calling family or friends, or  "contacting a quitline for people who want to quit smoking.  · You can deal with withdrawal symptoms by avoiding places where people smoke, getting plenty of rest, and avoiding drinks with caffeine.  This information is not intended to replace advice given to you by your health care provider. Make sure you discuss any questions you have with your health care provider.  Document Revised: 10/06/2020 Document Reviewed: 10/06/2020  [employer] Patient Education © 2021 [employer]    Hypertension, Adult  High blood pressure (hypertension) is when the force of blood pumping through the arteries is too strong. The arteries are the blood vessels that carry blood from the heart throughout the body. Hypertension forces the heart to work harder to pump blood and may cause arteries to become narrow or stiff. Untreated or uncontrolled hypertension can cause a heart attack, heart failure, a stroke, kidney disease, and other problems.  A blood pressure reading consists of a higher number over a lower number. Ideally, your blood pressure should be below 120/80. The first (\"top\") number is called the systolic pressure. It is a measure of the pressure in your arteries as your heart beats. The second (\"bottom\") number is called the diastolic pressure. It is a measure of the pressure in your arteries as the heart relaxes.  What are the causes?  The exact cause of this condition is not known. There are some conditions that result in or are related to high blood pressure.  What increases the risk?  Some risk factors for high blood pressure are under your control. The following factors may make you more likely to develop this condition:  · Smoking.  · Having type 2 diabetes mellitus, high cholesterol, or both.  · Not getting enough exercise or physical activity.  · Being overweight.  · Having too much fat, sugar, calories, or salt (sodium) in your diet.  · Drinking too much alcohol.  Some risk factors for high blood pressure may be " difficult or impossible to change. Some of these factors include:  · Having chronic kidney disease.  · Having a family history of high blood pressure.  · Age. Risk increases with age.  · Race. You may be at higher risk if you are .  · Gender. Men are at higher risk than women before age 45. After age 65, women are at higher risk than men.  · Having obstructive sleep apnea.  · Stress.  What are the signs or symptoms?  High blood pressure may not cause symptoms. Very high blood pressure (hypertensive crisis) may cause:  · Headache.  · Anxiety.  · Shortness of breath.  · Nosebleed.  · Nausea and vomiting.  · Vision changes.  · Severe chest pain.  · Seizures.  How is this diagnosed?  This condition is diagnosed by measuring your blood pressure while you are seated, with your arm resting on a flat surface, your legs uncrossed, and your feet flat on the floor. The cuff of the blood pressure monitor will be placed directly against the skin of your upper arm at the level of your heart. It should be measured at least twice using the same arm. Certain conditions can cause a difference in blood pressure between your right and left arms.  Certain factors can cause blood pressure readings to be lower or higher than normal for a short period of time:  · When your blood pressure is higher when you are in a health care provider's office than when you are at home, this is called white coat hypertension. Most people with this condition do not need medicines.  · When your blood pressure is higher at home than when you are in a health care provider's office, this is called masked hypertension. Most people with this condition may need medicines to control blood pressure.  If you have a high blood pressure reading during one visit or you have normal blood pressure with other risk factors, you may be asked to:  · Return on a different day to have your blood pressure checked again.  · Monitor your blood pressure at home for  1 week or longer.  If you are diagnosed with hypertension, you may have other blood or imaging tests to help your health care provider understand your overall risk for other conditions.  How is this treated?  This condition is treated by making healthy lifestyle changes, such as eating healthy foods, exercising more, and reducing your alcohol intake. Your health care provider may prescribe medicine if lifestyle changes are not enough to get your blood pressure under control, and if:  · Your systolic blood pressure is above 130.  · Your diastolic blood pressure is above 80.  Your personal target blood pressure may vary depending on your medical conditions, your age, and other factors.  Follow these instructions at home:  Eating and drinking    · Eat a diet that is high in fiber and potassium, and low in sodium, added sugar, and fat. An example eating plan is called the DASH (Dietary Approaches to Stop Hypertension) diet. To eat this way:  ? Eat plenty of fresh fruits and vegetables. Try to fill one half of your plate at each meal with fruits and vegetables.  ? Eat whole grains, such as whole-wheat pasta, brown rice, or whole-grain bread. Fill about one fourth of your plate with whole grains.  ? Eat or drink low-fat dairy products, such as skim milk or low-fat yogurt.  ? Avoid fatty cuts of meat, processed or cured meats, and poultry with skin. Fill about one fourth of your plate with lean proteins, such as fish, chicken without skin, beans, eggs, or tofu.  ? Avoid pre-made and processed foods. These tend to be higher in sodium, added sugar, and fat.  · Reduce your daily sodium intake. Most people with hypertension should eat less than 1,500 mg of sodium a day.  · Do not drink alcohol if:  ? Your health care provider tells you not to drink.  ? You are pregnant, may be pregnant, or are planning to become pregnant.  · If you drink alcohol:  ? Limit how much you use to:  § 0-1 drink a day for women.  § 0-2 drinks a day  for men.  ? Be aware of how much alcohol is in your drink. In the [place] one drink equals one 12 oz bottle of beer (355 mL), one 5 oz glass of wine (148 mL), or one 1½ oz glass of hard liquor (44 mL).    Lifestyle    · Work with your health care provider to maintain a healthy body weight or to lose weight. Ask what an ideal weight is for you.  · Get at least 30 minutes of exercise most days of the week. Activities may include walking, swimming, or biking.  · Include exercise to strengthen your muscles (resistance exercise), such as Pilates or lifting weights, as part of your weekly exercise routine. Try to do these types of exercises for 30 minutes at least 3 days a week.  · Do not use any products that contain nicotine or tobacco, such as cigarettes, e-cigarettes, and chewing tobacco. If you need help quitting, ask your health care provider.  · Monitor your blood pressure at home as told by your health care provider.  · Keep all follow-up visits as told by your health care provider. This is important.    Medicines  · Take over-the-counter and prescription medicines only as told by your health care provider. Follow directions carefully. Blood pressure medicines must be taken as prescribed.  · Do not skip doses of blood pressure medicine. Doing this puts you at risk for problems and can make the medicine less effective.  · Ask your health care provider about side effects or reactions to medicines that you should watch for.  Contact a health care provider if you:  · Think you are having a reaction to a medicine you are taking.  · Have headaches that keep coming back (recurring).  · Feel dizzy.  · Have swelling in your ankles.  · Have trouble with your vision.  Get help right away if you:  · Develop a severe headache or confusion.  · Have unusual weakness or numbness.  · Feel faint.  · Have severe pain in your chest or abdomen.  · Vomit repeatedly.  · Have trouble breathing.  Summary  · Hypertension is when the force of  blood pumping through your arteries is too strong. If this condition is not controlled, it may put you at risk for serious complications.  · Your personal target blood pressure may vary depending on your medical conditions, your age, and other factors. For most people, a normal blood pressure is less than 120/80.  · Hypertension is treated with lifestyle changes, medicines, or a combination of both. Lifestyle changes include losing weight, eating a healthy, low-sodium diet, exercising more, and limiting alcohol.  This information is not intended to replace advice given to you by your health care provider. Make sure you discuss any questions you have with your health care provider.  Document Revised: 08/28/2019 Document Reviewed: 08/28/2019  [employer] Patient Education © 2021 [employer]

## 2022-12-08 ENCOUNTER — OFFICE VISIT (OUTPATIENT)
Dept: FAMILY MEDICINE CLINIC | Facility: CLINIC | Age: 42
End: 2022-12-08

## 2022-12-08 ENCOUNTER — LAB (OUTPATIENT)
Dept: LAB | Facility: HOSPITAL | Age: 42
End: 2022-12-08

## 2022-12-08 VITALS
SYSTOLIC BLOOD PRESSURE: 138 MMHG | BODY MASS INDEX: 29.11 KG/M2 | WEIGHT: 203.3 LBS | TEMPERATURE: 98.6 F | DIASTOLIC BLOOD PRESSURE: 78 MMHG | OXYGEN SATURATION: 97 % | HEART RATE: 86 BPM | HEIGHT: 70 IN

## 2022-12-08 DIAGNOSIS — G47.30 SLEEP APNEA, UNSPECIFIED TYPE: ICD-10-CM

## 2022-12-08 DIAGNOSIS — Z86.59 HISTORY OF ADHD: ICD-10-CM

## 2022-12-08 DIAGNOSIS — J30.2 SEASONAL ALLERGIC RHINITIS, UNSPECIFIED TRIGGER: ICD-10-CM

## 2022-12-08 DIAGNOSIS — K21.9 GASTROESOPHAGEAL REFLUX DISEASE, UNSPECIFIED WHETHER ESOPHAGITIS PRESENT: ICD-10-CM

## 2022-12-08 DIAGNOSIS — I10 ESSENTIAL HYPERTENSION, BENIGN: Primary | ICD-10-CM

## 2022-12-08 DIAGNOSIS — I10 ESSENTIAL HYPERTENSION, BENIGN: ICD-10-CM

## 2022-12-08 DIAGNOSIS — J45.990 EXERCISE-INDUCED ASTHMA: ICD-10-CM

## 2022-12-08 DIAGNOSIS — M54.32 SCIATICA OF LEFT SIDE: ICD-10-CM

## 2022-12-08 LAB
BASOPHILS # BLD AUTO: 0.01 10*3/MM3 (ref 0–0.2)
BASOPHILS NFR BLD AUTO: 0.2 % (ref 0–1.5)
DEPRECATED RDW RBC AUTO: 42.3 FL (ref 37–54)
EOSINOPHIL # BLD AUTO: 0.19 10*3/MM3 (ref 0–0.4)
EOSINOPHIL NFR BLD AUTO: 3 % (ref 0.3–6.2)
ERYTHROCYTE [DISTWIDTH] IN BLOOD BY AUTOMATED COUNT: 13.5 % (ref 12.3–15.4)
HBA1C MFR BLD: 6.1 % (ref 4.8–5.6)
HCT VFR BLD AUTO: 41.6 % (ref 37.5–51)
HGB BLD-MCNC: 14.1 G/DL (ref 13–17.7)
IMM GRANULOCYTES # BLD AUTO: 0.02 10*3/MM3 (ref 0–0.05)
IMM GRANULOCYTES NFR BLD AUTO: 0.3 % (ref 0–0.5)
LYMPHOCYTES # BLD AUTO: 2.72 10*3/MM3 (ref 0.7–3.1)
LYMPHOCYTES NFR BLD AUTO: 43.5 % (ref 19.6–45.3)
MCH RBC QN AUTO: 29.4 PG (ref 26.6–33)
MCHC RBC AUTO-ENTMCNC: 33.9 G/DL (ref 31.5–35.7)
MCV RBC AUTO: 86.8 FL (ref 79–97)
MONOCYTES # BLD AUTO: 0.72 10*3/MM3 (ref 0.1–0.9)
MONOCYTES NFR BLD AUTO: 11.5 % (ref 5–12)
NEUTROPHILS NFR BLD AUTO: 2.59 10*3/MM3 (ref 1.7–7)
NEUTROPHILS NFR BLD AUTO: 41.5 % (ref 42.7–76)
NRBC BLD AUTO-RTO: 0 /100 WBC (ref 0–0.2)
PLATELET # BLD AUTO: 186 10*3/MM3 (ref 140–450)
PMV BLD AUTO: 12.1 FL (ref 6–12)
RBC # BLD AUTO: 4.79 10*6/MM3 (ref 4.14–5.8)
WBC NRBC COR # BLD: 6.25 10*3/MM3 (ref 3.4–10.8)

## 2022-12-08 PROCEDURE — 99213 OFFICE O/P EST LOW 20 MIN: CPT

## 2022-12-08 PROCEDURE — 86803 HEPATITIS C AB TEST: CPT

## 2022-12-08 PROCEDURE — 80050 GENERAL HEALTH PANEL: CPT

## 2022-12-08 PROCEDURE — 36415 COLL VENOUS BLD VENIPUNCTURE: CPT

## 2022-12-08 PROCEDURE — 83036 HEMOGLOBIN GLYCOSYLATED A1C: CPT

## 2022-12-08 PROCEDURE — 80061 LIPID PANEL: CPT

## 2022-12-08 RX ORDER — CHLORTHALIDONE 25 MG/1
12.5 TABLET ORAL DAILY
Qty: 30 TABLET | Refills: 1 | Status: SHIPPED | OUTPATIENT
Start: 2022-12-08 | End: 2023-04-06 | Stop reason: SDUPTHER

## 2022-12-08 RX ORDER — FAMOTIDINE 20 MG/1
40 TABLET, FILM COATED ORAL 2 TIMES DAILY PRN
Qty: 120 TABLET | Refills: 2 | Status: SHIPPED | OUTPATIENT
Start: 2022-12-08

## 2022-12-08 RX ORDER — FLUTICASONE PROPIONATE 50 MCG
2 SPRAY, SUSPENSION (ML) NASAL DAILY
Start: 2022-12-08 | End: 2023-02-06

## 2022-12-08 RX ORDER — PANTOPRAZOLE SODIUM 40 MG/1
40 TABLET, DELAYED RELEASE ORAL DAILY
Qty: 30 TABLET | Refills: 2 | Status: SHIPPED | OUTPATIENT
Start: 2022-12-08

## 2022-12-08 RX ORDER — MONTELUKAST SODIUM 10 MG/1
10 TABLET ORAL NIGHTLY
Qty: 30 TABLET | Refills: 11 | Status: SHIPPED | OUTPATIENT
Start: 2022-12-08

## 2022-12-08 RX ORDER — ALBUTEROL SULFATE 90 UG/1
2 AEROSOL, METERED RESPIRATORY (INHALATION) EVERY 4 HOURS PRN
Qty: 18 G | Refills: 2 | Status: SHIPPED | OUTPATIENT
Start: 2022-12-08

## 2022-12-08 NOTE — PROGRESS NOTES
"  Family Medicine Residency  Reanna Bess MD    Subjective:     Nitin Jackson is a 42 y.o. male who presents to [facility] care. He has a past medical history of exercise induced asthma, GERD, hypertension, and obstructive sleep apnea. He has been incarcerated for the past year and needs to restart his medications and wishes to have lab work completed. He also desires a referral to sleep medicine to obtain a new CPAP machine.   He currently has left side back pain that radiates to his thigh. There is tingling associated with the pain and morning stiffness. He has been taking ibuprofen and stretching daily to help relieve the pain. He mentions a previous diagnosis of ADHD and he wishes to restart medications due to lack of concentration and focus.     The following portions of the patient's history were reviewed and updated as appropriate: allergies, current medications, past family history, past medical history, past social history, past surgical history and problem list.    Past Medical Hx:  Past Medical History:   Diagnosis Date   • Allergic rhinitis    • Asthma    • Chronic dermatitis    • Chronic right shoulder pain    • Cigarette nicotine dependence, uncomplicated 8/28/2019   • Cigarette smoker 8/24/2016   • Cutaneous sarcoidosis 8/24/2016   • DJD (degenerative joint disease)     shoulder region, right side   • Dyspnea on exertion    • Elevated blood pressure    • Encounter for routine adult health examination    • Fatigue    • Hypertension     \"NOT TAKING MEDS THAT WERE PRESCRIBED\"   • Obstructive sleep apnea    • Obstructive sleep apnea syndrome 8/24/2016   • Pain in left knee    • Vertigo        Past Surgical Hx:  Past Surgical History:   Procedure Laterality Date   • BREAST LUMPECTOMY WITH AXILLARY NODE DISSECTION Left 8/22/2017    Procedure: LEFT AXILLARY LYMPH NODE BIOPSYl;  Surgeon: Bryan Arnold MD;  Location: [facility];  Service:    • COLONOSCOPY N/A 6/7/2021    Procedure: COLONOSCOPY;  " Surgeon: Smitha Amaya MD;  Location: [facility] ENDOSCOPY;  Service: Gastroenterology;  Laterality: N/A;   • ENDOSCOPY N/A 6/7/2021    Procedure: ESOPHAGOGASTRODUODENOSCOPY;  Surgeon: Smitha Amaya MD;  Location: [facility] ENDOSCOPY;  Service: Gastroenterology;  Laterality: N/A;   • INJECTION OF MEDICATION  08/10/2016    Kenalog   • SHOULDER ARTHROSCOPY  01/13/2015    Arthroscopy of shoulder, subacromial decompression, Reed, and injection of the shoulder with 80 ml Kenalog   • SHOULDER SURGERY     • UPPER GASTROINTESTINAL ENDOSCOPY  06/07/2021   • WISDOM TOOTH EXTRACTION      2 removed       Current Meds:    Current Outpatient Medications:   •  albuterol sulfate  (90 Base) MCG/ACT inhaler, Inhale 2 puffs Every 4 (Four) Hours As Needed for Wheezing or Shortness of Air., Disp: 18 g, Rfl: 2  •  chlorthalidone (HYGROTON) 25 MG tablet, Take 0.5 tablets by mouth Daily., Disp: 30 tablet, Rfl: 1  •  famotidine (PEPCID) 20 MG tablet, Take 2 tablets by mouth 2 (Two) Times a Day As Needed for Heartburn., Disp: 120 tablet, Rfl: 2  •  fluticasone (Flonase) 50 MCG/ACT nasal spray, 2 sprays into the nostril(s) as directed by provider Daily for 30 days., Disp: , Rfl:   •  montelukast (SINGULAIR) 10 MG tablet, Take 1 tablet by mouth Every Night., Disp: 30 tablet, Rfl: 11  •  pantoprazole (PROTONIX) 40 MG EC tablet, Take 1 tablet by mouth Daily., Disp: 30 tablet, Rfl: 2  •  cetirizine (zyrTEC) 10 MG tablet, Take 1 tablet by mouth Daily., Disp: 30 tablet, Rfl: 2  •  multivitamin-iron-minerals-folic acid (CENTRUM) chewable tablet, Chew 1 tablet Daily., Disp: , Rfl:     Allergies:  No Known Allergies    Family Hx:  Family History   Problem Relation Age of Onset   • Diabetes Other    • Heart disease Other    • Hypertension Other    • No Known Problems Mother    • No Known Problems Father    • No Known Problems Sister    • Heart block Maternal Grandmother    • Lung cancer Paternal Grandmother    • Lung cancer Paternal  "Grandfather    • No Known Problems Half-Brother    • No Known Problems Half-Brother    • No Known Problems Sister    • No Known Problems Daughter    • No Known Problems Daughter    • No Known Problems Son    • No Known Problems Son         Social History:  Social History     Socioeconomic History   • Marital status:    Tobacco Use   • Smoking status: Some Days     Packs/day: 0.50     Years: 20.00     Pack years: 10.00     Types: Cigarettes   • Smokeless tobacco: Never   • Tobacco comments:     11/09/2021 - Patient states he utilizes 3 Cigarettes per day on the days he smokes.   Vaping Use   • Vaping Use: Former   • Substances: Nicotine   • Devices: Disposable   Substance and Sexual Activity   • Alcohol use: Not Currently     Comment: 11/09/2021 - Patient states he consumed alcoholic beverages socially.   • Drug use: Not Currently     Types: Marijuana   • Sexual activity: Defer     Comment: .       Review of Systems  Review of Systems   Constitutional: Negative.    HENT: Negative.    Eyes: Negative.    Respiratory: Negative.    Cardiovascular: Negative.    Gastrointestinal: Positive for abdominal pain.        Epigastric pain occurs at night     Endocrine: Negative.    Genitourinary: Negative.    Musculoskeletal: Positive for arthralgias and back pain.   Skin: Negative.    Allergic/Immunologic: Negative.    Neurological: Negative.    Hematological: Negative.    Psychiatric/Behavioral: Negative.        Objective:     /78   Pulse 86   Temp 98.6 °F (37 °C)   Ht 177.8 cm (70\")   Wt 92.2 kg (203 lb 4.8 oz)   SpO2 97%   BMI 29.17 kg/m²   Physical Exam  Constitutional:       Appearance: Normal appearance.   HENT:      Head: Normocephalic.      Right Ear: External ear normal.      Left Ear: External ear normal.      Nose: Nose normal.      Mouth/Throat:      Mouth: Mucous membranes are moist.      Pharynx: Oropharynx is clear.   Eyes:      Extraocular Movements: Extraocular movements intact.      " Conjunctiva/sclera: Conjunctivae normal.      Pupils: Pupils are equal, round, and reactive to light.   Cardiovascular:      Rate and Rhythm: Normal rate and regular rhythm.      Pulses: Normal pulses.      Heart sounds: Normal heart sounds.   Pulmonary:      Effort: Pulmonary effort is normal.      Breath sounds: Normal breath sounds.   Abdominal:      General: Abdomen is flat. Bowel sounds are normal.      Palpations: Abdomen is soft.   Musculoskeletal:         General: Normal range of motion.      Cervical back: Normal range of motion and neck supple.   Skin:     General: Skin is warm and dry.   Neurological:      General: No focal deficit present.      Mental Status: He is alert.   Psychiatric:         Mood and Affect: Mood normal.         Behavior: Behavior normal.         Thought Content: Thought content normal.         Judgment: Judgment normal.          Assessment/Plan:     Diagnoses and all orders for this visit:    1. Essential hypertension, benign (Primary)  -     CBC w AUTO Differential; Future  -     Comprehensive metabolic panel; Future  -     Lipid panel; Future  -     TSH Rfx On Abnormal To Free T4; Future  -     Hepatitis C antibody; Future  -     Hemoglobin A1c; Future  -     chlorthalidone (HYGROTON) 25 MG tablet; Take 0.5 tablets by mouth Daily.  Dispense: 30 tablet; Refill: 1    2. Sleep apnea, unspecified type  -     Ambulatory Referral to Sleep Medicine    3. History of ADHD  -     Ambulatory Referral to Behavioral Health    4. Exercise-induced asthma  -     albuterol sulfate  (90 Base) MCG/ACT inhaler; Inhale 2 puffs Every 4 (Four) Hours As Needed for Wheezing or Shortness of Air.  Dispense: 18 g; Refill: 2    5. Gastroesophageal reflux disease, unspecified whether esophagitis present  -     famotidine (PEPCID) 20 MG tablet; Take 2 tablets by mouth 2 (Two) Times a Day As Needed for Heartburn.  Dispense: 120 tablet; Refill: 2  -     pantoprazole (PROTONIX) 40 MG EC tablet; Take 1 tablet  by mouth Daily.  Dispense: 30 tablet; Refill: 2    6. Seasonal allergic rhinitis, unspecified trigger  -     fluticasone (Flonase) 50 MCG/ACT nasal spray; 2 sprays into the nostril(s) as directed by provider Daily for 30 days.  -     montelukast (SINGULAIR) 10 MG tablet; Take 1 tablet by mouth Every Night.  Dispense: 30 tablet; Refill: 11    7. Sciatica of left side      Plan:   - Restart previous medications. Patient advised to take pepcid at night before bed and protonix in the morning to help reduce his nighttime symptoms. He occasionally has shortness of breath and chest tightness when exercising, he is advised to use Albuterol as needed and to notify office if use of inhaler increases.    - Obtain updated laboratory work, review results with patient at 2 week follow up appointment.   - Referral placed for behavioral health for ADHD diagnosis. Patient is unsure of when he received the diagnosis. I discussed with patient this will be helpful in determining medication choices, which will be discussed at 2 week follow up. I mentioned use on non-stimulant medications to which he was agreeable too.   - Continue ibuprofen use as needed for sciatic pain, and continue stretches. Handout was given at discharge of stretches to perform. If worsens I discussed potential for physical therapy.       · Rx changes: No changes made at this time  · Patient Education: Take Pepcid at night before bed rather than daytime.  · Compliance at present is estimated to be satisfactory.   · Efforts to improve compliance (if necessary) will be directed at Take medications as prescribed.    Depression screening: Patient screened positive for depression based on a PHQ-9 score of 9 on 12/8/22 . Follow-up recommendations include: Elevated PHQ score reflective of acute illness, not depression. Sleep Apnea     Follow-up:     Return in about 2 weeks (around 12/22/2022).    Preventative:  Health Maintenance   Topic Date Due   • COVID-19 Vaccine (1)  Never done   • Pneumococcal Vaccine 0-64 (1 - PCV) Never done   • TDAP/TD VACCINES (1 - Tdap) Never done   • HEPATITIS C SCREENING  Never done   • ANNUAL PHYSICAL  Never done   • INFLUENZA VACCINE  08/01/2022   • COLORECTAL CANCER SCREENING  06/07/2024     Male Preventative: Exercises regularly      Alcohol use:  reports that he does not currently use alcohol.  Nicotine status  reports that he has been smoking cigarettes. He has a 10.00 pack-year smoking history. He has never used smokeless tobacco.     Goals     •  Sarcoidosis Management (pt-stated)       Barrier to goal:   1. Lack of health insurance.  2. Lost to follow up.            RISK SCORE: 2        This document has been electronically signed by Reanna Bess MD on December 8, 2022 16:25 CST

## 2022-12-09 LAB
ALBUMIN SERPL-MCNC: 4.4 G/DL (ref 3.5–5.2)
ALBUMIN/GLOB SERPL: 1.6 G/DL
ALP SERPL-CCNC: 84 U/L (ref 39–117)
ALT SERPL W P-5'-P-CCNC: 20 U/L (ref 1–41)
ANION GAP SERPL CALCULATED.3IONS-SCNC: 6 MMOL/L (ref 5–15)
AST SERPL-CCNC: 21 U/L (ref 1–40)
BILIRUB SERPL-MCNC: 0.4 MG/DL (ref 0–1.2)
BUN SERPL-MCNC: 15 MG/DL (ref 6–20)
BUN/CREAT SERPL: 13 (ref 7–25)
CALCIUM SPEC-SCNC: 9.3 MG/DL (ref 8.6–10.5)
CHLORIDE SERPL-SCNC: 103 MMOL/L (ref 98–107)
CHOLEST SERPL-MCNC: 156 MG/DL (ref 0–200)
CO2 SERPL-SCNC: 29 MMOL/L (ref 22–29)
CREAT SERPL-MCNC: 1.15 MG/DL (ref 0.76–1.27)
EGFRCR SERPLBLD CKD-EPI 2021: 80.5 ML/MIN/1.73
GLOBULIN UR ELPH-MCNC: 2.7 GM/DL
GLUCOSE SERPL-MCNC: 98 MG/DL (ref 65–99)
HCV AB SER DONR QL: NORMAL
HDLC SERPL-MCNC: 49 MG/DL (ref 40–60)
LDLC SERPL CALC-MCNC: 90 MG/DL (ref 0–100)
LDLC/HDLC SERPL: 1.82 {RATIO}
POTASSIUM SERPL-SCNC: 4.2 MMOL/L (ref 3.5–5.2)
PROT SERPL-MCNC: 7.1 G/DL (ref 6–8.5)
SODIUM SERPL-SCNC: 138 MMOL/L (ref 136–145)
TRIGL SERPL-MCNC: 88 MG/DL (ref 0–150)
TSH SERPL DL<=0.05 MIU/L-ACNC: 0.73 UIU/ML (ref 0.27–4.2)
VLDLC SERPL-MCNC: 17 MG/DL (ref 5–40)

## 2023-01-23 ENCOUNTER — OFFICE VISIT (OUTPATIENT)
Dept: FAMILY MEDICINE CLINIC | Facility: CLINIC | Age: 43
End: 2023-01-23
Payer: COMMERCIAL

## 2023-01-23 VITALS
SYSTOLIC BLOOD PRESSURE: 120 MMHG | WEIGHT: 206 LBS | OXYGEN SATURATION: 100 % | HEIGHT: 70 IN | HEART RATE: 84 BPM | BODY MASS INDEX: 29.49 KG/M2 | TEMPERATURE: 96.2 F | DIASTOLIC BLOOD PRESSURE: 80 MMHG

## 2023-01-23 DIAGNOSIS — I10 ESSENTIAL HYPERTENSION, BENIGN: ICD-10-CM

## 2023-01-23 DIAGNOSIS — R73.03 PRE-DIABETES: ICD-10-CM

## 2023-01-23 DIAGNOSIS — F90.9 ATTENTION DEFICIT HYPERACTIVITY DISORDER (ADHD), UNSPECIFIED ADHD TYPE: Primary | ICD-10-CM

## 2023-01-23 DIAGNOSIS — K21.9 GASTROESOPHAGEAL REFLUX DISEASE, UNSPECIFIED WHETHER ESOPHAGITIS PRESENT: ICD-10-CM

## 2023-01-23 DIAGNOSIS — G47.33 OSA (OBSTRUCTIVE SLEEP APNEA): ICD-10-CM

## 2023-01-23 PROCEDURE — 99213 OFFICE O/P EST LOW 20 MIN: CPT

## 2023-01-23 RX ORDER — SUCRALFATE 1 G/1
1 TABLET ORAL 4 TIMES DAILY
Qty: 120 TABLET | Refills: 2 | Status: SHIPPED | OUTPATIENT
Start: 2023-01-23 | End: 2023-02-21 | Stop reason: SDUPTHER

## 2023-01-23 RX ORDER — VILOXAZINE HYDROCHLORIDE 200 MG/1
200 CAPSULE, EXTENDED RELEASE ORAL DAILY
Qty: 30 CAPSULE | Refills: 0 | Status: SHIPPED | OUTPATIENT
Start: 2023-01-23 | End: 2023-01-27 | Stop reason: SDUPTHER

## 2023-01-24 PROBLEM — R73.03 PRE-DIABETES: Status: ACTIVE | Noted: 2023-01-24

## 2023-01-24 NOTE — PROGRESS NOTES
"  Family Medicine Residency  Reanna Bess MD    Subjective:     Nitin Jackson is a 42 y.o. male who presents for follow up on GERD, sleep apnea, and ADHD. 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 Patient has not been on medications for quite some time due to being incarcerated and is interested in treatment options. He states he finds it difficult to study and focus on tasks at hand. He also endorses his GERD has worsened despite taking protonix and pepcid daily. He feels his symptoms are worse at night and feel food is getting \"stuck\" in what he describes as epigastric region. He also endorses he believes his sleep apnea is worsening however he has an appointment with sleep medicine 1/25.     The following portions of the patient's history were reviewed and updated as appropriate: allergies, current medications, past family history, past medical history, past social history, past surgical history and problem list.    Past Medical Hx:  Past Medical History:   Diagnosis Date   • Allergic rhinitis    • Asthma    • Chronic dermatitis    • Chronic right shoulder pain    • Cigarette nicotine dependence, uncomplicated 8/28/2019   • Cigarette smoker 8/24/2016   • Cutaneous sarcoidosis 8/24/2016   • DJD (degenerative joint disease)     shoulder region, right side   • Dyspnea on exertion    • Elevated blood pressure    • Encounter for routine adult health examination    • Fatigue    • Hypertension     \"NOT TAKING MEDS THAT WERE PRESCRIBED\"   • Obstructive sleep apnea    • Obstructive sleep apnea syndrome 8/24/2016   • Pain in left knee    • Vertigo        Past Surgical Hx:  Past Surgical History:   Procedure Laterality Date   • BREAST LUMPECTOMY WITH AXILLARY NODE DISSECTION Left 8/22/2017    Procedure: LEFT AXILLARY LYMPH NODE BIOPSYl;  Surgeon: Bryan Arnold MD;  Location: [facility];  Service:    • COLONOSCOPY N/A 6/7/2021    " Procedure: COLONOSCOPY;  Surgeon: Smitha Amaya MD;  Location: [facility] ENDOSCOPY;  Service: Gastroenterology;  Laterality: N/A;   • ENDOSCOPY N/A 6/7/2021    Procedure: ESOPHAGOGASTRODUODENOSCOPY;  Surgeon: Smitha Amaya MD;  Location: [facility] ENDOSCOPY;  Service: Gastroenterology;  Laterality: N/A;   • INJECTION OF MEDICATION  08/10/2016    Kenalog   • SHOULDER ARTHROSCOPY  01/13/2015    Arthroscopy of shoulder, subacromial decompression, Reed, and injection of the shoulder with 80 ml Kenalog   • SHOULDER SURGERY     • UPPER GASTROINTESTINAL ENDOSCOPY  06/07/2021   • WISDOM TOOTH EXTRACTION      2 removed       Current Meds:    Current Outpatient Medications:   •  albuterol sulfate  (90 Base) MCG/ACT inhaler, Inhale 2 puffs Every 4 (Four) Hours As Needed for Wheezing or Shortness of Air., Disp: 18 g, Rfl: 2  •  chlorthalidone (HYGROTON) 25 MG tablet, Take 0.5 tablets by mouth Daily., Disp: 30 tablet, Rfl: 1  •  famotidine (PEPCID) 20 MG tablet, Take 2 tablets by mouth 2 (Two) Times a Day As Needed for Heartburn., Disp: 120 tablet, Rfl: 2  •  montelukast (SINGULAIR) 10 MG tablet, Take 1 tablet by mouth Every Night., Disp: 30 tablet, Rfl: 11  •  pantoprazole (PROTONIX) 40 MG EC tablet, Take 1 tablet by mouth Daily., Disp: 30 tablet, Rfl: 2  •  fluticasone (Flonase) 50 MCG/ACT nasal spray, 2 sprays into the nostril(s) as directed by provider Daily for 30 days., Disp: , Rfl:   •  sucralfate (Carafate) 1 g tablet, Take 1 tablet by mouth 4 (Four) Times a Day., Disp: 120 tablet, Rfl: 2  •  Viloxazine HCl ER (Qelbree) 200 MG capsule sustained-release 24 hr, Take 1 capsule by mouth Daily., Disp: 30 capsule, Rfl: 0    Allergies:  No Known Allergies    Family Hx:  Family History   Problem Relation Age of Onset   • Diabetes Other    • Heart disease Other    • Hypertension Other    • No Known Problems Mother    • No Known Problems Father    • No Known Problems Sister    • Heart block Maternal Grandmother    •  "Lung cancer Paternal Grandmother    • Lung cancer Paternal Grandfather    • No Known Problems Half-Brother    • No Known Problems Half-Brother    • No Known Problems Sister    • No Known Problems Daughter    • No Known Problems Daughter    • No Known Problems Son    • No Known Problems Son         Social History:  Social History     Socioeconomic History   • Marital status:    Tobacco Use   • Smoking status: Some Days     Packs/day: 0.50     Years: 20.00     Pack years: 10.00     Types: Cigarettes   • Smokeless tobacco: Never   • Tobacco comments:     11/09/2021 - Patient states he utilizes 3 Cigarettes per day on the days he smokes.   Vaping Use   • Vaping Use: Former   • Substances: Nicotine   • Devices: Disposable   Substance and Sexual Activity   • Alcohol use: Not Currently     Comment: 11/09/2021 - Patient states he consumed alcoholic beverages socially.   • Drug use: Not Currently     Types: Marijuana   • Sexual activity: Defer     Comment: .       Review of Systems  Review of Systems   Constitutional: Negative.    HENT: Negative.    Eyes: Negative.    Respiratory: Negative.    Cardiovascular: Negative.    Gastrointestinal: Positive for abdominal pain.   Genitourinary: Negative.    Musculoskeletal: Positive for back pain.   Neurological: Negative.    Psychiatric/Behavioral: Positive for decreased concentration. The patient is hyperactive.        Objective:     /80   Pulse 84   Temp 96.2 °F (35.7 °C)   Ht 177.8 cm (70\")   Wt 93.4 kg (206 lb)   SpO2 100%   BMI 29.56 kg/m²   Physical Exam  Constitutional:       Appearance: Normal appearance.   HENT:      Mouth/Throat:      Mouth: Mucous membranes are moist.      Pharynx: Oropharynx is clear.   Eyes:      Extraocular Movements: Extraocular movements intact.      Conjunctiva/sclera: Conjunctivae normal.   Cardiovascular:      Rate and Rhythm: Normal rate and regular rhythm.      Pulses: Normal pulses.      Heart sounds: Normal heart " sounds.   Pulmonary:      Effort: Pulmonary effort is normal.      Breath sounds: Normal breath sounds.   Abdominal:      General: Abdomen is flat. Bowel sounds are normal.      Palpations: Abdomen is soft.   Skin:     General: Skin is warm.   Neurological:      Mental Status: He is alert and oriented to person, place, and time.   Psychiatric:         Mood and Affect: Mood normal.         Behavior: Behavior normal.         Thought Content: Thought content normal.         Judgment: Judgment normal.          Assessment/Plan:     Diagnoses and all orders for this visit:    1. Gastroesophageal reflux disease, unspecified whether esophagitis present (Primary)  -     H. Pylori Breath Test - Breath, Lung; Future  -     sucralfate (Carafate) 1 g tablet; Take 1 tablet by mouth 4 (Four) Times a Day.  Dispense: 120 tablet; Refill: 2  -     Ambulatory Referral to Gastroenterology  -     Condition worsening per patient     2. Attention deficit hyperactivity disorder (ADHD), unspecified ADHD type  -     Viloxazine HCl ER (Qelbree) 200 MG capsule sustained-release 24 hr; Take 1 capsule by mouth Daily.  Dispense: 30 capsule; Refill: 0    3. Essential hypertension, benign      - Controlled on chlorthalidone 25mg     4. SHE (obstructive sleep apnea)        - Has sleep medicine appointment 1/25     Plan:  - GI referral; ordered H.pylori breath test and carafate to control symptoms until appointment is made. Patient has failed protonix and pepcid. Follow up at next appointment   - Advised patient to keep sleep medicine appointment. Follow up at next appointment and review sleep medicine notes.  - Discussed with patient risks and benefits of medication options for ADHD. Advised against stimulant medications with hypertension history. Patient is open to trying nonstimulant option such as Qelbree. Discussed importance of 1 month follow up to assess for side effects and assure medication is effective. Advised patient to call office if he  experiences palpitations, headaches, loss of appetite, or elevated BP.   - Reviewed A1c obtained at last visit with patient, discussed importance of lifestyle changes to reduce A1c. Patient is not interested in medical treatment at this time. Recheck in 3 months.  Lab Results   Component Value Date    HGBA1C 6.10 (H) 12/08/2022           · Rx changes: See A/P  · Patient Education: See A/P  · Compliance at present is estimated to be satisfactory.   · Efforts to improve compliance (if necessary) will be directed at increased exercise.       Follow-up:     Return in about 4 weeks (around 2/20/2023).    Preventative:  Health Maintenance   Topic Date Due   • COVID-19 Vaccine (1) Never done   • Pneumococcal Vaccine 0-64 (1 - PCV) Never done   • TDAP/TD VACCINES (1 - Tdap) Never done   • ANNUAL PHYSICAL  Never done   • INFLUENZA VACCINE  08/01/2022   • COLORECTAL CANCER SCREENING  06/07/2024   • HEPATITIS C SCREENING  Completed         Alcohol use:  reports that he does not currently use alcohol.  Nicotine status  reports that he has been smoking cigarettes. He has a 10.00 pack-year smoking history. He has never used smokeless tobacco.     Goals     •  Sarcoidosis Management (pt-stated)       Barrier to goal:   1. Lack of health insurance.  2. Lost to follow up.            RISK SCORE: 3        This document has been electronically signed by Reanna Bess MD on January 27, 2023 08:49 CST

## 2023-01-25 ENCOUNTER — OFFICE VISIT (OUTPATIENT)
Dept: SLEEP MEDICINE | Facility: HOSPITAL | Age: 43
End: 2023-01-25
Payer: COMMERCIAL

## 2023-01-25 VITALS
HEART RATE: 85 BPM | WEIGHT: 202 LBS | BODY MASS INDEX: 28.92 KG/M2 | DIASTOLIC BLOOD PRESSURE: 116 MMHG | SYSTOLIC BLOOD PRESSURE: 159 MMHG | HEIGHT: 70 IN | OXYGEN SATURATION: 97 %

## 2023-01-25 DIAGNOSIS — G47.33 OBSTRUCTIVE SLEEP APNEA: Primary | ICD-10-CM

## 2023-01-25 DIAGNOSIS — J35.1 ENLARGED TONSILS: ICD-10-CM

## 2023-01-25 DIAGNOSIS — G47.19 EXCESSIVE DAYTIME SLEEPINESS: ICD-10-CM

## 2023-01-25 DIAGNOSIS — R06.83 SNORING: ICD-10-CM

## 2023-01-25 PROCEDURE — 99203 OFFICE O/P NEW LOW 30 MIN: CPT | Performed by: NURSE PRACTITIONER

## 2023-01-25 NOTE — PROGRESS NOTES
New Patient Sleep Medicine Consultation    Encounter Date: 1/25/2023         Patient's Primary Care Provider: Reanna Bess MD  Referring Provider: Reanna Bess*  Reason for consultation/chief complaint: known SHE, previously on CPAP - has not been on for ~2 years, re-establishing care    Nitin Jackson is a 42 y.o. male who admits to snoring, unrestful sleep, high blood pressure, gasping during sleep, excessive daytime sleepiness, morning headaches, stopping breathing during sleep, sleeping less than 6 hours per night, disturbed or restless sleep, up to bathroom at night and sleepy driving.    He denies cataplexy, sleep paralysis, or hypnagogic hallucinations. His bedtime is ~ 2200. He  falls asleep after 5-10 minutes, and is up 2-3 times per night. He wakes up ~ 3399-0198. He endorses 4 hours of sleep. He drinks 1 cups of coffee, 0 teas, and 2 sodas per day. He drinks 1-2 alcoholic beverages per week. He is not a current smoker. He does not take sedatives or hypnotics. He has occasional sleepiness with driving. He does not nap.     Patient was previously diagnosed with SHE in 2019 and used CPAP for a short time. He stopped using it due to difficulty with his mask as well as pressure intolerance. He is interested in other options for SHE treatment. We discussed evaluation by ENT for possible UPPP, tonsillectomy. Patient wishes to proceed with ENT referral at this time. If he is not eligible for surgical procedures, he wishes to undergo in lab PAP titration see if he could tolerate a BiPAP machine versus a CPAP. If he has PAP trial and failure again, may also consider Inspire therapy.     He has history of pulmonary sarcoidosis. Most recent PFT in 2019.      Zamora - 17  Zamora Sleepiness Scale  Sitting and reading: Moderate chance of dozing  Watching TV: High chance of dozing  Sitting, inactive in a public place (e.g. a theatre or a meeting): Moderate chance of dozing  As a passenger in  "a car for an hour without a break: High chance of dozing  Lying down to rest in the afternoon when circumstances permit: High chance of dozing  Sitting and talking to someone: Slight chance of dozing  Sitting quietly after a lunch without alcohol: Moderate chance of dozing  In a car, while stopped for a few minutes in traffic: Slight chance of dozing  Total score: 17    Prior Sleep Testin. HST on 2019, AHI of 11   2. Previously on 5-20 cm H2O    Past Medical History:   Diagnosis Date   • Allergic rhinitis    • Asthma    • Chronic dermatitis    • Chronic right shoulder pain    • Cigarette nicotine dependence, uncomplicated 2019   • Cigarette smoker 2016   • Cutaneous sarcoidosis 2016   • DJD (degenerative joint disease)     shoulder region, right side   • Dyspnea on exertion    • Elevated blood pressure    • Encounter for routine adult health examination    • Fatigue    • Hypertension     \"NOT TAKING MEDS THAT WERE PRESCRIBED\"   • Obstructive sleep apnea    • Obstructive sleep apnea syndrome 2016   • Pain in left knee    • Vertigo      Social History     Socioeconomic History   • Marital status:    Tobacco Use   • Smoking status: Some Days     Packs/day: 0.50     Years: 20.00     Pack years: 10.00     Types: Cigarettes   • Smokeless tobacco: Never   • Tobacco comments:     2021 - Patient states he utilizes 3 Cigarettes per day on the days he smokes.   Vaping Use   • Vaping Use: Former   • Substances: Nicotine   • Devices: Disposable   Substance and Sexual Activity   • Alcohol use: Not Currently     Comment: 2021 - Patient states he consumed alcoholic beverages socially.   • Drug use: Not Currently     Types: Marijuana   • Sexual activity: Defer     Comment: .     Family History   Problem Relation Age of Onset   • Diabetes Other    • Heart disease Other    • Hypertension Other    • No Known Problems Mother    • No Known Problems Father    • No Known Problems " "Sister    • Heart block Maternal Grandmother    • Lung cancer Paternal Grandmother    • Lung cancer Paternal Grandfather    • No Known Problems Half-Brother    • No Known Problems Half-Brother    • No Known Problems Sister    • No Known Problems Daughter    • No Known Problems Daughter    • No Known Problems Son    • No Known Problems Son      Prior T&A, UPPP, maxillofacial, or bariatric surgery: None  Family history of sleep disorders: Brother - SHE, father - SHE  Other family history + for: As above  Occupation: [employer]  Marital status: Unmarried  Children: 4  Has 2 brothers and 2 sisters  Smoking history: smoked 1 ppds from age 17 until 41      Review of Systems:  Constitutional: positive for fatigue  Ears, nose, mouth, throat, and face: positive for snoring  Respiratory: positive for asthma and dyspnea on exertion  Cardiovascular: negative  Gastrointestinal: negative  Genitourinary:negative  Musculoskeletal:negative  Neurological: negative  Behavioral/Psych: positive for fatigue and sleep disturbance  Patient advised to discuss any positive ROS with PCP.      Vitals:    01/25/23 0835   BP: (!) 159/116   Pulse: 85   SpO2: 97%           01/25/23  0835   Weight: 91.6 kg (202 lb)       Body mass index is 28.98 kg/m². BMI is >= 25 and <30. (Overweight) The following options were offered after discussion;: referral to primary care    Tobacco Use: High Risk   • Smoking Tobacco Use: Some Days   • Smokeless Tobacco Use: Never   • Passive Exposure: Not on file         Physical Exam:        General: Alert. Cooperative. Well developed. No acute distress.   Head/Neck:  Normocephalic. Symmetrical. Atraumatic.     Neck circumference: 17\"             Eyes: Sclera clear. No icterus. PERRLA. Normal EOM.             Ears: No deformities. Normal hearing.             Nose: No septal deviation. No drainage.          Throat: No oral lesions. No thrush. Moist mucous membranes. Trachea midline.    Tongue is enlarged     Dentition is " good       Pharynx: Posterior pharyngeal pillars are narrow    Mallampati score of II (hard and soft palate, upper portion of tonsils anduvula visible)    Pharynx is erythematous, with both tonsils moderately enlarged   Chest Wall:  Normal shape. Symmetric expansion with respiration. No tenderness.          Lungs:  Clear to auscultation bilaterally. No wheezes. No rhonchi. No rales. Respirations regular, even and unlabored.            Heart:  Regular rhythm and normal rate. Normal S1 and S2. No murmur.     Abdomen:  Soft, non-tender and non-distended. Normal bowel sounds. No masses.  Extremities:  Moves all extremities well. No edema.           Pulses: Pulses palpable and equal bilaterally.               Skin: Dry. Intact. No bleeding. No rash.           Neuro: Moves all 4 extremities and cranial nerves grossly intact.  Psychiatric: Normal mood and affect.      Current Outpatient Medications:   •  albuterol sulfate  (90 Base) MCG/ACT inhaler, Inhale 2 puffs Every 4 (Four) Hours As Needed for Wheezing or Shortness of Air., Disp: 18 g, Rfl: 2  •  chlorthalidone (HYGROTON) 25 MG tablet, Take 0.5 tablets by mouth Daily., Disp: 30 tablet, Rfl: 1  •  famotidine (PEPCID) 20 MG tablet, Take 2 tablets by mouth 2 (Two) Times a Day As Needed for Heartburn., Disp: 120 tablet, Rfl: 2  •  montelukast (SINGULAIR) 10 MG tablet, Take 1 tablet by mouth Every Night., Disp: 30 tablet, Rfl: 11  •  pantoprazole (PROTONIX) 40 MG EC tablet, Take 1 tablet by mouth Daily., Disp: 30 tablet, Rfl: 2  •  sucralfate (Carafate) 1 g tablet, Take 1 tablet by mouth 4 (Four) Times a Day., Disp: 120 tablet, Rfl: 2  •  Viloxazine HCl ER (Qelbree) 200 MG capsule sustained-release 24 hr, Take 1 capsule by mouth Daily., Disp: 30 capsule, Rfl: 0  •  fluticasone (Flonase) 50 MCG/ACT nasal spray, 2 sprays into the nostril(s) as directed by provider Daily for 30 days., Disp: , Rfl:     WBC   Date Value Ref Range Status   12/08/2022 6.25 3.40 - 10.80  10*3/mm3 Final     RBC   Date Value Ref Range Status   12/08/2022 4.79 4.14 - 5.80 10*6/mm3 Final     Hemoglobin   Date Value Ref Range Status   12/08/2022 14.1 13.0 - 17.7 g/dL Final     Hematocrit   Date Value Ref Range Status   12/08/2022 41.6 37.5 - 51.0 % Final     MCV   Date Value Ref Range Status   12/08/2022 86.8 79.0 - 97.0 fL Final     MCH   Date Value Ref Range Status   12/08/2022 29.4 26.6 - 33.0 pg Final     MCHC   Date Value Ref Range Status   12/08/2022 33.9 31.5 - 35.7 g/dL Final     RDW   Date Value Ref Range Status   12/08/2022 13.5 12.3 - 15.4 % Final     RDW-SD   Date Value Ref Range Status   12/08/2022 42.3 37.0 - 54.0 fl Final     MPV   Date Value Ref Range Status   12/08/2022 12.1 (H) 6.0 - 12.0 fL Final     Platelets   Date Value Ref Range Status   12/08/2022 186 140 - 450 10*3/mm3 Final     Neutrophil %   Date Value Ref Range Status   12/08/2022 41.5 (L) 42.7 - 76.0 % Final     Lymphocyte %   Date Value Ref Range Status   12/08/2022 43.5 19.6 - 45.3 % Final     Monocyte %   Date Value Ref Range Status   12/08/2022 11.5 5.0 - 12.0 % Final     Eosinophil %   Date Value Ref Range Status   12/08/2022 3.0 0.3 - 6.2 % Final     Basophil %   Date Value Ref Range Status   12/08/2022 0.2 0.0 - 1.5 % Final     Immature Grans %   Date Value Ref Range Status   12/08/2022 0.3 0.0 - 0.5 % Final     Neutrophils, Absolute   Date Value Ref Range Status   12/08/2022 2.59 1.70 - 7.00 10*3/mm3 Final     Lymphocytes, Absolute   Date Value Ref Range Status   12/08/2022 2.72 0.70 - 3.10 10*3/mm3 Final     Monocytes, Absolute   Date Value Ref Range Status   12/08/2022 0.72 0.10 - 0.90 10*3/mm3 Final     Eosinophils, Absolute   Date Value Ref Range Status   12/08/2022 0.19 0.00 - 0.40 10*3/mm3 Final     Basophils, Absolute   Date Value Ref Range Status   12/08/2022 0.01 0.00 - 0.20 10*3/mm3 Final     Immature Grans, Absolute   Date Value Ref Range Status   12/08/2022 0.02 0.00 - 0.05 10*3/mm3 Final     nRBC   Date  Value Ref Range Status   12/08/2022 0.0 0.0 - 0.2 /100 WBC Final     Lab Results   Component Value Date    GLUCOSE 98 12/08/2022    BUN 15 12/08/2022    CREATININE 1.15 12/08/2022    EGFRIFAFRI 93 05/30/2020    BCR 13.0 12/08/2022    K 4.2 12/08/2022    CO2 29.0 12/08/2022    CALCIUM 9.3 12/08/2022    ALBUMIN 4.40 12/08/2022    AST 21 12/08/2022    ALT 20 12/08/2022         ASSESSMENT:  1. Obstructive sleep apnea - New (to me), additional work-up planned (4)  1. Referral to ENT for evaluation for possible UPPP/tonsillectomy, etc.  2. If no surgical procedure is required, proceed with in lab PAP titration study  3. Could consider Inspire if patient is still unable to tolerate CPAP and no other contraindications  4. Pertinent labs were reviewed as listed above  5. Follow up within ~6 weeks  6. Drowsy driving tips - do not drive if drowsy      I spent 30 minutes caring for Nitin on this date of service. This time includes time spent by me in the following activities: preparing for the visit, reviewing tests, obtaining and/or reviewing a separately obtained history, performing a medically appropriate examination and/or evaluation , counseling and educating the patient/family/caregiver, ordering medications, tests, or procedures, referring and communicating with other health care professionals , documenting information in the medical record and care coordination; discussing PAP therapy and Further testing    RTC in 6 weeks. Patient agrees to return sooner if changes in symptoms.           This document has been electronically signed by ENE Malone on January 25, 2023 08:53 CST          CC: Reanna Bess MD Montz, Catherine Shelby*

## 2023-01-27 DIAGNOSIS — F90.9 ATTENTION DEFICIT HYPERACTIVITY DISORDER (ADHD), UNSPECIFIED ADHD TYPE: ICD-10-CM

## 2023-01-27 RX ORDER — VILOXAZINE HYDROCHLORIDE 200 MG/1
200 CAPSULE, EXTENDED RELEASE ORAL DAILY
Qty: 30 CAPSULE | Refills: 0 | Status: SHIPPED | OUTPATIENT
Start: 2023-01-27

## 2023-01-30 NOTE — PROGRESS NOTES
I have seen the patient.  I have reviewed the notes, assessments, and/or procedures performed by Reanna Bess MD during office visit I concur with her/his documentation and assessment and plan for Nitin Jackson.            This document has been electronically signed by Zander Serrano MD on January 30, 2023 09:44 CST

## 2023-02-06 ENCOUNTER — OFFICE VISIT (OUTPATIENT)
Dept: OTOLARYNGOLOGY | Facility: CLINIC | Age: 43
End: 2023-02-06
Payer: COMMERCIAL

## 2023-02-06 ENCOUNTER — OFFICE VISIT (OUTPATIENT)
Dept: GASTROENTEROLOGY | Facility: CLINIC | Age: 43
End: 2023-02-06
Payer: COMMERCIAL

## 2023-02-06 VITALS
DIASTOLIC BLOOD PRESSURE: 104 MMHG | HEART RATE: 77 BPM | WEIGHT: 200.6 LBS | SYSTOLIC BLOOD PRESSURE: 148 MMHG | HEIGHT: 70 IN | BODY MASS INDEX: 28.72 KG/M2

## 2023-02-06 VITALS — HEART RATE: 85 BPM | WEIGHT: 200.8 LBS | BODY MASS INDEX: 28.75 KG/M2 | HEIGHT: 70 IN | OXYGEN SATURATION: 97 %

## 2023-02-06 DIAGNOSIS — G47.33 OSA (OBSTRUCTIVE SLEEP APNEA): Primary | ICD-10-CM

## 2023-02-06 DIAGNOSIS — R10.13 EPIGASTRIC PAIN: Primary | ICD-10-CM

## 2023-02-06 DIAGNOSIS — J34.89 NASAL DRYNESS: ICD-10-CM

## 2023-02-06 DIAGNOSIS — R11.0 NAUSEA: ICD-10-CM

## 2023-02-06 PROCEDURE — 99203 OFFICE O/P NEW LOW 30 MIN: CPT | Performed by: OTOLARYNGOLOGY

## 2023-02-06 PROCEDURE — 99214 OFFICE O/P EST MOD 30 MIN: CPT | Performed by: INTERNAL MEDICINE

## 2023-02-06 RX ORDER — DEXTROSE AND SODIUM CHLORIDE 5; .45 G/100ML; G/100ML
30 INJECTION, SOLUTION INTRAVENOUS CONTINUOUS PRN
Status: CANCELLED | OUTPATIENT
Start: 2023-02-14

## 2023-02-06 RX ORDER — SODIUM CHLORIDE 9 MG/ML
40 INJECTION, SOLUTION INTRAVENOUS AS NEEDED
Status: CANCELLED | OUTPATIENT
Start: 2023-02-14

## 2023-02-06 NOTE — PROGRESS NOTES
Chief complaint: Sleep apnea    Assessment and Plan:  42-year-old male with mild obstructive sleep apnea, narrow oropharynx, large tonsils and long palate    -We did discuss that there are multiple interventions possible for obstructive sleep apnea including inspire implantation, for which he is not a candidate given his AHI, PAP therapy, which is the gold standard of treatment, UPPP, and oral appliance therapy.  -We discussed that given his oral anatomy and his AHI and BMI, he would be a good candidate for UPPP, however the risks and benefits of this procedure include significant discomfort with recovery, and the potential for weaning benefit over time as the muscles relax over several years.  We did discuss that this is not a lifetime curative procedure but often just decreases the AHI, in his case this may be curative for some time, but I would expect that over several years his sleep apnea would return and he would again require PAP therapy.  -The patient preference at this time is to undergo reevaluation for PAP therapy and not undergo surgical intervention  -We discussed for his nasal congestion his nose appears dry and there is a mild left nasal septal deviation, I would recommend stopping Flonase and starting nasal saline spray to help increase moisturization, as his nose appears dry  -We also discussed the risks of untreated sleep apnea including significant cardiopulmonary injury leading to hypertension, increased risk of heart attack, and increased risk of stroke over his lifetime  -Follow-up as needed or if interested in surgical intervention    History of present illness:    Mr. Jackson is a 42-year-old male presenting today for evaluation of surgical management of obstructive sleep apnea.  He was previously seen by sleep medicine and was previously on CPAP therapy, but he states he felt it difficult to tolerate the mask and felt like he was having some difficulty with nasal breathing at that time, in  2019.  He also notes nasal obstruction that is intermittent in nature and only on the left side.  He has been using Flonase once daily for the last year and does not notice a change in his symptoms with this.  He notes he will occasionally gag on food, but has no coughing with liquids, no history of recurrent strep or recurrent sore throats.  No tonsilliths.  He denies any epistaxis, history of nasal trauma, or history of nasal surgery.  No other acute ENT concerns today.    Vital Signs   Vitals:    02/06/23 1044   Pulse: 85   SpO2: 97%     Physical Exam:  General: NAD, awake and alert  Head: normocephalic, atraumatic  Eyes: EOMI, sclerae white, conjunctivae pink  Ears: pinnae intact without masses or lesions   Right: TM intact without injection or effusion   Left: TM intact without injection or effusion  Nose: external straight without deformity   Mucosa pink, not edematous. No polyps seen. No purulence.  Dry   Septum: Mild left nasal septal deviation without obstruction   Turbinates: not hypertrophied  OC/OP: mucosa moist and pink, no masses or lesions, tongue is midline and mobile. Tonsils 2+ without exudate. Uvula single and elevates symmetrically.  Elongated soft palate and uvula, extremely narrow oropharyngeal caliber  Neck: supple, no masses or lesions.  Neuro:  no focal deficits    Results Review:  Review of sleep medicine nurse practitioner visit from 1/25/2023 reviewed today and demonstrates at that time unknown diagnosis of obstructive sleep apnea previously treated with CPAP reestablishing care.  Previous sleep testing demonstrated an AHI of 11, previous PAP therapy was 5 to 20 cm of H2O.    Review of Systems:  Positive ROS items: None noted  Otherwise, a 14 system ROS is negative except as pertinent positives are mentioned above.    Histories:  No Known Allergies    Prior to Admission medications    Medication Sig Start Date End Date Taking? Authorizing Provider   albuterol sulfate  (90 Base)  "MCG/ACT inhaler Inhale 2 puffs Every 4 (Four) Hours As Needed for Wheezing or Shortness of Air. 12/8/22  Yes Zander Serrano MD   famotidine (PEPCID) 20 MG tablet Take 2 tablets by mouth 2 (Two) Times a Day As Needed for Heartburn. 12/8/22  Yes Zander Serrano MD   fluticasone (Flonase) 50 MCG/ACT nasal spray 2 sprays into the nostril(s) as directed by provider Daily for 30 days. 12/8/22 2/6/23 Yes Zander Serrano MD   montelukast (SINGULAIR) 10 MG tablet Take 1 tablet by mouth Every Night. 12/8/22  Yes Zander Serrano MD   pantoprazole (PROTONIX) 40 MG EC tablet Take 1 tablet by mouth Daily. 12/8/22  Yes Zander Serrano MD   sucralfate (Carafate) 1 g tablet Take 1 tablet by mouth 4 (Four) Times a Day. 1/23/23  Yes Aelxis Stearns MD   Viloxazine HCl ER (Qelbree) 200 MG capsule sustained-release 24 hr Take 1 capsule by mouth Daily. 1/27/23  Yes Alexis Stearns MD   chlorthalidone (HYGROTON) 25 MG tablet Take 0.5 tablets by mouth Daily. 12/8/22   Zander Serrano MD       Past Medical History:   Diagnosis Date   • Allergic rhinitis    • Asthma    • Chronic dermatitis    • Chronic right shoulder pain    • Cigarette nicotine dependence, uncomplicated 8/28/2019   • Cigarette smoker 8/24/2016   • Cutaneous sarcoidosis 8/24/2016   • DJD (degenerative joint disease)     shoulder region, right side   • Dyspnea on exertion    • Elevated blood pressure    • Encounter for routine adult health examination    • Fatigue    • Hypertension     \"NOT TAKING MEDS THAT WERE PRESCRIBED\"   • Obstructive sleep apnea    • Obstructive sleep apnea syndrome 8/24/2016   • Pain in left knee    • Vertigo        Past Surgical History:   Procedure Laterality Date   • BREAST LUMPECTOMY WITH AXILLARY NODE DISSECTION Left 8/22/2017    Procedure: LEFT AXILLARY LYMPH NODE BIOPSYl;  Surgeon: Bryan Arnold MD;  Location: [facility];  Service:    • COLONOSCOPY N/A 6/7/2021    Procedure: COLONOSCOPY;  Surgeon: Smitha Amaya MD;  " Location: [facility] ENDOSCOPY;  Service: Gastroenterology;  Laterality: N/A;   • ENDOSCOPY N/A 6/7/2021    Procedure: ESOPHAGOGASTRODUODENOSCOPY;  Surgeon: Smitha Amaya MD;  Location: [facility] ENDOSCOPY;  Service: Gastroenterology;  Laterality: N/A;   • INJECTION OF MEDICATION  08/10/2016    Kenalog   • SHOULDER ARTHROSCOPY  01/13/2015    Arthroscopy of shoulder, subacromial decompression, Reed, and injection of the shoulder with 80 ml Kenalog   • SHOULDER SURGERY     • UPPER GASTROINTESTINAL ENDOSCOPY  06/07/2021   • WISDOM TOOTH EXTRACTION      2 removed       Social History     Socioeconomic History   • Marital status:    Tobacco Use   • Smoking status: Some Days     Packs/day: 0.50     Years: 20.00     Pack years: 10.00     Types: Cigarettes   • Smokeless tobacco: Never   • Tobacco comments:     11/09/2021 - Patient states he utilizes 3 Cigarettes per day on the days he smokes.   Vaping Use   • Vaping Use: Former   • Substances: Nicotine   • Devices: Disposable   Substance and Sexual Activity   • Alcohol use: Not Currently     Comment: 11/09/2021 - Patient states he consumed alcoholic beverages socially.   • Drug use: Not Currently     Types: Marijuana   • Sexual activity: Defer     Comment: .       Family History   Problem Relation Age of Onset   • Diabetes Other    • Heart disease Other    • Hypertension Other    • No Known Problems Mother    • No Known Problems Father    • No Known Problems Sister    • Heart block Maternal Grandmother    • Lung cancer Paternal Grandmother    • Lung cancer Paternal Grandfather    • No Known Problems Half-Brother    • No Known Problems Half-Brother    • No Known Problems Sister    • No Known Problems Daughter    • No Known Problems Daughter    • No Known Problems Son    • No Known Problems Son              This document has been electronically signed by Veronica Stapleton MD on February 6, 2023 10:46 CST

## 2023-02-08 DIAGNOSIS — G47.19 EXCESSIVE DAYTIME SLEEPINESS: ICD-10-CM

## 2023-02-08 DIAGNOSIS — R06.83 SNORING: ICD-10-CM

## 2023-02-08 DIAGNOSIS — G47.33 OBSTRUCTIVE SLEEP APNEA: Primary | ICD-10-CM

## 2023-02-08 NOTE — PROGRESS NOTES
Consult note received from ENT Dr. Stapleton. Risk/benefit of UPPP/tonsillectomy explained to the patient as treatment for recurrent tonsillitis as well as SHE. Patient did not wish to proceed with surgical intervention at this time. He is interested in retrying PAP therapy. He failed CPAP before and would likely benefit from BiPAP therapy. I will order a PAP titration study to evaluate which type of therapy/pressure patient may benefit from.   Patient is not interested in Inspire therapy at this time.

## 2023-02-14 ENCOUNTER — ANESTHESIA EVENT (OUTPATIENT)
Dept: GASTROENTEROLOGY | Facility: HOSPITAL | Age: 43
End: 2023-02-14
Payer: COMMERCIAL

## 2023-02-14 ENCOUNTER — HOSPITAL ENCOUNTER (OUTPATIENT)
Facility: HOSPITAL | Age: 43
Setting detail: HOSPITAL OUTPATIENT SURGERY
Discharge: HOME | End: 2023-02-14
Attending: INTERNAL MEDICINE | Admitting: INTERNAL MEDICINE
Payer: COMMERCIAL

## 2023-02-14 ENCOUNTER — ANESTHESIA (OUTPATIENT)
Dept: GASTROENTEROLOGY | Facility: HOSPITAL | Age: 43
End: 2023-02-14
Payer: COMMERCIAL

## 2023-02-14 VITALS
RESPIRATION RATE: 18 BRPM | TEMPERATURE: 99.3 F | HEIGHT: 70 IN | BODY MASS INDEX: 28.8 KG/M2 | OXYGEN SATURATION: 94 % | SYSTOLIC BLOOD PRESSURE: 99 MMHG | WEIGHT: 201.2 LBS | HEART RATE: 100 BPM | DIASTOLIC BLOOD PRESSURE: 61 MMHG

## 2023-02-14 DIAGNOSIS — R11.0 NAUSEA: ICD-10-CM

## 2023-02-14 DIAGNOSIS — R10.13 EPIGASTRIC PAIN: ICD-10-CM

## 2023-02-14 PROCEDURE — 43239 EGD BIOPSY SINGLE/MULTIPLE: CPT | Performed by: INTERNAL MEDICINE

## 2023-02-14 PROCEDURE — 25010000002 PROPOFOL 10 MG/ML EMULSION

## 2023-02-14 RX ORDER — SODIUM CHLORIDE 9 MG/ML
40 INJECTION, SOLUTION INTRAVENOUS AS NEEDED
Status: DISCONTINUED | OUTPATIENT
Start: 2023-02-14 | End: 2023-02-14 | Stop reason: HOSPADM

## 2023-02-14 RX ORDER — LIDOCAINE HYDROCHLORIDE 20 MG/ML
INJECTION, SOLUTION INTRAVENOUS AS NEEDED
Status: DISCONTINUED | OUTPATIENT
Start: 2023-02-14 | End: 2023-02-14 | Stop reason: SURG

## 2023-02-14 RX ORDER — DEXTROSE AND SODIUM CHLORIDE 5; .45 G/100ML; G/100ML
30 INJECTION, SOLUTION INTRAVENOUS CONTINUOUS PRN
Status: DISCONTINUED | OUTPATIENT
Start: 2023-02-14 | End: 2023-02-14 | Stop reason: HOSPADM

## 2023-02-14 RX ORDER — PROPOFOL 10 MG/ML
VIAL (ML) INTRAVENOUS AS NEEDED
Status: DISCONTINUED | OUTPATIENT
Start: 2023-02-14 | End: 2023-02-14 | Stop reason: SURG

## 2023-02-14 RX ADMIN — LIDOCAINE HYDROCHLORIDE 100 MG: 20 INJECTION, SOLUTION INTRAVENOUS at 14:05

## 2023-02-14 RX ADMIN — PROPOFOL 50 MG: 10 INJECTION, EMULSION INTRAVENOUS at 14:07

## 2023-02-14 RX ADMIN — DEXTROSE AND SODIUM CHLORIDE: 5; 450 INJECTION, SOLUTION INTRAVENOUS at 14:02

## 2023-02-14 RX ADMIN — PROPOFOL 50 MG: 10 INJECTION, EMULSION INTRAVENOUS at 14:08

## 2023-02-14 RX ADMIN — PROPOFOL 100 MG: 10 INJECTION, EMULSION INTRAVENOUS at 14:06

## 2023-02-14 RX ADMIN — PROPOFOL 50 MG: 10 INJECTION, EMULSION INTRAVENOUS at 14:09

## 2023-02-14 NOTE — ANESTHESIA POSTPROCEDURE EVALUATION
Patient: Nitin Jackson    Procedure Summary     Date: 02/14/23 Room / Location: [facility] ENDOSCOPY 2 / [facility] ENDOSCOPY    Anesthesia Start: 1402 Anesthesia Stop: 1410    Procedure: ESOPHAGOGASTRODUODENOSCOPY Diagnosis:       Epigastric pain      Nausea      (Epigastric pain [R10.13])      (Nausea [R11.0])    Surgeons: Smitha Amaya MD Provider: Oksana Rice CRNA    Anesthesia Type: general ASA Status: 3          Anesthesia Type: general    Vitals  No vitals data found for the desired time range.          Post Anesthesia Care and Evaluation    Patient location during evaluation: bedside  Patient participation: waiting for patient participation  Level of consciousness: responsive to verbal stimuli  Pain management: adequate    Airway patency: patent  Anesthetic complications: No anesthetic complications  PONV Status: none  Cardiovascular status: acceptable  Respiratory status: acceptable  Hydration status: acceptable    Comments: ---------------------------               02/14/23                      1319         ---------------------------   BP:          135/90         Pulse:         76           Resp:          20           Temp:   97.2 °F (36.2 °C)   SpO2:          97%         ---------------------------

## 2023-02-14 NOTE — H&P
"No chief complaint on file.      Subjective    Nitin Jackson is a 42 y.o. male.    History of Present Illness  Patient presented to GI clinic for follow-up visit today.  He feels better currently.  No GI complaints at this time.  Abdominal pain has improved.  GERD is well controlled.  Has elevated blood pressure.       The following portions of the patient's history were reviewed and updated as appropriate:   Past Medical History:   Diagnosis Date   • Allergic rhinitis    • Asthma    • Chronic dermatitis    • Chronic right shoulder pain    • Cigarette nicotine dependence, uncomplicated 08/28/2019   • Cigarette smoker 08/24/2016   • Cutaneous sarcoidosis 08/24/2016   • DJD (degenerative joint disease)     shoulder region, right side   • Dyspnea on exertion    • Elevated blood pressure    • Encounter for routine adult health examination    • Fatigue    • GERD (gastroesophageal reflux disease)    • Hypertension     \"NOT TAKING MEDS THAT WERE PRESCRIBED\"   • Obstructive sleep apnea    • Obstructive sleep apnea syndrome 08/24/2016   • Pain in left knee    • Vertigo      Past Surgical History:   Procedure Laterality Date   • BREAST LUMPECTOMY WITH AXILLARY NODE DISSECTION Left 08/22/2017    Procedure: LEFT AXILLARY LYMPH NODE BIOPSYl;  Surgeon: Bryan Arnold MD;  Location: [facility] OR;  Service:    • COLONOSCOPY N/A 06/07/2021    Procedure: COLONOSCOPY;  Surgeon: Smitha Amaya MD;  Location: [facility] ENDOSCOPY;  Service: Gastroenterology;  Laterality: N/A;   • ENDOSCOPY N/A 06/07/2021    Procedure: ESOPHAGOGASTRODUODENOSCOPY;  Surgeon: Smitha Amaya MD;  Location: [facility] ENDOSCOPY;  Service: Gastroenterology;  Laterality: N/A;   • INJECTION OF MEDICATION  08/10/2016    Kenalog   • SHOULDER ARTHROSCOPY Left 01/13/2015    Arthroscopy of shoulder, subacromial decompression, [place], and injection of the shoulder with 80 ml Kenalog   • UPPER GASTROINTESTINAL ENDOSCOPY  06/07/2021   • WISDOM TOOTH EXTRACTION      2 " removed     Family History   Problem Relation Age of Onset   • Diabetes Other    • Heart disease Other    • Hypertension Other    • No Known Problems Mother    • No Known Problems Father    • No Known Problems Sister    • Heart block Maternal Grandmother    • Lung cancer Paternal Grandmother    • Lung cancer Paternal Grandfather    • No Known Problems Half-Brother    • No Known Problems Half-Brother    • No Known Problems Sister    • No Known Problems Daughter    • No Known Problems Daughter    • No Known Problems Son    • No Known Problems Son        Prior to Admission medications    Medication Sig Start Date End Date Taking? Authorizing Provider   albuterol sulfate  (90 Base) MCG/ACT inhaler Inhale 2 puffs Every 4 (Four) Hours As Needed for Wheezing or Shortness of Air. 6/7/21  Yes Jayro Sanchez III, MD   cetirizine (zyrTEC) 10 MG tablet Take 1 tablet by mouth Daily. 3/19/21  Yes Jayro Sanchez III, MD   chlorthalidone (HYGROTON) 25 MG tablet Take 12.5 mg by mouth Daily.   Yes ProviderJaki MD   famotidine (PEPCID) 20 MG tablet Take 2 tablets by mouth 2 (Two) Times a Day As Needed for Heartburn. 2/2/21  Yes Jayro Sanchez III, MD   indapamide (LOZOL) 1.25 MG tablet Take 1 tablet by mouth Every Morning. 3/19/21  Yes Jayro Sanchez III, MD   montelukast (SINGULAIR) 10 MG tablet Take 1 tablet by mouth Every Night. 3/19/21  Yes Jayro Sanchez III, MD   multivitamin-iron-minerals-folic acid (CENTRUM) chewable tablet Chew 1 tablet Daily.   Yes ProviderJaki MD   pantoprazole (PROTONIX) 40 MG EC tablet Take 1 tablet by mouth Daily. 3/19/21  Yes Jayro Sanchez III, MD   sucralfate (Carafate) 1 GM/10ML suspension Take 10 mL by mouth 4 (Four) Times a Day. 6/14/21  Yes Smitha Amaya MD   fluticasone (Flonase) 50 MCG/ACT nasal spray 2 sprays into the nostril(s) as directed by provider Daily for 30 days. 3/19/21 4/18/21  Jayro Sanchez III, MD     No Known  "Allergies  Social History     Socioeconomic History   • Marital status: Single   Tobacco Use   • Smoking status: Former     Packs/day: 0.50     Years: 20.00     Pack years: 10.00     Types: Cigarettes     Quit date:      Years since quittin.1   • Smokeless tobacco: Never   Vaping Use   • Vaping Use: Former   • Substances: Nicotine   • Devices: Disposable   Substance and Sexual Activity   • Alcohol use: Not Currently     Comment: socially   • Drug use: Not Currently     Types: Marijuana   • Sexual activity: Defer     Comment: .       Review of Systems  Review of Systems   Constitutional: Negative for chills, fatigue, fever and unexpected weight change.   HENT: Negative for congestion, ear discharge, hearing loss, nosebleeds and sore throat.    Eyes: Negative for pain, discharge and redness.   Respiratory: Negative for cough, chest tightness, shortness of breath and wheezing.    Cardiovascular: Negative for chest pain and palpitations.   Gastrointestinal: Negative for abdominal distention, abdominal pain, blood in stool, constipation, diarrhea, nausea and vomiting.   Endocrine: Negative for cold intolerance, polydipsia, polyphagia and polyuria.   Genitourinary: Negative for dysuria, flank pain, frequency, hematuria and urgency.   Musculoskeletal: Negative for arthralgias, back pain, joint swelling and myalgias.   Skin: Negative for color change, pallor and rash.   Neurological: Negative for tremors, seizures, syncope, weakness and headaches.   Hematological: Negative for adenopathy. Does not bruise/bleed easily.   Psychiatric/Behavioral: Negative for behavioral problems, confusion, dysphoric mood, hallucinations and suicidal ideas. The patient is not nervous/anxious.         /90 (Patient Position: Lying)   Pulse 76   Temp 97.2 °F (36.2 °C) (Temporal)   Resp 20   Ht 177.8 cm (70\")   Wt 91.3 kg (201 lb 3.2 oz)   SpO2 97%   BMI 28.87 kg/m²     Objective    Physical Exam  Constitutional:       " Appearance: He is well-developed.   HENT:      Head: Normocephalic and atraumatic.   Eyes:      Conjunctiva/sclera: Conjunctivae normal.      Pupils: Pupils are equal, round, and reactive to light.   Neck:      Thyroid: No thyromegaly.   Cardiovascular:      Rate and Rhythm: Normal rate and regular rhythm.      Heart sounds: Normal heart sounds. No murmur heard.  Pulmonary:      Effort: Pulmonary effort is normal.      Breath sounds: Normal breath sounds. No wheezing.   Abdominal:      General: Bowel sounds are normal. There is no distension.      Palpations: Abdomen is soft. There is no mass.      Tenderness: There is no abdominal tenderness.      Hernia: No hernia is present.   Genitourinary:     Comments: No lesions noted  Musculoskeletal:         General: No tenderness. Normal range of motion.      Cervical back: Normal range of motion and neck supple.   Lymphadenopathy:      Cervical: No cervical adenopathy.   Skin:     General: Skin is warm and dry.      Findings: No rash.   Neurological:      Mental Status: He is alert and oriented to person, place, and time.      Cranial Nerves: No cranial nerve deficit.   Psychiatric:         Thought Content: Thought content normal.       Admission on 06/07/2021, Discharged on 06/07/2021   Component Date Value Ref Range Status   • Case Report 06/07/2021    Final                    Value:Surgical Pathology Report                         Case: [id number]                                  Authorizing Provider:  Smitha Amaya MD      Collected:           06/07/2021 03:01 PM          Ordering Location:     [facility]             Received:            06/08/2021 06:46 AM                                 [place] [facility]                                                     Pathologist:           Monico Dennis MD                                                       Specimens:   1) - Small Intestine, Duodenum, duodenum bx                                                          2) - Gastric, Antrum, antrum bx                                                                     3) - Esophagus, eg junction bx                                                                      4) - Small Intestine, Ileum, ti bx                                                                  5) - Large Intestine, colonic mucosa bx                                                                                       6) - Large Intestine, Right / Ascending Colon, ascending colon polyps X 2                 • Final Diagnosis 06/07/2021    Final                    Value:This result contains rich text formatting which cannot be displayed here.     Assessment & Plan      1. Nausea    2. Epigastric pain    .   1.  Epigastric pain, resolved.  Continue Protonix and Pepcid.  Discontinue Carafate 1 g p.o. 4 times daily.   2.  Abdominal pain with diarrhea, resolved.  Continue Bentyl.  3.  Colon polyps, repeat colonoscopy in 3 years  4.   Diverticulosis, add high-fiber diet.  5.  High BMI, recommend exercise and diet control.  6.  Tobacco usage, recommend cessation.  7.  Accelerated hypertension, recommend PCP evaluation as soon as possible.    Orders placed during this encounter include:  Orders Placed This Encounter   Procedures   • Implement Anesthesia Orders Day of Procedure     Standing Status:   Standing     Number of Occurrences:   1   • Obtain Informed Consent     Standing Status:   Standing     Number of Occurrences:   1     Order Specific Question:   Informed Consent Given For     Answer:   egd   • POC Glucose Once     Prior to Procedure on ALL Diabetic Patients     Standing Status:   Standing     Number of Occurrences:   1   • Insert Peripheral IV     Standing Status:   Standing     Number of Occurrences:   1       ESOPHAGOGASTRODUODENOSCOPY (N/A)    Review and/or summary of lab tests, radiology, procedures, medications. Review and summary of old records and obtaining of history. The risks and  benefits of my recommendations, as well as other treatment options were discussed with the patient today. Questions were answered.    New Medications Ordered This Visit   Medications   • sodium chloride 0.9 % infusion 40 mL   • dextrose 5 % and sodium chloride 0.45 % infusion       Follow-up: No follow-ups on file.               Results for orders placed or performed in visit on 12/08/22   TSH Rfx On Abnormal To Free T4    Specimen: Blood   Result Value Ref Range    TSH 0.728 0.270 - 4.200 uIU/mL   CBC Auto Differential    Specimen: Blood   Result Value Ref Range    WBC 6.25 3.40 - 10.80 10*3/mm3    RBC 4.79 4.14 - 5.80 10*6/mm3    Hemoglobin 14.1 13.0 - 17.7 g/dL    Hematocrit 41.6 37.5 - 51.0 %    MCV 86.8 79.0 - 97.0 fL    MCH 29.4 26.6 - 33.0 pg    MCHC 33.9 31.5 - 35.7 g/dL    RDW 13.5 12.3 - 15.4 %    RDW-SD 42.3 37.0 - 54.0 fl    MPV 12.1 (H) 6.0 - 12.0 fL    Platelets 186 140 - 450 10*3/mm3    Neutrophil % 41.5 (L) 42.7 - 76.0 %    Lymphocyte % 43.5 19.6 - 45.3 %    Monocyte % 11.5 5.0 - 12.0 %    Eosinophil % 3.0 0.3 - 6.2 %    Basophil % 0.2 0.0 - 1.5 %    Immature Grans % 0.3 0.0 - 0.5 %    Neutrophils, Absolute 2.59 1.70 - 7.00 10*3/mm3    Lymphocytes, Absolute 2.72 0.70 - 3.10 10*3/mm3    Monocytes, Absolute 0.72 0.10 - 0.90 10*3/mm3    Eosinophils, Absolute 0.19 0.00 - 0.40 10*3/mm3    Basophils, Absolute 0.01 0.00 - 0.20 10*3/mm3    Immature Grans, Absolute 0.02 0.00 - 0.05 10*3/mm3    nRBC 0.0 0.0 - 0.2 /100 WBC   Hepatitis C antibody    Specimen: Blood   Result Value Ref Range    Hepatitis C Ab Non-Reactive Non-Reactive   Hemoglobin A1c    Specimen: Blood   Result Value Ref Range    Hemoglobin A1C 6.10 (H) 4.80 - 5.60 %   Lipid panel    Specimen: Blood   Result Value Ref Range    Total Cholesterol 156 0 - 200 mg/dL    Triglycerides 88 0 - 150 mg/dL    HDL Cholesterol 49 40 - 60 mg/dL    LDL Cholesterol  90 0 - 100 mg/dL    VLDL Cholesterol 17 5 - 40 mg/dL    LDL/HDL Ratio 1.82    Comprehensive  metabolic panel    Specimen: Blood   Result Value Ref Range    Glucose 98 65 - 99 mg/dL    BUN 15 6 - 20 mg/dL    Creatinine 1.15 0.76 - 1.27 mg/dL    Sodium 138 136 - 145 mmol/L    Potassium 4.2 3.5 - 5.2 mmol/L    Chloride 103 98 - 107 mmol/L    CO2 29.0 22.0 - 29.0 mmol/L    Calcium 9.3 8.6 - 10.5 mg/dL    Total Protein 7.1 6.0 - 8.5 g/dL    Albumin 4.40 3.50 - 5.20 g/dL    ALT (SGPT) 20 1 - 41 U/L    AST (SGOT) 21 1 - 40 U/L    Alkaline Phosphatase 84 39 - 117 U/L    Total Bilirubin 0.4 0.0 - 1.2 mg/dL    Globulin 2.7 gm/dL    A/G Ratio 1.6 g/dL    BUN/Creatinine Ratio 13.0 7.0 - 25.0    Anion Gap 6.0 5.0 - 15.0 mmol/L    eGFR 80.5 >60.0 mL/min/1.73   Results for orders placed or performed during the hospital encounter of 06/07/21   Tissue Pathology Exam    Specimen: A: Small Intestine, Duodenum; Tissue    B: Gastric, Antrum; Tissue    C: Esophagus; Tissue    D: Small Intestine, Ileum; Tissue    E: Large Intestine; Tissue    F: Large Intestine, Right / Ascending Colon; Tissue   Result Value Ref Range    Case Report       Surgical Pathology Report                         Case: [id number]                                  Authorizing Provider:  Smitha Amaya MD      Collected:           06/07/2021 03:01 PM          Ordering Location:     [facility]             Received:            06/08/2021 06:46 AM                                 [place] [facility]                                                     Pathologist:           Monico Dennis MD                                                       Specimens:   1) - Small Intestine, Duodenum, duodenum bx                                                         2) - Gastric, Antrum, antrum bx                                                                     3) - Esophagus, eg junction bx                                                                      4) - Small Intestine, Ileum, ti bx                                                                   5) - Large Intestine, colonic mucosa bx                                                              6) - Large Intestine, Right / Ascending Colon, ascending colon polyps X 2                  Final Diagnosis       SEE SCANNED REPORT       Results for orders placed or performed in visit on 06/04/21   COVID-19,  [facility] IN-HOUSE, NP SWAB IN TRANSPORT MEDIA 8-10 HR TAT - Swab, Nasopharynx    Specimen: Nasopharynx; Swab   Result Value Ref Range    COVID19 Not Detected Not Detected - Ref. Range   Results for orders placed or performed in visit on 04/13/21   COVID-19,  [facility], NP SWAB IN TRANSPORT MEDIA 8-10 HR TAT - Swab, Nasopharynx    Specimen: Nasopharynx; Swab   Result Value Ref Range    COVID19 Not Detected Not Detected - Ref. Range   Results for orders placed or performed during the hospital encounter of 05/30/20   Urinalysis With Microscopic If Indicated (No Culture) - Urine, Clean Catch    Specimen: Urine, Clean Catch   Result Value Ref Range    Color, UA Yellow Yellow, Straw, Dark Yellow, Sherice    Appearance, UA Clear Clear    pH, UA 7.0 5.0 - 9.0    Specific Gravity, UA 1.020 1.003 - 1.030    Glucose, UA Negative Negative    Ketones, UA Negative Negative    Bilirubin, UA Negative Negative    Blood, UA Negative Negative    Protein, UA Negative Negative    Leuk Esterase, UA Negative Negative    Nitrite, UA Negative Negative    Urobilinogen, UA 1.0 E.U./dL 0.2 - 1.0 E.U./dL   CBC Auto Differential    Specimen: Blood   Result Value Ref Range    WBC 6.35 3.40 - 10.80 10*3/mm3    RBC 5.01 4.14 - 5.80 10*6/mm3    Hemoglobin 15.5 13.0 - 17.7 g/dL    Hematocrit 45.1 37.5 - 51.0 %    MCV 90.0 79.0 - 97.0 fL    MCH 30.9 26.6 - 33.0 pg    MCHC 34.4 31.5 - 35.7 g/dL    RDW 13.3 12.3 - 15.4 %    RDW-SD 43.8 37.0 - 54.0 fl    MPV 11.7 6.0 - 12.0 fL    Platelets 170 140 - 450 10*3/mm3    Neutrophil % 42.7 42.7 - 76.0 %    Lymphocyte % 39.2 19.6 - 45.3 %    Monocyte % 14.2 (H) 5.0 - 12.0 %    Eosinophil % 3.3 0.3 -  6.2 %    Basophil % 0.3 0.0 - 1.5 %    Immature Grans % 0.3 0.0 - 0.5 %    Neutrophils, Absolute 2.71 1.70 - 7.00 10*3/mm3    Lymphocytes, Absolute 2.49 0.70 - 3.10 10*3/mm3    Monocytes, Absolute 0.90 0.10 - 0.90 10*3/mm3    Eosinophils, Absolute 0.21 0.00 - 0.40 10*3/mm3    Basophils, Absolute 0.02 0.00 - 0.20 10*3/mm3    Immature Grans, Absolute 0.02 0.00 - 0.05 10*3/mm3    nRBC 0.0 0.0 - 0.2 /100 WBC   Troponin    Specimen: Blood   Result Value Ref Range    Troponin T <0.010 0.000 - 0.030 ng/mL   Troponin    Specimen: Blood   Result Value Ref Range    Troponin T <0.010 0.000 - 0.030 ng/mL   Urine Drug Screen - Urine, Clean Catch    Specimen: Urine, Clean Catch   Result Value Ref Range    THC, Screen, Urine Negative Negative    Phencyclidine (PCP), Urine Negative Negative    Cocaine Screen, Urine Negative Negative    Methamphetamine, Ur Negative Negative    Opiate Screen Negative Negative    Amphetamine Screen, Urine Negative Negative    Benzodiazepine Screen, Urine Negative Negative    Tricyclic Antidepressants Screen Negative Negative    Methadone Screen, Urine Negative Negative    Barbiturates Screen, Urine Negative Negative    Oxycodone Screen, Urine Negative Negative    Propoxyphene Screen Negative Negative    Buprenorphine, Screen, Urine Negative Negative     *Note: Due to a large number of results and/or encounters for the requested time period, some results have not been displayed. A complete set of results can be found in Results Review.         This document has been electronically signed by Smitha Amaya MD on February 14, 2023 13:28 CST

## 2023-02-14 NOTE — ANESTHESIA POSTPROCEDURE EVALUATION
Patient: Nitin Jackson    Procedure Summary     Date: 02/14/23 Room / Location: [facility] ENDOSCOPY 2 / [facility] ENDOSCOPY    Anesthesia Start: 1402 Anesthesia Stop: 1410    Procedure: ESOPHAGOGASTRODUODENOSCOPY Diagnosis:       Epigastric pain      Nausea      (Epigastric pain [R10.13])      (Nausea [R11.0])    Surgeons: Smitha Amaya MD Provider: Oksana Rice CRNA    Anesthesia Type: general ASA Status: 3          Anesthesia Type: general    Vitals  No vitals data found for the desired time range.          Post Anesthesia Care and Evaluation    Patient location during evaluation: bedside  Patient participation: complete - patient participated  Level of consciousness: awake  Pain score: 0  Pain management: adequate    Airway patency: patent  Anesthetic complications: No anesthetic complications  PONV Status: none  Cardiovascular status: acceptable and tachycardic  Respiratory status: room air and acceptable (pt spasmed; ambu bag used to break. pt returned to 100% sat. awake and doing well)  Hydration status: acceptable

## 2023-02-14 NOTE — ANESTHESIA PREPROCEDURE EVALUATION
Anesthesia Evaluation     Patient summary reviewed and Nursing notes reviewed   no history of anesthetic complications:  NPO Solid Status: > 8 hours  NPO Liquid Status: > 2 hours           Airway   Mallampati: III  TM distance: >3 FB  Neck ROM: full  Possible difficult intubation  Dental - normal exam     Pulmonary - normal exam   (+) a smoker Former, COPD mild, asthma,shortness of breath, sleep apnea on CPAP,     ROS comment: Pulmonary sarcoidosis  Cardiovascular - normal exam    (+) hypertension well controlled less than 2 medications, KYLE,   (-) CAD, dysrhythmias      Neuro/Psych  (+) dizziness/light headedness,    (-) seizures, CVA  GI/Hepatic/Renal/Endo    (+)  GERD poorly controlled,    (-) diabetes    Musculoskeletal     (+) chronic pain,   Abdominal  - normal exam   Substance History   Drug use: THC.     OB/GYN          Other   arthritis,                      Anesthesia Plan    ASA 3     general   total IV anesthesia  intravenous induction     Anesthetic plan, risks, benefits, and alternatives have been provided, discussed and informed consent has been obtained with: patient.    Plan discussed with CRNA.

## 2023-02-16 LAB — REF LAB TEST METHOD: NORMAL

## 2023-02-19 NOTE — PROGRESS NOTES
"Chief Complaint   Patient presents with   • Heartburn   • Abdominal Pain       Subjective    Nitin Jackson is a 42 y.o. male.    History of Present Illness  Patient presented to GI clinic for follow-up visit today.  Has intermittent symptoms of epigastric pain and diarrhea.  Denied nausea, vomiting, constipation, rectal bleeding or weight loss.  Taking Protonix daily.  Denied NSAID usage.       The following portions of the patient's history were reviewed and updated as appropriate:   Past Medical History:   Diagnosis Date   • Allergic rhinitis    • Asthma    • Chronic dermatitis    • Chronic right shoulder pain    • Cigarette nicotine dependence, uncomplicated 08/28/2019   • Cigarette smoker 08/24/2016   • Cutaneous sarcoidosis 08/24/2016   • DJD (degenerative joint disease)     shoulder region, right side   • Dyspnea on exertion    • Elevated blood pressure    • Encounter for routine adult health examination    • Fatigue    • GERD (gastroesophageal reflux disease)    • Hypertension     \"NOT TAKING MEDS THAT WERE PRESCRIBED\"   • Obstructive sleep apnea    • Obstructive sleep apnea syndrome 08/24/2016   • Pain in left knee    • Vertigo      Past Surgical History:   Procedure Laterality Date   • BREAST LUMPECTOMY WITH AXILLARY NODE DISSECTION Left 08/22/2017    Procedure: LEFT AXILLARY LYMPH NODE BIOPSYl;  Surgeon: Bryan Arnold MD;  Location: [facility] OR;  Service:    • COLONOSCOPY N/A 06/07/2021    Procedure: COLONOSCOPY;  Surgeon: Smitha Amaya MD;  Location: [facility] ENDOSCOPY;  Service: Gastroenterology;  Laterality: N/A;   • ENDOSCOPY N/A 06/07/2021    Procedure: ESOPHAGOGASTRODUODENOSCOPY;  Surgeon: Smitha Amaya MD;  Location: [facility] ENDOSCOPY;  Service: Gastroenterology;  Laterality: N/A;   • INJECTION OF MEDICATION  08/10/2016    Kenalog   • SHOULDER ARTHROSCOPY Left 01/13/2015    Arthroscopy of shoulder, subacromial decompression, [place], and injection of the shoulder with 80 ml Kenalog   • " UPPER GASTROINTESTINAL ENDOSCOPY  06/07/2021   • WISDOM TOOTH EXTRACTION      2 removed     Family History   Problem Relation Age of Onset   • Diabetes Other    • Heart disease Other    • Hypertension Other    • No Known Problems Mother    • No Known Problems Father    • No Known Problems Sister    • Heart block Maternal Grandmother    • Lung cancer Paternal Grandmother    • Lung cancer Paternal Grandfather    • No Known Problems Half-Brother    • No Known Problems Half-Brother    • No Known Problems Sister    • No Known Problems Daughter    • No Known Problems Daughter    • No Known Problems Son    • No Known Problems Son        Prior to Admission medications    Medication Sig Start Date End Date Taking? Authorizing Provider   albuterol sulfate  (90 Base) MCG/ACT inhaler Inhale 2 puffs Every 4 (Four) Hours As Needed for Wheezing or Shortness of Air. 12/8/22  Yes Zander Serrano MD   chlorthalidone (HYGROTON) 25 MG tablet Take 0.5 tablets by mouth Daily. 12/8/22  Yes Zander Serrano MD   famotidine (PEPCID) 20 MG tablet Take 2 tablets by mouth 2 (Two) Times a Day As Needed for Heartburn. 12/8/22  Yes Zander Serrano MD   fluticasone (Flonase) 50 MCG/ACT nasal spray 2 sprays into the nostril(s) as directed by provider Daily for 30 days. 12/8/22 2/6/23 Yes Zander Serrano MD   montelukast (SINGULAIR) 10 MG tablet Take 1 tablet by mouth Every Night. 12/8/22  Yes Zander Serrano MD   pantoprazole (PROTONIX) 40 MG EC tablet Take 1 tablet by mouth Daily. 12/8/22  Yes Zander Serrano MD   sucralfate (Carafate) 1 g tablet Take 1 tablet by mouth 4 (Four) Times a Day. 1/23/23  Yes Alexis Stearns MD   Viloxazine HCl ER (Qelbree) 200 MG capsule sustained-release 24 hr Take 1 capsule by mouth Daily. 1/27/23  Yes Alexis Stearns MD     No Known Allergies  Social History     Socioeconomic History   • Marital status: Single   Tobacco Use   • Smoking status: Former     Packs/day: 0.50     Years: 20.00      "Pack years: 10.00     Types: Cigarettes     Quit date:      Years since quittin.1   • Smokeless tobacco: Never   Vaping Use   • Vaping Use: Former   • Substances: Nicotine   • Devices: Disposable   Substance and Sexual Activity   • Alcohol use: Not Currently     Comment: socially   • Drug use: Not Currently     Types: Marijuana   • Sexual activity: Defer     Comment: .       Review of Systems  Review of Systems   Constitutional: Negative for chills, fatigue, fever and unexpected weight change.   HENT: Negative for congestion, ear discharge, hearing loss, nosebleeds and sore throat.    Eyes: Negative for pain, discharge and redness.   Respiratory: Negative for cough, chest tightness, shortness of breath and wheezing.    Cardiovascular: Negative for chest pain and palpitations.   Gastrointestinal: Positive for abdominal pain and diarrhea. Negative for abdominal distention, blood in stool, constipation, nausea and vomiting.   Endocrine: Negative for cold intolerance, polydipsia, polyphagia and polyuria.   Genitourinary: Negative for dysuria, flank pain, frequency, hematuria and urgency.   Musculoskeletal: Negative for arthralgias, back pain, joint swelling and myalgias.   Skin: Negative for color change, pallor and rash.   Neurological: Negative for tremors, seizures, syncope, weakness and headaches.   Hematological: Negative for adenopathy. Does not bruise/bleed easily.   Psychiatric/Behavioral: Negative for behavioral problems, confusion, dysphoric mood, hallucinations and suicidal ideas. The patient is not nervous/anxious.         BP (!) 148/104 (BP Location: Right arm)   Pulse 77   Ht 177.8 cm (70\")   Wt 91 kg (200 lb 9.6 oz)   BMI 28.78 kg/m²     Objective    Physical Exam  Constitutional:       Appearance: He is well-developed.   HENT:      Head: Normocephalic and atraumatic.   Eyes:      Conjunctiva/sclera: Conjunctivae normal.      Pupils: Pupils are equal, round, and reactive to light. "   Neck:      Thyroid: No thyromegaly.   Cardiovascular:      Rate and Rhythm: Normal rate and regular rhythm.      Heart sounds: Normal heart sounds. No murmur heard.  Pulmonary:      Effort: Pulmonary effort is normal.      Breath sounds: Normal breath sounds. No wheezing.   Abdominal:      General: Bowel sounds are normal. There is no distension.      Palpations: Abdomen is soft. There is no mass.      Tenderness: There is no abdominal tenderness.      Hernia: No hernia is present.   Genitourinary:     Comments: No lesions noted  Musculoskeletal:         General: No tenderness. Normal range of motion.      Cervical back: Normal range of motion and neck supple.   Lymphadenopathy:      Cervical: No cervical adenopathy.   Skin:     General: Skin is warm and dry.      Findings: No rash.   Neurological:      Mental Status: He is alert and oriented to person, place, and time.      Cranial Nerves: No cranial nerve deficit.   Psychiatric:         Thought Content: Thought content normal.       Lab on 12/08/2022   Component Date Value Ref Range Status   • Glucose 12/08/2022 98  65 - 99 mg/dL Final   • BUN 12/08/2022 15  6 - 20 mg/dL Final   • Creatinine 12/08/2022 1.15  0.76 - 1.27 mg/dL Final   • Sodium 12/08/2022 138  136 - 145 mmol/L Final   • Potassium 12/08/2022 4.2  3.5 - 5.2 mmol/L Final   • Chloride 12/08/2022 103  98 - 107 mmol/L Final   • CO2 12/08/2022 29.0  22.0 - 29.0 mmol/L Final   • Calcium 12/08/2022 9.3  8.6 - 10.5 mg/dL Final   • Total Protein 12/08/2022 7.1  6.0 - 8.5 g/dL Final   • Albumin 12/08/2022 4.40  3.50 - 5.20 g/dL Final   • ALT (SGPT) 12/08/2022 20  1 - 41 U/L Final   • AST (SGOT) 12/08/2022 21  1 - 40 U/L Final   • Alkaline Phosphatase 12/08/2022 84  39 - 117 U/L Final   • Total Bilirubin 12/08/2022 0.4  0.0 - 1.2 mg/dL Final   • Globulin 12/08/2022 2.7  gm/dL Final   • A/G Ratio 12/08/2022 1.6  g/dL Final   • BUN/Creatinine Ratio 12/08/2022 13.0  7.0 - 25.0 Final   • Anion Gap 12/08/2022 6.0   5.0 - 15.0 mmol/L Final   • eGFR 12/08/2022 80.5  >60.0 mL/min/1.73 Final    National Kidney Foundation and American Society of Nephrology (ASN) Task Force recommended calculation based on the Chronic Kidney Disease Epidemiology Collaboration (CKD-EPI) equation refit without adjustment for race.   • Total Cholesterol 12/08/2022 156  0 - 200 mg/dL Final   • Triglycerides 12/08/2022 88  0 - 150 mg/dL Final   • HDL Cholesterol 12/08/2022 49  40 - 60 mg/dL Final   • LDL Cholesterol  12/08/2022 90  0 - 100 mg/dL Final   • VLDL Cholesterol 12/08/2022 17  5 - 40 mg/dL Final   • LDL/HDL Ratio 12/08/2022 1.82   Final   • TSH 12/08/2022 0.728  0.270 - 4.200 uIU/mL Final   • Hepatitis C Ab 12/08/2022 Non-Reactive  Non-Reactive Final   • Hemoglobin A1C 12/08/2022 6.10 (H)  4.80 - 5.60 % Final   • WBC 12/08/2022 6.25  3.40 - 10.80 10*3/mm3 Final   • RBC 12/08/2022 4.79  4.14 - 5.80 10*6/mm3 Final   • Hemoglobin 12/08/2022 14.1  13.0 - 17.7 g/dL Final   • Hematocrit 12/08/2022 41.6  37.5 - 51.0 % Final   • MCV 12/08/2022 86.8  79.0 - 97.0 fL Final   • MCH 12/08/2022 29.4  26.6 - 33.0 pg Final   • MCHC 12/08/2022 33.9  31.5 - 35.7 g/dL Final   • RDW 12/08/2022 13.5  12.3 - 15.4 % Final   • RDW-SD 12/08/2022 42.3  37.0 - 54.0 fl Final   • MPV 12/08/2022 12.1 (H)  6.0 - 12.0 fL Final   • Platelets 12/08/2022 186  140 - 450 10*3/mm3 Final   • Neutrophil % 12/08/2022 41.5 (L)  42.7 - 76.0 % Final   • Lymphocyte % 12/08/2022 43.5  19.6 - 45.3 % Final   • Monocyte % 12/08/2022 11.5  5.0 - 12.0 % Final   • Eosinophil % 12/08/2022 3.0  0.3 - 6.2 % Final   • Basophil % 12/08/2022 0.2  0.0 - 1.5 % Final   • Immature Grans % 12/08/2022 0.3  0.0 - 0.5 % Final   • Neutrophils, Absolute 12/08/2022 2.59  1.70 - 7.00 10*3/mm3 Final   • Lymphocytes, Absolute 12/08/2022 2.72  0.70 - 3.10 10*3/mm3 Final   • Monocytes, Absolute 12/08/2022 0.72  0.10 - 0.90 10*3/mm3 Final   • Eosinophils, Absolute 12/08/2022 0.19  0.00 - 0.40 10*3/mm3 Final   •  Basophils, Absolute 12/08/2022 0.01  0.00 - 0.20 10*3/mm3 Final   • Immature Grans, Absolute 12/08/2022 0.02  0.00 - 0.05 10*3/mm3 Final   • nRBC 12/08/2022 0.0  0.0 - 0.2 /100 WBC Final     Assessment & Plan      1. Epigastric pain    2. Nausea    1.  Epigastric pain, rule out peptic ulcer disease, gastritis and pancreaticobiliary pathology.  Continue Prilosec and add Carafate.  Proceed with EGD for further evaluation.  2.  Abdominal pain and diarrhea, resume Bentyl.  Colonoscopy if continues.  3.  Colon polyps, repeat colonoscopy in 3 years.  4.  High BMI, recommend exercise and diet control.  5.  Tobacco usage, recommend cessation.  6.  Significantly elevated blood pressure, patient is asymptomatic currently.  Recommend PCP evaluation as soon as possible.       Orders placed during this encounter include:  Orders Placed This Encounter   Procedures   • Obtain Informed Consent     Standing Status:   Future     Order Specific Question:   Informed Consent Given For     Answer:   ESOPHAGOGASTRODUODENOSCOPY       ESOPHAGOGASTRODUODENOSCOPY (N/A)    Review and/or summary of lab tests, radiology, procedures, medications. Review and summary of old records and obtaining of history. The risks and benefits of my recommendations, as well as other treatment options were discussed with the patient today. Questions were answered.    No orders of the defined types were placed in this encounter.      Follow-up: Return in about 1 month (around 3/6/2023).               Results for orders placed or performed during the hospital encounter of 02/14/23   TISSUE EXAM, [facility] ([facility])    Specimen: A: Small Intestine, Duodenum; Tissue    B: Gastric, Antrum; Tissue    C: Esophagus; Tissue   Result Value Ref Range    Reference Lab Report       [facility]  [address] [place] [address]  Phone: [phone] or [phone]  Fax: [phone]  Jesus Panda M.D., Medical Director    PATIENT NAME      "                      LABORATORY NO.  [id number]                   [id number]  [id number]                         AGE              SEX  SSN           CLIENT REF #  [facility]           42      1980  ESPERANZA    [id number]   [id number]    [place]                       REQUESTING M.D.     ATTENDING MMarilynD.     COPY TO.  [address] [place] [address]                 ROSANNA GONZALEZ CATHERINE  [address]  DATE COLLECTED      DATE RECEIVED      DATE REPORTED  2023          02/15/2023         2023    DIAGNOSIS:  A.   SMALL BOWEL, BIOPSY:  No significant histologic abnormality  B.   ANTRUM, BIOPSY:  Reactive gastropathy  C.   GE JUNCTION:  Reactive gastric mucosa  Negative for specialized Patel's  mucosa    JBS/pah    CLINICAL HISTORY:  Epigastric pain, nausea    SPECIMENS RECEIVED:  A.  SMALL BOWEL, BIOPSY  B.  ANTRUM, BIOPSY  C.  GE JUNCTION    MICROSCOPIC DESCRIPTION:  Tissue blocks are prepared and slides are examined microscopically on all  specimens. See diagnosis for details.    Professional interpretation rendered by Christiano Borrero M.D. at [facility], [address] [place] [address].    GROSS DESCRIPTION:  A.  Specimen is received in 1 formalin filled container labeled \"small bowel  biopsy\" and consists of 2 pieces of tan soft tissue measuring 0.5 x 0.3 x 0.2  cm in aggregate.  The specimen is submitted entirely in 1 block.  LAMAR  B.  The specimen is received in 1 formalin filled container labeled \"antrum  biopsy\" and consists of 2 pieces of tan soft tissue measuring 0.4 x 0.3 x 0.2  cm in aggregate.  The specimen is submitted entirely in 1 block.  C.  The specimen is received in 1 formalin filled container labeled \"EG  junction biopsy\" and consists of 2  pieces of tan soft tissue measuring 0.5 x  0.3 x 0.2 cm in aggregate.  The specimen is submitted entirely in 1 block.    REVIEWED, DIAGNOSED AND " ELECTRONICALLY  SIGNED BY:    Christiano Borrero M.D.  CPT CODES:  88305x3     Results for orders placed or performed in visit on 12/08/22   TSH Rfx On Abnormal To Free T4    Specimen: Blood   Result Value Ref Range    TSH 0.728 0.270 - 4.200 uIU/mL   CBC Auto Differential    Specimen: Blood   Result Value Ref Range    WBC 6.25 3.40 - 10.80 10*3/mm3    RBC 4.79 4.14 - 5.80 10*6/mm3    Hemoglobin 14.1 13.0 - 17.7 g/dL    Hematocrit 41.6 37.5 - 51.0 %    MCV 86.8 79.0 - 97.0 fL    MCH 29.4 26.6 - 33.0 pg    MCHC 33.9 31.5 - 35.7 g/dL    RDW 13.5 12.3 - 15.4 %    RDW-SD 42.3 37.0 - 54.0 fl    MPV 12.1 (H) 6.0 - 12.0 fL    Platelets 186 140 - 450 10*3/mm3    Neutrophil % 41.5 (L) 42.7 - 76.0 %    Lymphocyte % 43.5 19.6 - 45.3 %    Monocyte % 11.5 5.0 - 12.0 %    Eosinophil % 3.0 0.3 - 6.2 %    Basophil % 0.2 0.0 - 1.5 %    Immature Grans % 0.3 0.0 - 0.5 %    Neutrophils, Absolute 2.59 1.70 - 7.00 10*3/mm3    Lymphocytes, Absolute 2.72 0.70 - 3.10 10*3/mm3    Monocytes, Absolute 0.72 0.10 - 0.90 10*3/mm3    Eosinophils, Absolute 0.19 0.00 - 0.40 10*3/mm3    Basophils, Absolute 0.01 0.00 - 0.20 10*3/mm3    Immature Grans, Absolute 0.02 0.00 - 0.05 10*3/mm3    nRBC 0.0 0.0 - 0.2 /100 WBC   Hepatitis C antibody    Specimen: Blood   Result Value Ref Range    Hepatitis C Ab Non-Reactive Non-Reactive   Hemoglobin A1c    Specimen: Blood   Result Value Ref Range    Hemoglobin A1C 6.10 (H) 4.80 - 5.60 %   Lipid panel    Specimen: Blood   Result Value Ref Range    Total Cholesterol 156 0 - 200 mg/dL    Triglycerides 88 0 - 150 mg/dL    HDL Cholesterol 49 40 - 60 mg/dL    LDL Cholesterol  90 0 - 100 mg/dL    VLDL Cholesterol 17 5 - 40 mg/dL    LDL/HDL Ratio 1.82    Comprehensive metabolic panel    Specimen: Blood   Result Value Ref Range    Glucose 98 65 - 99 mg/dL    BUN 15 6 - 20 mg/dL    Creatinine 1.15 0.76 - 1.27 mg/dL    Sodium 138 136 - 145 mmol/L    Potassium 4.2 3.5 - 5.2 mmol/L    Chloride 103 98 - 107 mmol/L    CO2 29.0  22.0 - 29.0 mmol/L    Calcium 9.3 8.6 - 10.5 mg/dL    Total Protein 7.1 6.0 - 8.5 g/dL    Albumin 4.40 3.50 - 5.20 g/dL    ALT (SGPT) 20 1 - 41 U/L    AST (SGOT) 21 1 - 40 U/L    Alkaline Phosphatase 84 39 - 117 U/L    Total Bilirubin 0.4 0.0 - 1.2 mg/dL    Globulin 2.7 gm/dL    A/G Ratio 1.6 g/dL    BUN/Creatinine Ratio 13.0 7.0 - 25.0    Anion Gap 6.0 5.0 - 15.0 mmol/L    eGFR 80.5 >60.0 mL/min/1.73   Results for orders placed or performed during the hospital encounter of 06/07/21   Tissue Pathology Exam    Specimen: A: Small Intestine, Duodenum; Tissue    B: Gastric, Antrum; Tissue    C: Esophagus; Tissue    D: Small Intestine, Ileum; Tissue    E: Large Intestine; Tissue    F: Large Intestine, Right / Ascending Colon; Tissue   Result Value Ref Range    Case Report       Surgical Pathology Report                         Case: [id number]                                  Authorizing Provider:  Smitha Amaya MD      Collected:           06/07/2021 03:01 PM          Ordering Location:     [facility]             Received:            06/08/2021 06:46 AM                                 [facility]                                                     Pathologist:           Monico Dennis MD                                                       Specimens:   1) - Small Intestine, Duodenum, duodenum bx                                                         2) - Gastric, Antrum, antrum bx                                                                     3) - Esophagus, eg junction bx                                                                      4) - Small Intestine, Ileum, ti bx                                                                  5) - Large Intestine, colonic mucosa bx                                                              6) - Large Intestine, Right / Ascending Colon, ascending colon polyps X 2                  Final Diagnosis       SEE SCANNED REPORT       Results for  orders placed or performed in visit on 06/04/21   COVID-19,  [facility] IN-HOUSE, NP SWAB IN TRANSPORT MEDIA 8-10 HR TAT - Swab, Nasopharynx    Specimen: Nasopharynx; Swab   Result Value Ref Range    COVID19 Not Detected Not Detected - Ref. Range   Results for orders placed or performed in visit on 04/13/21   COVID-19,  [facility] IN-HOUSE, NP SWAB IN TRANSPORT MEDIA 8-10 HR TAT - Swab, Nasopharynx    Specimen: Nasopharynx; Swab   Result Value Ref Range    COVID19 Not Detected Not Detected - Ref. Range   Results for orders placed or performed during the hospital encounter of 05/30/20   Urinalysis With Microscopic If Indicated (No Culture) - Urine, Clean Catch    Specimen: Urine, Clean Catch   Result Value Ref Range    Color, UA Yellow Yellow, Straw, Dark Yellow, Sherice    Appearance, UA Clear Clear    pH, UA 7.0 5.0 - 9.0    Specific Gravity, UA 1.020 1.003 - 1.030    Glucose, UA Negative Negative    Ketones, UA Negative Negative    Bilirubin, UA Negative Negative    Blood, UA Negative Negative    Protein, UA Negative Negative    Leuk Esterase, UA Negative Negative    Nitrite, UA Negative Negative    Urobilinogen, UA 1.0 E.U./dL 0.2 - 1.0 E.U./dL   CBC Auto Differential    Specimen: Blood   Result Value Ref Range    WBC 6.35 3.40 - 10.80 10*3/mm3    RBC 5.01 4.14 - 5.80 10*6/mm3    Hemoglobin 15.5 13.0 - 17.7 g/dL    Hematocrit 45.1 37.5 - 51.0 %    MCV 90.0 79.0 - 97.0 fL    MCH 30.9 26.6 - 33.0 pg    MCHC 34.4 31.5 - 35.7 g/dL    RDW 13.3 12.3 - 15.4 %    RDW-SD 43.8 37.0 - 54.0 fl    MPV 11.7 6.0 - 12.0 fL    Platelets 170 140 - 450 10*3/mm3    Neutrophil % 42.7 42.7 - 76.0 %    Lymphocyte % 39.2 19.6 - 45.3 %    Monocyte % 14.2 (H) 5.0 - 12.0 %    Eosinophil % 3.3 0.3 - 6.2 %    Basophil % 0.3 0.0 - 1.5 %    Immature Grans % 0.3 0.0 - 0.5 %    Neutrophils, Absolute 2.71 1.70 - 7.00 10*3/mm3    Lymphocytes, Absolute 2.49 0.70 - 3.10 10*3/mm3    Monocytes, Absolute 0.90 0.10 - 0.90 10*3/mm3    Eosinophils, Absolute 0.21 0.00  - 0.40 10*3/mm3    Basophils, Absolute 0.02 0.00 - 0.20 10*3/mm3    Immature Grans, Absolute 0.02 0.00 - 0.05 10*3/mm3    nRBC 0.0 0.0 - 0.2 /100 WBC   Troponin    Specimen: Blood   Result Value Ref Range    Troponin T <0.010 0.000 - 0.030 ng/mL   Troponin    Specimen: Blood   Result Value Ref Range    Troponin T <0.010 0.000 - 0.030 ng/mL   Urine Drug Screen - Urine, Clean Catch    Specimen: Urine, Clean Catch   Result Value Ref Range    THC, Screen, Urine Negative Negative    Phencyclidine (PCP), Urine Negative Negative    Cocaine Screen, Urine Negative Negative    Methamphetamine, Ur Negative Negative    Opiate Screen Negative Negative    Amphetamine Screen, Urine Negative Negative    Benzodiazepine Screen, Urine Negative Negative    Tricyclic Antidepressants Screen Negative Negative    Methadone Screen, Urine Negative Negative    Barbiturates Screen, Urine Negative Negative    Oxycodone Screen, Urine Negative Negative    Propoxyphene Screen Negative Negative    Buprenorphine, Screen, Urine Negative Negative     *Note: Due to a large number of results and/or encounters for the requested time period, some results have not been displayed. A complete set of results can be found in Results Review.         This document has been electronically signed by Smitha Amaya MD on February 19, 2023 10:20 CST

## 2023-02-21 ENCOUNTER — OFFICE VISIT (OUTPATIENT)
Dept: GASTROENTEROLOGY | Facility: CLINIC | Age: 43
End: 2023-02-21
Payer: COMMERCIAL

## 2023-02-21 VITALS
HEIGHT: 70 IN | DIASTOLIC BLOOD PRESSURE: 90 MMHG | BODY MASS INDEX: 29.2 KG/M2 | WEIGHT: 204 LBS | HEART RATE: 92 BPM | SYSTOLIC BLOOD PRESSURE: 140 MMHG

## 2023-02-21 DIAGNOSIS — K21.9 GASTROESOPHAGEAL REFLUX DISEASE, UNSPECIFIED WHETHER ESOPHAGITIS PRESENT: ICD-10-CM

## 2023-02-21 PROCEDURE — 99214 OFFICE O/P EST MOD 30 MIN: CPT | Performed by: INTERNAL MEDICINE

## 2023-02-21 RX ORDER — SUCRALFATE 1 G/1
1 TABLET ORAL 4 TIMES DAILY
Qty: 120 TABLET | Refills: 2 | Status: SHIPPED | OUTPATIENT
Start: 2023-02-21

## 2023-02-23 NOTE — PROGRESS NOTES
"Chief Complaint   Patient presents with   • endo f/u      Epigastric pain        Subjective    Nitin Jackson is a 42 y.o. male.    History of Present Illness  Patient presented to GI clinic for follow-up visit today.  Feels better currently.  Has intermittent symptoms of postprandial bloating.  Denied abdominal pain, nausea or vomiting.  Bowel movements regular.  Weight is stable.  EGD was consistent with esophagitis and gastritis.  Path was consistent with reactive gastropathy.       The following portions of the patient's history were reviewed and updated as appropriate:   Past Medical History:   Diagnosis Date   • Allergic rhinitis    • Asthma    • Chronic dermatitis    • Chronic right shoulder pain    • Cigarette nicotine dependence, uncomplicated 08/28/2019   • Cigarette smoker 08/24/2016   • Cutaneous sarcoidosis 08/24/2016   • DJD (degenerative joint disease)     shoulder region, right side   • Dyspnea on exertion    • Elevated blood pressure    • Encounter for routine adult health examination    • Fatigue    • GERD (gastroesophageal reflux disease)    • Hypertension     \"NOT TAKING MEDS THAT WERE PRESCRIBED\"   • Obstructive sleep apnea    • Obstructive sleep apnea syndrome 08/24/2016   • Pain in left knee    • Vertigo      Past Surgical History:   Procedure Laterality Date   • BREAST LUMPECTOMY WITH AXILLARY NODE DISSECTION Left 08/22/2017    Procedure: LEFT AXILLARY LYMPH NODE BIOPSYl;  Surgeon: Bryan Arnold MD;  Location: [facility] OR;  Service:    • COLONOSCOPY N/A 06/07/2021    Procedure: COLONOSCOPY;  Surgeon: Smitha Amaya MD;  Location: [facility] ENDOSCOPY;  Service: Gastroenterology;  Laterality: N/A;   • ENDOSCOPY N/A 06/07/2021    Procedure: ESOPHAGOGASTRODUODENOSCOPY;  Surgeon: Smitha Amaya MD;  Location: [facility] ENDOSCOPY;  Service: Gastroenterology;  Laterality: N/A;   • INJECTION OF MEDICATION  08/10/2016    Kenalog   • SHOULDER ARTHROSCOPY Left 01/13/2015    Arthroscopy of shoulder, " subacromial decompression, Reed, and injection of the shoulder with 80 ml Kenalog   • UPPER GASTROINTESTINAL ENDOSCOPY  06/07/2021   • WISDOM TOOTH EXTRACTION      2 removed     Family History   Problem Relation Age of Onset   • Diabetes Other    • Heart disease Other    • Hypertension Other    • No Known Problems Mother    • No Known Problems Father    • No Known Problems Sister    • Heart block Maternal Grandmother    • Lung cancer Paternal Grandmother    • Lung cancer Paternal Grandfather    • No Known Problems Half-Brother    • No Known Problems Half-Brother    • No Known Problems Sister    • No Known Problems Daughter    • No Known Problems Daughter    • No Known Problems Son    • No Known Problems Son        Prior to Admission medications    Medication Sig Start Date End Date Taking? Authorizing Provider   albuterol sulfate  (90 Base) MCG/ACT inhaler Inhale 2 puffs Every 4 (Four) Hours As Needed for Wheezing or Shortness of Air. 12/8/22  Yes Zander Serrano MD   chlorthalidone (HYGROTON) 25 MG tablet Take 0.5 tablets by mouth Daily. 12/8/22  Yes Zander Serrano MD   famotidine (PEPCID) 20 MG tablet Take 2 tablets by mouth 2 (Two) Times a Day As Needed for Heartburn. 12/8/22  Yes Zander Serrano MD   montelukast (SINGULAIR) 10 MG tablet Take 1 tablet by mouth Every Night. 12/8/22  Yes Zander Serrano MD   pantoprazole (PROTONIX) 40 MG EC tablet Take 1 tablet by mouth Daily. 12/8/22  Yes Zander Serrano MD   sucralfate (Carafate) 1 g tablet Take 1 tablet by mouth 4 (Four) Times a Day. 2/21/23  Yes Scott Lisa MD   Viloxazine HCl ER (Qelbree) 200 MG capsule sustained-release 24 hr Take 1 capsule by mouth Daily. 1/27/23  Yes Alexis Stearns MD   fluticasone (Flonase) 50 MCG/ACT nasal spray 2 sprays into the nostril(s) as directed by provider Daily for 30 days. 12/8/22 2/6/23  Zander Serrano MD     No Known Allergies  Social History     Socioeconomic History   • Marital status:  "Single   Tobacco Use   • Smoking status: Former     Packs/day: 0.50     Years: 20.00     Pack years: 10.00     Types: Cigarettes     Quit date:      Years since quittin.1   • Smokeless tobacco: Never   Vaping Use   • Vaping Use: Former   • Substances: Nicotine   • Devices: Disposable   Substance and Sexual Activity   • Alcohol use: Not Currently     Comment: socially   • Drug use: Not Currently     Types: Marijuana   • Sexual activity: Defer     Comment: .       Review of Systems  Review of Systems   Constitutional: Negative for chills, fatigue, fever and unexpected weight change.   HENT: Negative for congestion, ear discharge, hearing loss, nosebleeds and sore throat.    Eyes: Negative for pain, discharge and redness.   Respiratory: Negative for cough, chest tightness, shortness of breath and wheezing.    Cardiovascular: Negative for chest pain and palpitations.   Gastrointestinal: Negative for abdominal distention, abdominal pain, blood in stool, constipation, diarrhea, nausea and vomiting.   Endocrine: Negative for cold intolerance, polydipsia, polyphagia and polyuria.   Genitourinary: Negative for dysuria, flank pain, frequency, hematuria and urgency.   Musculoskeletal: Negative for arthralgias, back pain, joint swelling and myalgias.   Skin: Negative for color change, pallor and rash.   Neurological: Negative for tremors, seizures, syncope, weakness and headaches.   Hematological: Negative for adenopathy. Does not bruise/bleed easily.   Psychiatric/Behavioral: Negative for behavioral problems, confusion, dysphoric mood, hallucinations and suicidal ideas. The patient is not nervous/anxious.    Has postprandial bloating.     /90 (BP Location: Left arm)   Pulse 92   Ht 177.8 cm (70\")   Wt 92.5 kg (204 lb)   BMI 29.27 kg/m²     Objective    Physical Exam  Constitutional:       Appearance: He is well-developed.   HENT:      Head: Normocephalic and atraumatic.   Eyes:      Conjunctiva/sclera: " Conjunctivae normal.      Pupils: Pupils are equal, round, and reactive to light.   Neck:      Thyroid: No thyromegaly.   Cardiovascular:      Rate and Rhythm: Normal rate and regular rhythm.      Heart sounds: Normal heart sounds. No murmur heard.  Pulmonary:      Effort: Pulmonary effort is normal.      Breath sounds: Normal breath sounds. No wheezing.   Abdominal:      General: Bowel sounds are normal. There is no distension.      Palpations: Abdomen is soft. There is no mass.      Tenderness: There is no abdominal tenderness.      Hernia: No hernia is present.   Genitourinary:     Comments: No lesions noted  Musculoskeletal:         General: No tenderness. Normal range of motion.      Cervical back: Normal range of motion and neck supple.   Lymphadenopathy:      Cervical: No cervical adenopathy.   Skin:     General: Skin is warm and dry.      Findings: No rash.   Neurological:      Mental Status: He is alert and oriented to person, place, and time.      Cranial Nerves: No cranial nerve deficit.   Psychiatric:         Thought Content: Thought content normal.       Admission on 2023, Discharged on 2023   Component Date Value Ref Range Status   • Reference Lab Report 2023    Final                    Value:[facility]  [address] [place] [address]  Phone: [phone] or [phone]  Fax: [phone]  Jesus Panda M.D., Medical Director    PATIENT NAME                           LABORATORY NO.  [address].                   [id number]  [id number]                         AGE              SEX  N           CLIENT REF #  [facility]           42      1980      [id number]   [id number]    [place]                       REQUESTING M.D.     ATTENDING M.D.     COPY TO[address]  DATE COLLECTED      DATE RECEIVED    "   DATE REPORTED  02/14/2023          02/15/2023         02/16/2023    DIAGNOSIS:  A.   SMALL BOWEL, BIOPSY:  No significant histologic abnormality  B.   ANTRUM, BIOPSY:  Reactive gastropathy  C.   GE JUNCTION:  Reactive gastric mucosa  Negative for specialized Patel's                           mucosa    JBS/pah    CLINICAL HISTORY:  Epigastric pain, nausea    SPECIMENS RECEIVED:  A.  SMALL BOWEL, BIOPSY  B.  ANTRUM, BIOPSY  C.  GE JUNCTION    MICROSCOPIC DESCRIPTION:  Tissue blocks are prepared and slides are examined microscopically on all  specimens. See diagnosis for details.    Professional interpretation rendered by Christiano Borrero M.D. at [facility], [address] [place] [address].    GROSS DESCRIPTION:  A.  Specimen is received in 1 formalin filled container labeled \"small bowel  biopsy\" and consists of 2 pieces of tan soft tissue measuring 0.5 x 0.3 x 0.2  cm in aggregate.  The specimen is submitted entirely in 1 block.  LAMAR  B.  The specimen is received in 1 formalin filled container labeled \"antrum  biopsy\" and consists of 2 pieces of tan soft tissue measuring 0.4 x 0.3 x 0.2  cm in aggregate.  The specimen is submitted entirely in 1 block.  C.  The specimen is received in 1 formalin filled container labeled \"EG  junction biopsy\" and consists of 2                           pieces of tan soft tissue measuring 0.5 x  0.3 x 0.2 cm in aggregate.  The specimen is submitted entirely in 1 block.    REVIEWED, DIAGNOSED AND ELECTRONICALLY  SIGNED BY:    Christiano Borrero M.D.  CPT CODES:  88305x3       Assessment & Plan      1. Gastroesophageal reflux disease, unspecified whether esophagitis present    1.  Epigastric pain with nausea, likely due to reactive gastropathy.  Could also be due to pancreaticobiliary pathology.  Continue PPI.  Add Carafate 1 g p.o. 4 times daily.  Right upper quadrant sonogram if symptoms continue.  2.  Abdominal pain with diarrhea, well controlled.  Continue " high-fiber diet and Bentyl.  Colonoscopy if continues.  3.  Colon polyps, repeat colonoscopy in 3 years.  4.  High BMI, recommend exercise and diet control.  5.  Tobacco usage, recommend cessation.  6.  Elevated blood pressure, recommend PCP evaluation.       Orders placed during this encounter include:  No orders of the defined types were placed in this encounter.      * Surgery not found *    Review and/or summary of lab tests, radiology, procedures, medications. Review and summary of old records and obtaining of history. The risks and benefits of my recommendations, as well as other treatment options were discussed with the patient today. Questions were answered.    New Medications Ordered This Visit   Medications   • sucralfate (Carafate) 1 g tablet     Sig: Take 1 tablet by mouth 4 (Four) Times a Day.     Dispense:  120 tablet     Refill:  2       Follow-up: Return in about 3 months (around 2023).               Results for orders placed or performed during the hospital encounter of 23   TISSUE EXAM, [facility] ([facility])    Specimen: A: Small Intestine, Duodenum; Tissue    B: Gastric, Antrum; Tissue    C: Esophagus; Tissue   Result Value Ref Range    Reference Lab Report       [facility]  [address] [place] [address]  Phone: [phone] or [phone]  Fax: [phone]  Jesus Panda M.D., Medical Director    PATIENT NAME                           LABORATORY NO.  SURAJ CARDOSO.                   [id number]  [id number]                         AGE              SEX  SSN           CLIENT REF #  [facility]           42      1980  [place]PER[place]    [id number]   [id number]    [place]                       REQUESTING M.D.     ATTENDING M.D.     COPY TO.  [address] [place] [address]                 [address] [place]  [place] [address]             SUMALATHA  DATE COLLECTED      DATE RECEIVED      DATE REPORTED  2023    "       02/15/2023         02/16/2023    DIAGNOSIS:  A.   SMALL BOWEL, BIOPSY:  No significant histologic abnormality  B.   ANTRUM, BIOPSY:  Reactive gastropathy  C.   GE JUNCTION:  Reactive gastric mucosa  Negative for specialized Patel's  mucosa    JBS/pah    CLINICAL HISTORY:  Epigastric pain, nausea    SPECIMENS RECEIVED:  A.  SMALL BOWEL, BIOPSY  B.  ANTRUM, BIOPSY  C.  GE JUNCTION    MICROSCOPIC DESCRIPTION:  Tissue blocks are prepared and slides are examined microscopically on all  specimens. See diagnosis for details.    Professional interpretation rendered by Christiano Borrero M.D. at [facility], [address] [place] [address].    GROSS DESCRIPTION:  A.  Specimen is received in 1 formalin filled container labeled \"small bowel  biopsy\" and consists of 2 pieces of tan soft tissue measuring 0.5 x 0.3 x 0.2  cm in aggregate.  The specimen is submitted entirely in 1 block.  LAMAR  B.  The specimen is received in 1 formalin filled container labeled \"antrum  biopsy\" and consists of 2 pieces of tan soft tissue measuring 0.4 x 0.3 x 0.2  cm in aggregate.  The specimen is submitted entirely in 1 block.  C.  The specimen is received in 1 formalin filled container labeled \"EG  junction biopsy\" and consists of 2  pieces of tan soft tissue measuring 0.5 x  0.3 x 0.2 cm in aggregate.  The specimen is submitted entirely in 1 block.    REVIEWED, DIAGNOSED AND ELECTRONICALLY  SIGNED BY:    Christiano Borrero M.D.  CPT CODES:  88305x3     Results for orders placed or performed in visit on 12/08/22   TSH Rfx On Abnormal To Free T4    Specimen: Blood   Result Value Ref Range    TSH 0.728 0.270 - 4.200 uIU/mL   CBC Auto Differential    Specimen: Blood   Result Value Ref Range    WBC 6.25 3.40 - 10.80 10*3/mm3    RBC 4.79 4.14 - 5.80 10*6/mm3    Hemoglobin 14.1 13.0 - 17.7 g/dL    Hematocrit 41.6 37.5 - 51.0 %    MCV 86.8 79.0 - 97.0 fL    MCH 29.4 26.6 - 33.0 pg    MCHC 33.9 31.5 - 35.7 g/dL    RDW 13.5 12.3 - 15.4 " %    RDW-SD 42.3 37.0 - 54.0 fl    MPV 12.1 (H) 6.0 - 12.0 fL    Platelets 186 140 - 450 10*3/mm3    Neutrophil % 41.5 (L) 42.7 - 76.0 %    Lymphocyte % 43.5 19.6 - 45.3 %    Monocyte % 11.5 5.0 - 12.0 %    Eosinophil % 3.0 0.3 - 6.2 %    Basophil % 0.2 0.0 - 1.5 %    Immature Grans % 0.3 0.0 - 0.5 %    Neutrophils, Absolute 2.59 1.70 - 7.00 10*3/mm3    Lymphocytes, Absolute 2.72 0.70 - 3.10 10*3/mm3    Monocytes, Absolute 0.72 0.10 - 0.90 10*3/mm3    Eosinophils, Absolute 0.19 0.00 - 0.40 10*3/mm3    Basophils, Absolute 0.01 0.00 - 0.20 10*3/mm3    Immature Grans, Absolute 0.02 0.00 - 0.05 10*3/mm3    nRBC 0.0 0.0 - 0.2 /100 WBC   Hepatitis C antibody    Specimen: Blood   Result Value Ref Range    Hepatitis C Ab Non-Reactive Non-Reactive   Hemoglobin A1c    Specimen: Blood   Result Value Ref Range    Hemoglobin A1C 6.10 (H) 4.80 - 5.60 %   Lipid panel    Specimen: Blood   Result Value Ref Range    Total Cholesterol 156 0 - 200 mg/dL    Triglycerides 88 0 - 150 mg/dL    HDL Cholesterol 49 40 - 60 mg/dL    LDL Cholesterol  90 0 - 100 mg/dL    VLDL Cholesterol 17 5 - 40 mg/dL    LDL/HDL Ratio 1.82    Comprehensive metabolic panel    Specimen: Blood   Result Value Ref Range    Glucose 98 65 - 99 mg/dL    BUN 15 6 - 20 mg/dL    Creatinine 1.15 0.76 - 1.27 mg/dL    Sodium 138 136 - 145 mmol/L    Potassium 4.2 3.5 - 5.2 mmol/L    Chloride 103 98 - 107 mmol/L    CO2 29.0 22.0 - 29.0 mmol/L    Calcium 9.3 8.6 - 10.5 mg/dL    Total Protein 7.1 6.0 - 8.5 g/dL    Albumin 4.40 3.50 - 5.20 g/dL    ALT (SGPT) 20 1 - 41 U/L    AST (SGOT) 21 1 - 40 U/L    Alkaline Phosphatase 84 39 - 117 U/L    Total Bilirubin 0.4 0.0 - 1.2 mg/dL    Globulin 2.7 gm/dL    A/G Ratio 1.6 g/dL    BUN/Creatinine Ratio 13.0 7.0 - 25.0    Anion Gap 6.0 5.0 - 15.0 mmol/L    eGFR 80.5 >60.0 mL/min/1.73   Results for orders placed or performed during the hospital encounter of 06/07/21   Tissue Pathology Exam    Specimen: A: Small Intestine, Duodenum; Tissue     B: Gastric, Antrum; Tissue    C: Esophagus; Tissue    D: Small Intestine, Ileum; Tissue    E: Large Intestine; Tissue    F: Large Intestine, Right / Ascending Colon; Tissue   Result Value Ref Range    Case Report       Surgical Pathology Report                         Case: [id number]                                  Authorizing Provider:  Smitha Amaya MD      Collected:           06/07/2021 03:01 PM          Ordering Location:     [facility]             Received:            06/08/2021 06:46 AM                                 [facility]                                                     Pathologist:           Monico Dennis MD                                                       Specimens:   1) - Small Intestine, Duodenum, duodenum bx                                                         2) - Gastric, Antrum, antrum bx                                                                     3) - Esophagus, eg junction bx                                                                      4) - Small Intestine, Ileum, ti bx                                                                  5) - Large Intestine, colonic mucosa bx                                                              6) - Large Intestine, Right / Ascending Colon, ascending colon polyps X 2                  Final Diagnosis       SEE SCANNED REPORT       Results for orders placed or performed in visit on 06/04/21   COVID-19, [facility] IN-HOUSE, NP SWAB IN TRANSPORT MEDIA 8-10 HR TAT - Swab, Nasopharynx    Specimen: Nasopharynx; Swab   Result Value Ref Range    COVID19 Not Detected Not Detected - Ref. Range   Results for orders placed or performed in visit on 04/13/21   COVID-19, [facility] IN-HOUSE, NP SWAB IN TRANSPORT MEDIA 8-10 HR TAT - Swab, Nasopharynx    Specimen: Nasopharynx; Swab   Result Value Ref Range    COVID19 Not Detected Not Detected - Ref. Range   Results for orders placed or performed during the hospital  encounter of 05/30/20   Urinalysis With Microscopic If Indicated (No Culture) - Urine, Clean Catch    Specimen: Urine, Clean Catch   Result Value Ref Range    Color, UA Yellow Yellow, Straw, Dark Yellow, Sherice    Appearance, UA Clear Clear    pH, UA 7.0 5.0 - 9.0    Specific Gravity, UA 1.020 1.003 - 1.030    Glucose, UA Negative Negative    Ketones, UA Negative Negative    Bilirubin, UA Negative Negative    Blood, UA Negative Negative    Protein, UA Negative Negative    Leuk Esterase, UA Negative Negative    Nitrite, UA Negative Negative    Urobilinogen, UA 1.0 E.U./dL 0.2 - 1.0 E.U./dL   CBC Auto Differential    Specimen: Blood   Result Value Ref Range    WBC 6.35 3.40 - 10.80 10*3/mm3    RBC 5.01 4.14 - 5.80 10*6/mm3    Hemoglobin 15.5 13.0 - 17.7 g/dL    Hematocrit 45.1 37.5 - 51.0 %    MCV 90.0 79.0 - 97.0 fL    MCH 30.9 26.6 - 33.0 pg    MCHC 34.4 31.5 - 35.7 g/dL    RDW 13.3 12.3 - 15.4 %    RDW-SD 43.8 37.0 - 54.0 fl    MPV 11.7 6.0 - 12.0 fL    Platelets 170 140 - 450 10*3/mm3    Neutrophil % 42.7 42.7 - 76.0 %    Lymphocyte % 39.2 19.6 - 45.3 %    Monocyte % 14.2 (H) 5.0 - 12.0 %    Eosinophil % 3.3 0.3 - 6.2 %    Basophil % 0.3 0.0 - 1.5 %    Immature Grans % 0.3 0.0 - 0.5 %    Neutrophils, Absolute 2.71 1.70 - 7.00 10*3/mm3    Lymphocytes, Absolute 2.49 0.70 - 3.10 10*3/mm3    Monocytes, Absolute 0.90 0.10 - 0.90 10*3/mm3    Eosinophils, Absolute 0.21 0.00 - 0.40 10*3/mm3    Basophils, Absolute 0.02 0.00 - 0.20 10*3/mm3    Immature Grans, Absolute 0.02 0.00 - 0.05 10*3/mm3    nRBC 0.0 0.0 - 0.2 /100 WBC   Troponin    Specimen: Blood   Result Value Ref Range    Troponin T <0.010 0.000 - 0.030 ng/mL   Troponin    Specimen: Blood   Result Value Ref Range    Troponin T <0.010 0.000 - 0.030 ng/mL   Urine Drug Screen - Urine, Clean Catch    Specimen: Urine, Clean Catch   Result Value Ref Range    THC, Screen, Urine Negative Negative    Phencyclidine (PCP), Urine Negative Negative    Cocaine Screen, Urine  Negative Negative    Methamphetamine, Ur Negative Negative    Opiate Screen Negative Negative    Amphetamine Screen, Urine Negative Negative    Benzodiazepine Screen, Urine Negative Negative    Tricyclic Antidepressants Screen Negative Negative    Methadone Screen, Urine Negative Negative    Barbiturates Screen, Urine Negative Negative    Oxycodone Screen, Urine Negative Negative    Propoxyphene Screen Negative Negative    Buprenorphine, Screen, Urine Negative Negative     *Note: Due to a large number of results and/or encounters for the requested time period, some results have not been displayed. A complete set of results can be found in Results Review.         This document has been electronically signed by Smitha Amaya MD on February 22, 2023 19:16 CST

## 2023-04-06 RX ORDER — CHLORTHALIDONE 25 MG/1
12.5 TABLET ORAL DAILY
Qty: 30 TABLET | Refills: 0 | Status: SHIPPED | OUTPATIENT
Start: 2023-04-06

## (undated) DEVICE — APPL CHLORAPREP W/TINT 26ML ORNG

## (undated) DEVICE — BITEBLOCK ENDO W/STRAP 60F A/ LF DISP

## (undated) DEVICE — SHEET,DRAPE,53X77,STERILE: Brand: MEDLINE

## (undated) DEVICE — GLV SURG TRIUMPH NATURAL W/ALOE PF LTX 7.5 STRL

## (undated) DEVICE — RESERVOIR,SUCTION,100CC,SILICONE: Brand: MEDLINE

## (undated) DEVICE — GOWN,NON-REINFORCED,4XL: Brand: MEDLINE

## (undated) DEVICE — STERILE POLYISOPRENE POWDER-FREE SURGICAL GLOVES WITH EMOLLIENT COATING: Brand: PROTEXIS

## (undated) DEVICE — PK MAJ PROC LF 60

## (undated) DEVICE — SUT VIC 3/0 SH 27IN J416H

## (undated) DEVICE — SINGLE-USE BIOPSY FORCEPS: Brand: RADIAL JAW 4

## (undated) DEVICE — GLV SURG SENSICARE GREEN W/ALOE PF LF 8.5 STRL

## (undated) DEVICE — DRN WND JP RND W TROC SIL 19F 1/4IN

## (undated) DEVICE — DRSNG SURESITE WNDW 4X4.5

## (undated) DEVICE — STCKNT STRL 6X60IN

## (undated) DEVICE — GOWN,PREVENTION PLUS,XLNG/XXLARGE,STRL: Brand: MEDLINE

## (undated) DEVICE — SKIN AFFIX SURG ADHESIVE 72/CS 0.55ML: Brand: MEDLINE

## (undated) DEVICE — ANTIBACTERIAL UNDYED BRAIDED (POLYGLACTIN 910), SYNTHETIC ABSORBABLE SUTURE: Brand: COATED VICRYL

## (undated) DEVICE — SOL IRR NACL 0.9PCT BT 1000ML

## (undated) DEVICE — SUT VIC 2/0 TIES 18IN J111T

## (undated) DEVICE — DRP WARMR MACH

## (undated) DEVICE — GLV SURG TRIUMPH LT PF LTX 8 STRL